# Patient Record
Sex: MALE | Race: BLACK OR AFRICAN AMERICAN | NOT HISPANIC OR LATINO | Employment: FULL TIME | URBAN - METROPOLITAN AREA
[De-identification: names, ages, dates, MRNs, and addresses within clinical notes are randomized per-mention and may not be internally consistent; named-entity substitution may affect disease eponyms.]

---

## 2020-12-09 ENCOUNTER — HOSPITAL ENCOUNTER (OUTPATIENT)
Dept: MEDSURG UNIT | Facility: HOSPITAL | Age: 30
End: 2020-12-11
Attending: INTERNAL MEDICINE | Admitting: INTERNAL MEDICINE

## 2020-12-09 LAB
ABS NEUT (OLG): 12.24 X10(3)/MCL (ref 2.1–9.2)
ALBUMIN SERPL-MCNC: 2.7 GM/DL (ref 3.5–5)
ALBUMIN/GLOB SERPL: 0.6 RATIO (ref 1.1–2)
ALP SERPL-CCNC: 144 UNIT/L (ref 40–150)
ALT SERPL-CCNC: 53 UNIT/L (ref 0–55)
AMYLASE SERPL-CCNC: 116 UNIT/L (ref 25–125)
APPEARANCE, UA: CLEAR
AST SERPL-CCNC: 51 UNIT/L (ref 5–34)
BACTERIA #/AREA URNS AUTO: ABNORMAL /HPF
BASOPHILS # BLD AUTO: 0 X10(3)/MCL (ref 0–0.2)
BASOPHILS NFR BLD AUTO: 0 %
BILIRUB SERPL-MCNC: 0.7 MG/DL
BILIRUB UR QL STRIP: NEGATIVE
BILIRUBIN DIRECT+TOT PNL SERPL-MCNC: 0.3 MG/DL (ref 0–0.8)
BILIRUBIN DIRECT+TOT PNL SERPL-MCNC: 0.4 MG/DL (ref 0–0.5)
BUN SERPL-MCNC: 5 MG/DL (ref 8.9–20.6)
C DIFF INTERP: NORMAL
CALCIUM SERPL-MCNC: 8.9 MG/DL (ref 8.4–10.2)
CHLORIDE SERPL-SCNC: 104 MMOL/L (ref 98–107)
CHOLEST SERPL-MCNC: 75 MG/DL
CHOLEST/HDLC SERPL: 5 {RATIO} (ref 0–5)
CO2 SERPL-SCNC: 25 MMOL/L (ref 22–29)
COLOR STL: NORMAL
COLOR UR: ABNORMAL
CONSISTENCY STL: NORMAL
CREAT SERPL-MCNC: 0.66 MG/DL (ref 0.73–1.18)
CRP SERPL-MCNC: 15.11 MG/DL
DEPRECATED CALCIDIOL+CALCIFEROL SERPL-MC: 16.9 NG/ML (ref 30–80)
EOSINOPHIL # BLD AUTO: 0.3 X10(3)/MCL (ref 0–0.9)
EOSINOPHIL NFR BLD AUTO: 2 %
ERYTHROCYTE [DISTWIDTH] IN BLOOD BY AUTOMATED COUNT: 15.2 % (ref 11.5–14.5)
ERYTHROCYTE [SEDIMENTATION RATE] IN BLOOD: 39 MM/HR (ref 0–15)
FERRITIN SERPL-MCNC: 759.73 NG/ML (ref 21.81–274.66)
FOLATE SERPL-MCNC: 4.2 NG/ML (ref 7–31.4)
GLOBULIN SER-MCNC: 4.2 GM/DL (ref 2.4–3.5)
GLUCOSE (UA): NEGATIVE
GLUCOSE SERPL-MCNC: 113 MG/DL (ref 74–100)
HCT VFR BLD AUTO: 35.4 % (ref 40–51)
HDLC SERPL-MCNC: 16 MG/DL (ref 35–60)
HEMOCCULT SP1 STL QL: NEGATIVE
HGB BLD-MCNC: 11.7 GM/DL (ref 13.5–17.5)
HGB UR QL STRIP: NEGATIVE
HIV 1+2 AB+HIV1 P24 AG SERPL QL IA: NONREACTIVE
HYALINE CASTS #/AREA URNS LPF: ABNORMAL /LPF
IMM GRANULOCYTES # BLD AUTO: 0.39 10*3/UL
IMM GRANULOCYTES NFR BLD AUTO: 2 %
KETONES UR QL STRIP: NEGATIVE
LACTATE SERPL-SCNC: 1.1 MMOL/L (ref 0.5–2.2)
LDLC SERPL CALC-MCNC: 44 MG/DL (ref 50–140)
LEUKOCYTE ESTERASE UR QL STRIP: NEGATIVE
LIPASE SERPL-CCNC: 350 U/L
LYMPHOCYTES # BLD AUTO: 1.9 X10(3)/MCL (ref 0.6–4.6)
LYMPHOCYTES NFR BLD AUTO: 12 %
MAGNESIUM SERPL-MCNC: 1.75 MG/DL (ref 1.6–2.6)
MCH RBC QN AUTO: 31.4 PG (ref 26–34)
MCHC RBC AUTO-ENTMCNC: 33.1 GM/DL (ref 31–37)
MCV RBC AUTO: 94.9 FL (ref 80–100)
MONOCYTES # BLD AUTO: 0.9 X10(3)/MCL (ref 0.1–1.3)
MONOCYTES NFR BLD AUTO: 6 %
NEUTROPHILS # BLD AUTO: 12.24 X10(3)/MCL (ref 2.1–9.2)
NEUTROPHILS NFR BLD AUTO: 78 %
NITRITE UR QL STRIP: NEGATIVE
PH UR STRIP: 6 [PH] (ref 4.5–8)
PHOSPHATE SERPL-MCNC: 3.7 MG/DL (ref 2.3–4.7)
PLATELET # BLD AUTO: 319 X10(3)/MCL (ref 130–400)
PMV BLD AUTO: 10.1 FL (ref 7.4–10.4)
POTASSIUM SERPL-SCNC: 3.9 MMOL/L (ref 3.5–5.1)
PROT SERPL-MCNC: 6.9 GM/DL (ref 6.4–8.3)
PROT UR QL STRIP: NEGATIVE
RBC # BLD AUTO: 3.73 X10(6)/MCL (ref 4.5–5.9)
RBC #/AREA URNS AUTO: ABNORMAL /HPF
SARS-COV-2 AG RESP QL IA.RAPID: NEGATIVE
SODIUM SERPL-SCNC: 138 MMOL/L (ref 136–145)
SP GR UR STRIP: 1 (ref 1–1.03)
SQUAMOUS #/AREA URNS LPF: ABNORMAL /LPF
TRIGL SERPL-MCNC: 76 MG/DL (ref 34–140)
TSH SERPL-ACNC: 0.79 UIU/ML (ref 0.35–4.94)
UROBILINOGEN UR STRIP-ACNC: NORMAL
VALPROATE SERPL-MCNC: 39.4 UG/ML (ref 50–100)
VIT B12 SERPL-MCNC: 1185 PG/ML (ref 213–816)
VLDLC SERPL CALC-MCNC: 15 MG/DL
WBC # SPEC AUTO: 15.8 X10(3)/MCL (ref 4.5–11)
WBC #/AREA URNS AUTO: ABNORMAL /HPF

## 2020-12-10 LAB
ABS NEUT (OLG): 10.23 X10(3)/MCL (ref 2.1–9.2)
ALBUMIN SERPL-MCNC: 2.3 GM/DL (ref 3.5–5)
ALBUMIN/GLOB SERPL: 0.6 RATIO (ref 1.1–2)
ALP SERPL-CCNC: 117 UNIT/L (ref 40–150)
ALT SERPL-CCNC: 37 UNIT/L (ref 0–55)
AMPHET UR QL SCN: NEGATIVE
AST SERPL-CCNC: 36 UNIT/L (ref 5–34)
BARBITURATE SCN PRESENT UR: NEGATIVE
BASOPHILS # BLD AUTO: 0 X10(3)/MCL (ref 0–0.2)
BASOPHILS NFR BLD AUTO: 0 %
BENZODIAZ UR QL SCN: NEGATIVE
BILIRUB SERPL-MCNC: 0.8 MG/DL
BILIRUBIN DIRECT+TOT PNL SERPL-MCNC: 0.4 MG/DL (ref 0–0.5)
BILIRUBIN DIRECT+TOT PNL SERPL-MCNC: 0.4 MG/DL (ref 0–0.8)
BUN SERPL-MCNC: 5 MG/DL (ref 8.9–20.6)
CALCIUM SERPL-MCNC: 8.7 MG/DL (ref 8.4–10.2)
CANNABINOIDS UR QL SCN: NEGATIVE
CHLORIDE SERPL-SCNC: 103 MMOL/L (ref 98–107)
CO2 SERPL-SCNC: 27 MMOL/L (ref 22–29)
COCAINE UR QL SCN: NEGATIVE
CREAT SERPL-MCNC: 0.68 MG/DL (ref 0.73–1.18)
EOSINOPHIL # BLD AUTO: 0.4 X10(3)/MCL (ref 0–0.9)
EOSINOPHIL NFR BLD AUTO: 3 %
ERYTHROCYTE [DISTWIDTH] IN BLOOD BY AUTOMATED COUNT: 15.3 % (ref 11.5–14.5)
GLOBULIN SER-MCNC: 3.7 GM/DL (ref 2.4–3.5)
GLUCOSE SERPL-MCNC: 73 MG/DL (ref 74–100)
HCT VFR BLD AUTO: 30.7 % (ref 40–51)
HGB BLD-MCNC: 10 GM/DL (ref 13.5–17.5)
IMM GRANULOCYTES # BLD AUTO: 0.21 10*3/UL
IMM GRANULOCYTES NFR BLD AUTO: 2 %
IRON SATN MFR SERPL: 9 % (ref 20–50)
IRON SERPL-MCNC: 15 UG/DL (ref 65–175)
LYMPHOCYTES # BLD AUTO: 1.8 X10(3)/MCL (ref 0.6–4.6)
LYMPHOCYTES NFR BLD AUTO: 14 %
MAGNESIUM SERPL-MCNC: 1.79 MG/DL (ref 1.6–2.6)
MCH RBC QN AUTO: 31.5 PG (ref 26–34)
MCHC RBC AUTO-ENTMCNC: 32.6 GM/DL (ref 31–37)
MCV RBC AUTO: 96.8 FL (ref 80–100)
MONOCYTES # BLD AUTO: 1 X10(3)/MCL (ref 0.1–1.3)
MONOCYTES NFR BLD AUTO: 7 %
NEUTROPHILS # BLD AUTO: 10.23 X10(3)/MCL (ref 2.1–9.2)
NEUTROPHILS NFR BLD AUTO: 75 %
OPIATES UR QL SCN: POSITIVE
PCP UR QL: NEGATIVE
PH UR STRIP.AUTO: 6.5 [PH] (ref 3–11)
PHOSPHATE SERPL-MCNC: 4.1 MG/DL (ref 2.3–4.7)
PLATELET # BLD AUTO: 247 X10(3)/MCL (ref 130–400)
PMV BLD AUTO: 10.3 FL (ref 7.4–10.4)
POTASSIUM SERPL-SCNC: 4.1 MMOL/L (ref 3.5–5.1)
PROT SERPL-MCNC: 6 GM/DL (ref 6.4–8.3)
RBC # BLD AUTO: 3.17 X10(6)/MCL (ref 4.5–5.9)
SODIUM SERPL-SCNC: 135 MMOL/L (ref 136–145)
TIBC SERPL-MCNC: 155 UG/DL (ref 69–240)
TIBC SERPL-MCNC: 170 UG/DL (ref 250–450)
TRANSFERRIN SERPL-MCNC: 152 MG/DL (ref 174–364)
WBC # SPEC AUTO: 13.7 X10(3)/MCL (ref 4.5–11)

## 2020-12-11 LAB
ABS NEUT (OLG): 6.11 X10(3)/MCL (ref 2.1–9.2)
BASOPHILS # BLD AUTO: 0 X10(3)/MCL (ref 0–0.2)
BASOPHILS NFR BLD AUTO: 0 %
COLOR STL: NORMAL
CONSISTENCY STL: NORMAL
EOSINOPHIL # BLD AUTO: 0.6 X10(3)/MCL (ref 0–0.9)
EOSINOPHIL NFR BLD AUTO: 6 %
ERYTHROCYTE [DISTWIDTH] IN BLOOD BY AUTOMATED COUNT: 14.8 % (ref 11.5–14.5)
HCT VFR BLD AUTO: 29.7 % (ref 40–51)
HEMOCCULT SP1 STL QL: NEGATIVE
HGB BLD-MCNC: 9.7 GM/DL (ref 13.5–17.5)
IMM GRANULOCYTES # BLD AUTO: 0.16 10*3/UL
IMM GRANULOCYTES NFR BLD AUTO: 2 %
LIPASE SERPL-CCNC: 215 U/L
LYMPHOCYTES # BLD AUTO: 1.4 X10(3)/MCL (ref 0.6–4.6)
LYMPHOCYTES NFR BLD AUTO: 16 %
MAGNESIUM SERPL-MCNC: 1.74 MG/DL (ref 1.6–2.6)
MCH RBC QN AUTO: 31.9 PG (ref 26–34)
MCHC RBC AUTO-ENTMCNC: 32.7 GM/DL (ref 31–37)
MCV RBC AUTO: 97.7 FL (ref 80–100)
MONOCYTES # BLD AUTO: 0.7 X10(3)/MCL (ref 0.1–1.3)
MONOCYTES NFR BLD AUTO: 8 %
NEUTROPHILS # BLD AUTO: 6.11 X10(3)/MCL (ref 2.1–9.2)
NEUTROPHILS NFR BLD AUTO: 68 %
PHOSPHATE SERPL-MCNC: 3.8 MG/DL (ref 2.3–4.7)
PLATELET # BLD AUTO: 239 X10(3)/MCL (ref 130–400)
PMV BLD AUTO: 10.7 FL (ref 7.4–10.4)
RBC # BLD AUTO: 3.04 X10(6)/MCL (ref 4.5–5.9)
WBC # SPEC AUTO: 8.9 X10(3)/MCL (ref 4.5–11)

## 2020-12-14 LAB
FINAL CULTURE: NORMAL
FINAL CULTURE: NORMAL

## 2021-03-09 ENCOUNTER — HISTORICAL (OUTPATIENT)
Dept: ADMINISTRATIVE | Facility: HOSPITAL | Age: 31
End: 2021-03-09

## 2021-03-09 LAB
ABS NEUT (OLG): 2.02 X10(3)/MCL (ref 2.1–9.2)
ALBUMIN SERPL-MCNC: 4 GM/DL (ref 3.5–5)
ALBUMIN/GLOB SERPL: 1.1 RATIO (ref 1.1–2)
ALP SERPL-CCNC: 84 UNIT/L (ref 40–150)
ALT SERPL-CCNC: 23 UNIT/L (ref 0–55)
AST SERPL-CCNC: 24 UNIT/L (ref 5–34)
BASOPHILS # BLD AUTO: 0 X10(3)/MCL (ref 0–0.2)
BASOPHILS NFR BLD AUTO: 0 %
BILIRUB SERPL-MCNC: 0.7 MG/DL
BILIRUBIN DIRECT+TOT PNL SERPL-MCNC: 0.3 MG/DL (ref 0–0.5)
BILIRUBIN DIRECT+TOT PNL SERPL-MCNC: 0.4 MG/DL (ref 0–0.8)
BUN SERPL-MCNC: 6.8 MG/DL (ref 8.9–20.6)
CALCIUM SERPL-MCNC: 9.3 MG/DL (ref 8.4–10.2)
CHLORIDE SERPL-SCNC: 105 MMOL/L (ref 98–107)
CO2 SERPL-SCNC: 29 MMOL/L (ref 22–29)
CREAT SERPL-MCNC: 0.79 MG/DL (ref 0.73–1.18)
EOSINOPHIL # BLD AUTO: 0.1 X10(3)/MCL (ref 0–0.9)
EOSINOPHIL NFR BLD AUTO: 2 %
ERYTHROCYTE [DISTWIDTH] IN BLOOD BY AUTOMATED COUNT: 13.7 % (ref 11.5–14.5)
GLOBULIN SER-MCNC: 3.6 GM/DL (ref 2.4–3.5)
GLUCOSE SERPL-MCNC: 80 MG/DL (ref 74–100)
HCT VFR BLD AUTO: 41 % (ref 40–51)
HGB BLD-MCNC: 13.3 GM/DL (ref 13.5–17.5)
IMM GRANULOCYTES # BLD AUTO: 0.01 10*3/UL
IMM GRANULOCYTES NFR BLD AUTO: 0 %
LYMPHOCYTES # BLD AUTO: 2.1 X10(3)/MCL (ref 0.6–4.6)
LYMPHOCYTES NFR BLD AUTO: 46 %
MCH RBC QN AUTO: 29.6 PG (ref 26–34)
MCHC RBC AUTO-ENTMCNC: 32.4 GM/DL (ref 31–37)
MCV RBC AUTO: 91.1 FL (ref 80–100)
MONOCYTES # BLD AUTO: 0.3 X10(3)/MCL (ref 0.1–1.3)
MONOCYTES NFR BLD AUTO: 6 %
NEUTROPHILS # BLD AUTO: 2.02 X10(3)/MCL (ref 2.1–9.2)
NEUTROPHILS NFR BLD AUTO: 45 %
PLATELET # BLD AUTO: 211 X10(3)/MCL (ref 130–400)
PMV BLD AUTO: 11.2 FL (ref 7.4–10.4)
POTASSIUM SERPL-SCNC: 4 MMOL/L (ref 3.5–5.1)
PROT SERPL-MCNC: 7.6 GM/DL (ref 6.4–8.3)
RBC # BLD AUTO: 4.5 X10(6)/MCL (ref 4.5–5.9)
SODIUM SERPL-SCNC: 140 MMOL/L (ref 136–145)
TRIGL SERPL-MCNC: 115 MG/DL (ref 34–140)
WBC # SPEC AUTO: 4.5 X10(3)/MCL (ref 4.5–11)

## 2021-08-07 ENCOUNTER — HOSPITAL ENCOUNTER (EMERGENCY)
Facility: HOSPITAL | Age: 31
Discharge: HOME | End: 2021-08-07
Attending: EMERGENCY MEDICINE
Payer: MEDICARE

## 2021-08-07 VITALS
TEMPERATURE: 98 F | BODY MASS INDEX: 29.03 KG/M2 | RESPIRATION RATE: 18 BRPM | SYSTOLIC BLOOD PRESSURE: 120 MMHG | OXYGEN SATURATION: 97 % | DIASTOLIC BLOOD PRESSURE: 80 MMHG | HEIGHT: 67 IN | WEIGHT: 185 LBS | HEART RATE: 105 BPM

## 2021-08-07 DIAGNOSIS — S39.012A BACK STRAIN, INITIAL ENCOUNTER: Primary | ICD-10-CM

## 2021-08-07 PROCEDURE — 25000003 PHARM REV CODE 250: Performed by: PHYSICIAN ASSISTANT

## 2021-08-07 PROCEDURE — 99283 EMERGENCY DEPT VISIT LOW MDM: CPT

## 2021-08-07 RX ORDER — METHOCARBAMOL 500 MG/1
500 TABLET, FILM COATED ORAL
Status: COMPLETED | OUTPATIENT
Start: 2021-08-07 | End: 2021-08-07

## 2021-08-07 RX ORDER — ACETAMINOPHEN 325 MG/1
650 TABLET ORAL
Status: COMPLETED | OUTPATIENT
Start: 2021-08-07 | End: 2021-08-07

## 2021-08-07 RX ADMIN — ACETAMINOPHEN 650 MG: 325 TABLET ORAL at 03:08

## 2021-08-07 RX ADMIN — METHOCARBAMOL 500 MG: 500 TABLET ORAL at 03:08

## 2021-08-12 LAB
ABS NEUT (OLG): 2.35 X10(3)/MCL (ref 2.1–9.2)
ALBUMIN SERPL-MCNC: 4.2 GM/DL (ref 3.5–5)
ALBUMIN/GLOB SERPL: 1.4 RATIO (ref 1.1–2)
ALP SERPL-CCNC: 88 UNIT/L (ref 40–150)
ALT SERPL-CCNC: 41 UNIT/L (ref 0–55)
AST SERPL-CCNC: 36 UNIT/L (ref 5–34)
BASOPHILS # BLD AUTO: 0 X10(3)/MCL (ref 0–0.2)
BASOPHILS NFR BLD AUTO: 0 %
BILIRUB SERPL-MCNC: 0.5 MG/DL
BILIRUBIN DIRECT+TOT PNL SERPL-MCNC: 0.2 MG/DL (ref 0–0.5)
BILIRUBIN DIRECT+TOT PNL SERPL-MCNC: 0.3 MG/DL (ref 0–0.8)
BUN SERPL-MCNC: 5.1 MG/DL (ref 8.9–20.6)
CALCIUM SERPL-MCNC: 9.7 MG/DL (ref 8.4–10.2)
CHLORIDE SERPL-SCNC: 103 MMOL/L (ref 98–107)
CO2 SERPL-SCNC: 27 MMOL/L (ref 22–29)
CREAT SERPL-MCNC: 0.78 MG/DL (ref 0.73–1.18)
EOSINOPHIL # BLD AUTO: 0.1 X10(3)/MCL (ref 0–0.9)
EOSINOPHIL NFR BLD AUTO: 3 %
ERYTHROCYTE [DISTWIDTH] IN BLOOD BY AUTOMATED COUNT: 18 % (ref 11.5–17)
GLOBULIN SER-MCNC: 3.1 GM/DL (ref 2.4–3.5)
GLUCOSE SERPL-MCNC: 88 MG/DL (ref 74–100)
HCT VFR BLD AUTO: 36.2 % (ref 42–52)
HGB BLD-MCNC: 12.5 GM/DL (ref 14–18)
INR PPP: 1 (ref 0–1.3)
LYMPHOCYTES # BLD AUTO: 1.6 X10(3)/MCL (ref 0.6–4.6)
LYMPHOCYTES NFR BLD AUTO: 38 %
MCH RBC QN AUTO: 34.5 PG (ref 27–31)
MCHC RBC AUTO-ENTMCNC: 34.5 GM/DL (ref 33–36)
MCV RBC AUTO: 100 FL (ref 80–94)
MONOCYTES # BLD AUTO: 0.2 X10(3)/MCL (ref 0.1–1.3)
MONOCYTES NFR BLD AUTO: 6 %
NEUTROPHILS # BLD AUTO: 2.35 X10(3)/MCL (ref 2.1–9.2)
NEUTROPHILS NFR BLD AUTO: 54 %
PLATELET # BLD AUTO: 253 X10(3)/MCL (ref 130–400)
PMV BLD AUTO: 10.8 FL (ref 9.4–12.4)
POTASSIUM SERPL-SCNC: 3.7 MMOL/L (ref 3.5–5.1)
PROT SERPL-MCNC: 7.3 GM/DL (ref 6.4–8.3)
PROTHROMBIN TIME: 12.8 SECOND(S) (ref 12.5–14.5)
RBC # BLD AUTO: 3.62 X10(6)/MCL (ref 4.7–6.1)
SARS-COV-2 AG RESP QL IA.RAPID: NEGATIVE
SODIUM SERPL-SCNC: 140 MMOL/L (ref 136–145)
WBC # SPEC AUTO: 4.4 X10(3)/MCL (ref 4.5–11.5)

## 2021-08-13 ENCOUNTER — HISTORICAL (OUTPATIENT)
Dept: ADMINISTRATIVE | Facility: HOSPITAL | Age: 31
End: 2021-08-13

## 2021-08-13 LAB — EST CREAT CLEARANCE SER (OHS): 135.51 ML/MIN

## 2022-02-01 ENCOUNTER — HISTORICAL (OUTPATIENT)
Dept: ADMINISTRATIVE | Facility: HOSPITAL | Age: 32
End: 2022-02-01

## 2022-02-08 ENCOUNTER — HISTORICAL (OUTPATIENT)
Dept: ADMINISTRATIVE | Facility: HOSPITAL | Age: 32
End: 2022-02-08

## 2022-04-10 ENCOUNTER — HISTORICAL (OUTPATIENT)
Dept: ADMINISTRATIVE | Facility: HOSPITAL | Age: 32
End: 2022-04-10
Payer: COMMERCIAL

## 2022-04-26 VITALS
BODY MASS INDEX: 29.46 KG/M2 | HEIGHT: 69 IN | DIASTOLIC BLOOD PRESSURE: 84 MMHG | WEIGHT: 198.88 LBS | SYSTOLIC BLOOD PRESSURE: 119 MMHG
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
## 2022-05-02 NOTE — HISTORICAL [facility] CERNER
This is a historical note converted from Angela. Formatting and pictures may have been removed.  Please reference Angela for original formatting and attached multimedia. Chief Complaint  Pt to ed c/o generalized abdominal pain x 10 days. Also reports diarrhea.  History of Present Illness  Patient is hard to hear?so used??for history taking. ?A 30-year-old?-American?male?who presented to ED with chief complaint of?worsening crampy 10 on 10?episodic?abdominal pain?6-7 times per day?for past 10 days?denies any radiation of pain?but does endorse that it improves with?resting?and worse with movement.? Patient reports having?watery dark stools?6-7 episodes per day.? He also having?dry cough,?denies any?fevers,?aches, night sweats, exposure to sick contacts. ?Denies any chest pain, S OB,?vomiting,?dysuria.? Denies any changes in?weight and appetite.? Patient reports having good appetite but however?unable to eat much?due to his abdominal pain.? Patient also reports having?blood streaks in the stool?but denies any melena, massive blood?in stool,?denies any hemoptysis, hematemesis.  Of note?patient was?recently admitted?at [facility]?for?similar complaints,?which was presumed to be pancreatitis secondary to valproic acid.GI followed him throughout the stay?and?was?worked up?for belly pain with further??EGD?with?biopsy?remarkable for? acute duodenitis,?upper GI x-ray?series?within normal limits,?abdominal ultrasound?was within normal limits with no gallstones?or CBD dilatation, however CT abdomen?with?minimal?fluid surrounding?pancreas.??On discharge from the hospital?patient was?discontinued from valproic acid?and started on?Keppra 500?mg twice daily.? But however?patient is not sure?what seizure medications he is on at home.  Past medical history:?Hearing difficulty?since childhood, has cochlear implant,?depression, SI affective disorder,?epilepsy.  Past surgical history:?Possible  appendicectomy?per grandfather.  Social history:?Denies any active smoking, alcohol abuse,?illicit drug usage  Review of Systems  Constitutional:?no fever, fatigue, weakness  Respiratory:?no cough, no wheezing, no shortness of breath  Cardiovascular:?no chest pain, no palpitations, no edema  Gastrointestinal:?no nausea, vomiting, + diarrhea.?+ abdominal pain  Genitourinary:?no dysuria, no urinary frequency or urgency, no hematuria  Hema/Lymph:?no abnormal bruising or bleeding  Endocrine:?no heat or cold intolerance, no excessive thirst or excessive urination  Musculoskeletal:?no muscle or joint pain, no joint swelling  Integumentary:?no skin rash or abnormal lesion  Neurologic: no headache, no dizziness, no weakness or numbness  ?  Physical Exam  Vitals & Measurements  T:?36.9? ?C (Temporal Artery)? HR:?93(Peripheral)? RR:?16? BP:?116/70? SpO2:?98%? SpO2:?98%? WT:?87.35?kg?  Gen: well-nourished, well-developed? male in no acute distress  HEENT: Normocephalic, atraumatic.  Neck: No JVD or carotid bruits. No thyromegaly. No lymphadenopathy.  Heart: RRR, no murmurs, gallops, clicks or rubs.  Lungs: CTAB without rales, wheezes or rhonchi. Normal work of breathing. Chest rise symmetrical on inspiration.  Abd: Soft, diffuse abdominal tenderness noted, mildly distended and without guarding?and rigidity. No organomegaly. No obvious masses. Bowel sounds present.  Extremities: Radial and pedal pulses 2+ bilaterally, no LE edema.  MSK: No obvious deformities. Moves all extremities purposefully.  Neuro: Responds well to commands.  Skin: Warm, dry and without rashes.  ?  Assessment/Plan  Acute??pancreatitis with peripancreatic fluid collection.  -Lipase on arrival is?350,?CT abdomen concerning for?pancreatitis with pancreatic inflammation,?5 cm?confluent area of fluid?along greatest curvature of stomach?suggestive of developing?peripancreatic fluid collection.  -Pancreatitis?likely secondary to?valproic  acid.  -To obtain valproic acid levels.  -On IV hydration?at 250 cc/h, to advance diet?as tolerated.  -On morphine 4 mg?every 4?as needed for?abdominal pain,?Zofran 8 mg?every 4 as needed for nausea.  ?  Chronic abdominal pain associated with diarrhea.  -Patient reports?having?crampy abdominal pain with watery diarrhea,?to get?fecal lactoferrin, calprotectin?to rule out IBD, also to get?C. difficile?stool assay,?HIV .  ?  Possible atypical pneumonia?versus aspiration pneumonia.  -During the previous admission?3 to 4 days?pror?patient was in respiratory distress and there was a concern for?aspiration pneumonia?improved with?antibiotics.  -?Patient continues to report having cough?however nonproductive,?has white count of?15.8?and was?tachycardic?on current admission.  -Chest x-ray today concerning for?bilateral?basilar interstitial opacities.  -To continue Zosyn empirically for now.  -Blood cultures were ordered in ED, to follow-up.  ?  History of epilepsy.  -Patient reports that his last seizure was 4 days ago.  -Plan to get valproic acid levels.??  -To continue Keppra 500 mg twice daily?for seizure prophylaxis.  ?  Normocytic normochromic anemia.  -H&H?is 11.7/35.4, no active signs of bleeding.  -To get?ferritin,?B12 levels,?folate levels,?FOBT?to follow-up.  DVT PPx:?Lovenox 40 daily  GI PPx:?Protonix 40 daily  Antibiotics:?Zosyn day 1  IVF:?NS at 200 cc/h  O2:?RA  Code status:?Full code  PCP:???  Dispo:?Patient admitted for acute pancreatitis?with?peripancreatic fluid collection, n.p.o. for now?advance diet as tolerated?patient continues to worsen?consider repeating?CT abdomen?as needed.  ?  ?  ?  ?  ?   Lab Results  Labs Last 24 Hours?  ?Chemistry? Hematology/Coagulation?   Sodium Lvl: 138 mmol/L (12/09/20 10:51:00) WBC:?15.8 x10(3)/mcL?High (12/09/20 10:51:00)   Potassium Lvl: 3.9 mmol/L (12/09/20 10:51:00) RBC:?3.73 x10(6)/mcL?Low (12/09/20 10:51:00)   Chloride: 104 mmol/L (12/09/20 10:51:00) Hgb:?11.7  gm/dL?Low (12/09/20 10:51:00)   CO2: 25 mmol/L (12/09/20 10:51:00) Hct:?35.4 %?Low (12/09/20 10:51:00)   Calcium Lvl: 8.9 mg/dL (12/09/20 10:51:00) Platelet: 319 x10(3)/mcL (12/09/20 10:51:00)   Magnesium Lvl: 1.75 mg/dL (12/09/20 10:51:00) MCV: 94.9 fL (12/09/20 10:51:00)   Glucose Lvl:?113 mg/dL?High (12/09/20 10:51:00) MCH: 31.4 pg (12/09/20 10:51:00)   BUN:?5 mg/dL?Low (12/09/20 10:51:00) MCHC: 33.1 gm/dL (12/09/20 10:51:00)   Creatinine:?0.66 mg/dL?Low (12/09/20 10:51:00) RDW:?15.2 %?High (12/09/20 10:51:00)   Est Creat Clearance Ser: 161.63 mL/min (12/09/20 12:10:00) MPV: 10.1 fL (12/09/20 10:51:00)   eGFR-AA: >105 (12/09/20 10:51:00) Abs Neut:?12.24 x10(3)/mcL?High (12/09/20 10:51:00)   eGFR-LEANDRA: >105 (12/09/20 10:51:00) Neutro Auto: 78 % (12/09/20 10:51:00)   Amylase Lvl: 116 unit/L (12/09/20 10:51:00) Lymph Auto: 12 % (12/09/20 10:51:00)   Bili Total: 0.7 mg/dL (12/09/20 10:51:00) Mono Auto: 6 % (12/09/20 10:51:00)   Bili Direct: 0.4 mg/dL (12/09/20 10:51:00) Eos Auto: 2 % (12/09/20 10:51:00)   Bili Indirect: 0.3 mg/dL (12/09/20 10:51:00) Abs Eos: 0.3 x10(3)/mcL (12/09/20 10:51:00)   AST:?51 unit/L?High (12/09/20 10:51:00) Basophil Auto: 0 % (12/09/20 10:51:00)   ALT: 53 unit/L (12/09/20 10:51:00) Abs Neutro:?12.24 x10(3)/mcL?High (12/09/20 10:51:00)   Alk Phos: 144 unit/L (12/09/20 10:51:00) Abs Lymph: 1.9 x10(3)/mcL (12/09/20 10:51:00)   Total Protein: 6.9 gm/dL (12/09/20 10:51:00) Abs Mono: 0.9 x10(3)/mcL (12/09/20 10:51:00)   Albumin Lvl:?2.7 gm/dL?Low (12/09/20 10:51:00) Abs Baso: 0 x10(3)/mcL (12/09/20 10:51:00)   Globulin:?4.2 gm/dL?High (12/09/20 10:51:00) IG%: 2 % (12/09/20 10:51:00)   A/G Ratio:?0.6 ratio?Low (12/09/20 10:51:00) IG#: 0.39 (12/09/20 10:51:00)   Phosphorus: 3.7 mg/dL (12/09/20 10:51:00)    Ferritin Lvl:?759.73 ng/mL?High (12/09/20 10:51:00)    Lactic Acid Lvl: 1.1 mmol/L (12/09/20 14:09:00)    Lipase Lvl:?350 U/L?High (12/09/20 10:51:00)    Chol: 75 mg/dL (12/09/20 10:51:00)     HDL:?16 mg/dL?Low (20 10:51:00)    Tri mg/dL (20 10:51:00)    LDL:?44 mg/dL?Low (20 10:51:00)    Chol/HDL: 5 (20 10:51:00)    VLDL: 15 (20 10:51:00)    TSH: 0.787 uIU/mL (20 10:51:00)    Diagnostic Results  Accession:?[id number]  Order:?XR Chest 2 Views  Report Dt/Tm:?2020 16:08  Report:?  XR Chest 2 Views  ?  INDICATION: Cough  ?  COMPARISON: 2020  ?  FINDINGS: As below.  ?  IMPRESSION:  ?  LINES/TUBES: None.  ?  Bibasilar airspace opacities persist, likely infectious/inflammatory,  not significantly changed.  ?  Small bilateral pleural effusions are also grossly unchanged.  ?  Heart size stable.  No pneumothoraces.  ?  Accession:?[id number]  Order:?CT Abdomen and Pelvis W Contrast  Report Dt/Tm:?2020 13:39  Report:?  CT Abdomen and Pelvis W Contrast  ?  REASON FOR STUDY: Abdominal Pain?  ?  TECHNIQUE: CT imaging was performed of the abdomen and pelvis after  the administration of intravenous contrast. Dose length product is  1052 mGycm. Automatic exposure control, adjustment of mA/kV or  iterative reconstruction technique was used to limit radiation dose.  ?  COMPARISON: CT 2020?  ?  FINDINGS:?  ?  Liver/biliary: Normal liver. ?No calcified gallstones. No intra or  extrahepatic biliary ductal dilation. ?  ?  Pancreas: Increased peripancreatic stranding since prior CT with small  volume adjacent fluid. No defined areas of pancreatic necrosis.  ?  Spleen: At upper limits of normal in size.  ?  Adrenals: Normal.  ?  Genitourinary: Symmetric renal enhancement. No hydronephrosis. The  bladder is within normal limits. Normal prostate size.  ?  Stomach/bowel: No evidence of bowel obstruction. Appendix not seen  with suspected prior appendectomy.  ?  Lymph nodes: No pathologically enlarged lymph node identified.  ?  Peritoneum: Confluent area of fluid abutting the greater curvature of  the stomach measuring up to 50 mm with some thin areas of  peripheral  enhancement.  ?  Vasculature: Normal abdominal aortic caliber. The SMV, splenic vein  and main portal vein are patent.?  ?  Abdominal wall: Tiny fat-containing umbilical hernia. Similar nodular  areas of subcutaneous soft tissue over the right buttock, possibly  injection sites.  ?  Lung bases: Small bilateral pleural effusions with areas of mild  adjacent atelectasis.  ?  Musculoskeletal: No acute osseous findings.  ?  IMPRESSION: Pancreatitis with increased pancreatic inflammation since  11/29/2020. 50 mm confluent area of fluid along the greater curvature  of the stomach is suggestive of developing acute peripancreatic fluid  collection.  ?      Pt seen and Examined with HO1,  Agree with A/P  ?  30-year-old hard of hearing male recently discharged from [facility] for acute pancreatitis attributed to Depakote returns after 3 days with continuing ongoing complaints of abdominal pain nausea cannot tolerate by mouth as well as diarrhea.  Vital signs fairly benign tachycardia resolving after IV fluids given?in the ER  Physical exam showing diffuse tenderness with presence of bowel sounds no organomegaly, guarding  Lab abnormalities with slight hypokalemia hypomagnesemia hypoalbuminemia, elevated ferritin, low folate, low vitamin D, elevated white count of 15.8 and a low valproic acid level, negative COVID  CT scan done in the ER shows pancreatitis with increased inflammation compared to the previous CT scan done at [facility] ER admission with 5 cm of fluid along the greater curvature stomach suggestive of developing acute peripancreatic fluid collection  ?  Admitting patient for acute pancreatitis, considering CT findings as well as elevated lipase the cause of which remains unclear considering Depakote levels are low/normal, after speaking with the patient as well as grandfather unclear if they were taken to Depakote upon discharge, looks like discharge summary indicates that they were instructed to?stop taking  Depakote was instructed to the [facility] for seizures remaining of [place] unimpressive for etiology of drug-induced pancreatitis. considering ongoing symptoms of diarrhea also ordered C. diff fecal lactoferrin,?calprotectin, ESR, CRP, UDS, IGg4  Repeat chest x-ray still shows the bilateral small pulmonary effusions but no obvious consolidation  Continue Zoysn. BC pending, Stool Ova and parasite pending  ?   I was present with the resident during the history and physical examination.  ???  [x ] I discussed the case with the resident and agree with the findings and plan as documented in the residents note.  [ ] I discussed the case with the resident and agree with the findings and plan as documented in the residents note except:  History as above  Abd- soft non tender  Will advance diet as tolerated

## 2022-05-02 NOTE — HISTORICAL [facility]ER
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  The gallbladder and bile ducts, and upper abdominal solid organs, and  urologic tracts are without evidence of acute or new focal  abnormality. The  The urinary bladder and prostate are unremarkable for CT appearance.  ?  There are no findings to suggest high-grade gastric outlet or small  bowel obstruction. Interval development of mild disorganized fluid and  fat stranding is appreciated adjacent to the duodenum. There is no  associated focal mural irregularity, convincing intraluminal  abnormality, or regional organized collection.  The appendix is not clearly identified, there are no secondary  findings to suggest acute appendicitis. Moderate volume residual fecal  material is present through the colon and may reflect element of  constipation. Otherwise, the large bowel is unremarkable.  No free intra-abdominal air is identified.  ?  There is no pathologic lymph node enlargement or development of  necrotic adenopathy.  ?  The body wall subcutaneous tissues, regional musculature, and  visualized osseous structures demonstrate no acute or new focal  abnormality. Small uncomplicated fat-containing  umbilical hernia is  similar in the interval.  ?  IMPRESSION  1. ?Interval development of disorganized fluid adjacent to the  proximal duodenum is nonspecific, may reflect element of duodenitis,  early pancreatitis, or possible sequela of duodenal ulcer.  2. ?Otherwise, no findings to suggest acute abdominopelvic process or  other significant interval change. [2] (12/09/2020 13:37 CST CT Abdomen and Pelvis W Contrast)  ?  Reason For Exam  diffuse abd pain;Abdominal Pain  ?  Radiology Report  CT Abdomen and Pelvis W Contrast  ?  REASON FOR STUDY: Abdominal Pain?  ?  TECHNIQUE: CT imaging was performed of the abdomen and pelvis after  the administration of intravenous contrast. Dose length product is  1052 mGycm. Automatic exposure control, adjustment of mA/kV or  iterative reconstruction technique was used to limit radiation dose.  ?  COMPARISON: CT 11/29/2020?  ?  FINDINGS:?  ?  Liver/biliary: Normal liver. ?No calcified gallstones. No intra or  extrahepatic biliary ductal dilation. ?  ?  Pancreas: Increased peripancreatic stranding since prior CT with small  volume adjacent fluid. No defined areas of pancreatic necrosis.  ?  Spleen: At upper limits of normal in size.  ?  Adrenals: Normal.  ?  Genitourinary: Symmetric renal enhancement. No hydronephrosis. The  bladder is within normal limits. Normal prostate size.  ?  Stomach/bowel: No evidence of bowel obstruction. Appendix not seen  with suspected prior appendectomy.  ?  Lymph nodes: No pathologically enlarged lymph node identified.  ?  Peritoneum: Confluent area of fluid abutting the greater curvature of  the stomach measuring up to 50 mm with some thin areas of peripheral  enhancement.  ?  Vasculature: Normal abdominal aortic caliber. The SMV, splenic vein  and main portal vein are patent.?  ?  Abdominal wall: Tiny fat-containing umbilical hernia. Similar nodular  areas of subcutaneous soft tissue over the right buttock, possibly  injection sites.  ?  Lung  bases: Small bilateral pleural effusions with areas of mild  adjacent atelectasis.  ?  Musculoskeletal: No acute osseous findings.  ?  IMPRESSION: Pancreatitis with increased pancreatic inflammation since  11/29/2020. 50 mm confluent area of fluid along the greater curvature  of the stomach is suggestive of developing acute peripancreatic fluid  collection. [3] (12/12/2020 14:09 CST CT Abdomen and Pelvis W Contrast)  Reason For Exam  Bowel Obstruction  ?  Radiology Report  INDICATION: Bowel Obstruction  COMPARISON:  Abdominopelvic CT 12/9/2020  ?  TECHNIQUE:?  CT of the abdomen and pelvis WITH intravenous contrast. The abdomen  and pelvis were scanned utilizing a multidetector helical scanner from  the diaphragm to the lesser trochanter after the administration of  intravenous contrast. Coronal and sagittal reformations were obtained.  Automatic exposure control was utilized to limit radiation dose.  ?  Radiation Dose:  Total DLP: 994 mGy*cm  ?  DISCUSSION:  LOWER THORAX: Bibasilar atelectasis/scarring. Trace right and small  left pleural effusions, grossly unchanged. No pericardial effusion.  ?  HEPATOBILIARY: Liver size within normal limits. No focal hepatic  lesions. No biliary ductal dilatation. Gallbladder within normal  limits. No radiodense gallstones.  SPLEEN: Mildly enlarged, measures 13 cm in maximal oblique axial  dimension. No focal lesions.  ?  PANCREAS: No focal masses or ductal dilatation. Redemonstration of  peripancreatic inflammatory changes at the distal body and tail. No  hypoenhancement to suggest necrosis. Few peripancreatic fluid  collections include a 4.5 x 3.5 cm collection which abuts the greater  curvature of the stomach (series 2 image 21), gastrohepatic 4.1 x 2.6  cm (image 24) and a developing 4.2 x 2.9 cm collection which abuts the  pancreatic tail (image 24).  ?  ADRENALS: No adrenal nodules.  KIDNEYS/URETERS: No hydronephrosis, stones, or solid mass lesions.  ?  PELVIC  ORGANS/BLADDER: Unremarkable.  PERITONEUM / RETROPERITONEUM: No free intraperitoneal air.  Peripancreatic fluid and fluid collections as above with fluid that  tracks down the left paracolic gutter.  ?  LYMPH NODES: No lymphadenopathy.  VESSELS: Unremarkable.  ?  GI TRACT: Wall prominence at the splenic flexure of colon is presumed  reactive. Otherwise no distention or wall thickening. Appendix not  visualized. No pericecal inflammatory changes.  ?  BONES AND SOFT TISSUES: Small fat-containing femoral hernia. Right mid  abdominal subcutaneous emphysema is presumed iatrogenic. Gluteal  injection granulomata. Otherwise soft tissues within normal limits. No  bony destructive lesions. No acute bony pathology.  ?  IMPRESSION:?  ?  Pancreatitis with a few pseudocysts. No necrosis. [4] (12/25/2020 16:41 CST CT Abdomen and Pelvis W Contrast)  Reason For Exam  Abdominal Pain  ?  Radiology Report  CT Abdomen and Pelvis W Contrast  ?  REASON FOR STUDY: Abdominal Pain?  ?  TECHNIQUE: CT imaging was performed of the abdomen and pelvis after  the administration of intravenous contrast. Dose length product is 559  mGycm. Automatic exposure control, adjustment of mA/kV or iterative  reconstruction technique was used to limit radiation dose.  ?  COMPARISON: CT abdomen pelvis dated 12/12/2020?  ?  FINDINGS:  ?  Evaluation is limited by motion artifact.  ?  Liver: Normal.?  ?  Gallbladder and biliary tree: No calcified gallstones. No intra or  extrahepatic biliary ductal dilation.?  ?  Pancreas: There is residual fat stranding and edema around the  pancreatic body and tail, overall decreased from the prior exam.  ?  Spleen: Normal.  ?  Adrenals: Normal.  ?  Kidneys and ureters: Normal.  ?  Bladder: Normal.  ?  Reproductive organs: No pelvic masses.  ?  Stomach/bowel: There are also inflammatory changes surrounding the  splenic flexure and proximal descending colon as well as the stomach.  There is no bowel obstruction.  ?  Lymph  nodes: No pathologically enlarged lymph node identified.  ?  Peritoneum: Previously described pancreatic pseudocysts have decreased  in size. For example a previously described pseudocyst superior to the  pancreas which measured 2.3 x 3.7 cm is now more ill-defined and  measures 2.2 x 2.4 cm (series 2, image 19). Fluid tracking along the  greater curvature of the stomach has also decreased in size and is now  less evident.  ?  Vessels: No abdominal aortic aneurysm.?  ?  Abdominal wall: Normal.  ?  Lung bases: No consolidation or pleural effusion.  ?  Bones: No acute osseous findings.?  ?  IMPRESSION:?  Residual inflammatory changes and edema centered around the pancreatic  body and tail, overall decreased from the prior exam, with decreased  size of previously described pseudocysts.?  ? [5]     [1]?XR UGI Series; Juanjose May MD 11/29/2020 16:06 CST  [2]?CT Abdomen and Pelvis W Contrast; Juanjose May MD 11/29/2020 11:47 CST  [3]?CT Abdomen and Pelvis W Contrast; Anshu Helms MD 12/09/2020 13:37 CST  [4]?CT Abdomen and Pelvis W Contrast; Lin Aj MD 12/12/2020 14:09 CST  [5]?CT Abdomen and Pelvis W Contrast; Mita Alvarado MD 12/25/2020 16:41 CST
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
## 2022-05-02 NOTE — HISTORICAL [facility]
This is a historical note converted from [employer]. Formatting and pictures may have been removed.  Please reference Angela for original formatting and attached multimedia. Admit and Discharge Dates  Admit Date: 12/09/2020  Discharge Date: 12/11/2020  Physicians  Attending Physician - James RAMON, Rebecca WARD  Admitting Physician - James RAMON, Rebecca WARD  Primary Care Physician - No PCP, No  Discharge Diagnosis  1. ?Acute pancreatitis.  2.? Chronic diarrhea associated with abdominal pain.  3.? Epilepsy.  4.? Hearing loss since birth?with cochlear implant.  5.? Schizoeffective disorder.  6. ?Tobacco abuse.  Surgical Procedures  No procedures recorded for this visit.  Hospital Course  Patient is a 30-year-old male?with significant?past medical history of?hearing loss with cochlear implant,?epilepsy?presented to ED on?12/9/2020?with chief complaint of?worsening abdominal pain. On his current presentation to ED patient had? elevated lipase, CT abdomen concerning for?acute pancreatitis with peripancreatic fluid collection.? He was further admitted to IM team?for acute pancreatitis.? There was a concern?that patient might be using valproic acid for his seizures,?recheck valproic acid levels?which was?low. ?Patient is a? tobacco user, at this point to attribute pancreatitis?likely secondary to tobacco abuse?patient received?aggressive IV fluids,?symptomatic treatment?with improvement in his symptoms.? Advanced diet as tolerated.? Patient was?able to tolerate regular diet with?no?symptoms?and was ready to?go home.  Of note?patient has been having?frequent visits?to ED?about 8 past?1 month, during his previous admission at?[facility]?patient was noted to have pancreatitis?likely due to?his valproic acid usage?and also got? thorough work-up?for his abdominal pain?including?ultrasound abdomen?without any?liver or?gallbladder pathology,?upper GI series within normal limits,?EGD?notable for?mild acute duodenitis?otherwise no ulcers?or  bleeding.  Given his chronic diarrhea,?started on work-up?for IBD?including lactoferrin and calprotectin,?results pending at the time of discharge.? So?patient was referred to [facility] IM clinic to establish?with PCP?and further follow-up.? If patient continues to have symptoms?or IBD work-up comes back positive?to consider?GI referral outpatient?and possible colonoscopy.  ?  Time Spent on discharge  More than 35 minutes spent on discharge.  Objective  Vitals & Measurements  T:?36.4? ?C (Oral)? TMIN:?36.4? ?C (Oral)? TMAX:?36.7? ?C (Oral)? HR:?81(Peripheral)? HR:?76(Monitored)? RR:?18? BP:?124/81? SpO2:?97%?  Physical Exam  Gen: well-nourished, well-developed? in no acute distress  HEENT: Normocephalic, atraumatic.  Neck: No JVD or carotid bruits. No thyromegaly. No lymphadenopathy.  Heart: RRR, no murmurs, gallops, clicks or rubs.  Lungs: CTAB without rales, wheezes or rhonchi. Normal work of breathing. Chest rise symmetrical on inspiration.  Abd: Soft, non-tender, non-distended and without guarding. No organomegaly. No obvious masses. Bowel sounds present.  Extremities: Radial and pedal pulses 2+ bilaterally, no LE edema.  MSK: No obvious deformities. Moves all extremities purposefully.  Neuro: Responds well to commands.  Skin: Warm, dry and without rashes.  ?  Patient Discharge Condition  Patient is clinically and hemodynamically stable on discharge  Discharge Disposition  Diet:?Regular diet  Activity:?Regular activity  Disposition:?To home.  Referral sent to [facility] IM clinic to establish PCP care.  ?   Discharge Medication Reconciliation  Prescribed  sucralfate (sucralfate 1 g/10 mL oral suspension)?1 gm, Oral, QID  traMADol (traMADol 50 mg oral tablet)?50 mg, Oral, q12hr, PRN for pain  Continue  busPIRone (busPIRone 10 mg oral tablet)?10 mg, Oral, BID  carvedilol (Coreg 3.125 mg oral tablet)?6.25 mg, Oral, BID  docusate (Colace 100 mg oral capsule)?100 mg, Oral, BID  levETIRAcetam (Keppra 500 mg oral tablet)?500 mg,  Oral, BID  ondansetron (Zofran 4 mg oral tablet)?4 mg, Oral, q8hr, PRN as needed for nausea/vomiting  paliperidone (Invega Sustenna 156 mg/mL intramuscular suspension, extended release)?156 mg, IM, qMonth  pantoprazole (Protonix 40 mg ORAL enteric coated tablet)?40 mg, Oral, Daily  potassium chloride (K-Dur 20 oral tablet, extended release)?20 mEq, Oral, Daily  risperiDONE (risperidone 2 mg oral tablet)?2 mg, Oral, BID  sertraline (sertraline 100 mg oral tablet)?100 mg, Oral, Daily  Discontinue  acetaminophen (acetaminophen 325 mg oral tablet)?650 mg, Oral, q6hr, PRN headache  divalproex sodium (divalproex sodium 500 mg oral delayed release(EC) tablet)?500 mg, Oral, BID  Education and Orders Provided  Acute Pancreatitis, Easy-to-Read  Smoking Cessation, Tips for Success  Discharge - 12/11/20 11:58:00 CST, Home?  Follow up  [facility], on 12/28/2020  ????Office will call w/follow up appointment      I was present with the Resident during discharge day management.  ???  [x ] I discussed the case with the Resident and agree with the discharge plan as above.  [ ] I discussed the case with the Resident and agree with the discharge plan as above except:  ???  Time spent on discharge [ 35] minutes

## 2022-05-04 ENCOUNTER — HOSPITAL ENCOUNTER (EMERGENCY)
Facility: HOSPITAL | Age: 32
Discharge: HOME | End: 2022-05-04
Attending: FAMILY MEDICINE
Payer: COMMERCIAL

## 2022-05-04 VITALS
HEIGHT: 70 IN | WEIGHT: 189.63 LBS | SYSTOLIC BLOOD PRESSURE: 118 MMHG | HEART RATE: 86 BPM | BODY MASS INDEX: 27.15 KG/M2 | TEMPERATURE: 98 F | DIASTOLIC BLOOD PRESSURE: 85 MMHG | RESPIRATION RATE: 18 BRPM | OXYGEN SATURATION: 100 %

## 2022-05-04 DIAGNOSIS — R51.9 ACUTE NONINTRACTABLE HEADACHE, UNSPECIFIED HEADACHE TYPE: Primary | ICD-10-CM

## 2022-05-04 PROCEDURE — 96372 THER/PROPH/DIAG INJ SC/IM: CPT | Performed by: PHYSICIAN ASSISTANT

## 2022-05-04 PROCEDURE — 99284 EMERGENCY DEPT VISIT MOD MDM: CPT

## 2022-05-04 PROCEDURE — 63600175 PHARM REV CODE 636 W HCPCS: Performed by: PHYSICIAN ASSISTANT

## 2022-05-04 RX ORDER — KETOROLAC TROMETHAMINE 30 MG/ML
60 INJECTION, SOLUTION INTRAMUSCULAR; INTRAVENOUS
Status: COMPLETED | OUTPATIENT
Start: 2022-05-04 | End: 2022-05-04

## 2022-05-04 RX ORDER — BUTALBITAL, ACETAMINOPHEN AND CAFFEINE 50; 325; 40 MG/1; MG/1; MG/1
1 TABLET ORAL EVERY 6 HOURS PRN
Qty: 20 TABLET | Refills: 0 | OUTPATIENT
Start: 2022-05-04 | End: 2022-05-26

## 2022-05-04 RX ORDER — METOCLOPRAMIDE HYDROCHLORIDE 5 MG/ML
10 INJECTION INTRAMUSCULAR; INTRAVENOUS
Status: COMPLETED | OUTPATIENT
Start: 2022-05-04 | End: 2022-05-04

## 2022-05-04 RX ADMIN — METOCLOPRAMIDE 10 MG: 5 INJECTION, SOLUTION INTRAMUSCULAR; INTRAVENOUS at 08:05

## 2022-05-04 RX ADMIN — KETOROLAC TROMETHAMINE 60 MG: 30 INJECTION, SOLUTION INTRAMUSCULAR at 08:05

## 2022-05-04 NOTE — Clinical Note
"Chun Patel" Amado was seen and treated in our emergency department on 5/4/2022.  He may return to work on 05/06/2022.       If you have any questions or concerns, please don't hesitate to call.      CHANDU Tyson"

## 2022-05-05 NOTE — ED PROVIDER NOTES
Encounter Date: 5/4/2022       History     Chief Complaint   Patient presents with    Headache     PT W CO HA SINCE LAST PM.  NO NV, CO BLURRED VISION W WATERY EYES.       32 YO AAM in ER with complaints of HA with nausea and photophobia since earlier today. Hx of similar headaches in past. Did not take any meds for it today. Rates pain as 10/10. Had 1 episode of non bloody vomiting today.Denies fever, chills, chest pain, SOB, abdominal pain, diarrhea or dizziness. No other complaints.        Review of patient's allergies indicates:  No Known Allergies  No past medical history on file.  Past Surgical History:   Procedure Laterality Date    INNER EAR SURGERY Right      No family history on file.  Social History     Tobacco Use    Smoking status: Heavy Tobacco Smoker     Packs/day: 1.00    Smokeless tobacco: Never Used     Review of Systems   Constitutional: Negative for chills and fever.   HENT: Negative for ear pain and sore throat.    Respiratory: Negative for shortness of breath.    Cardiovascular: Negative for chest pain.   Gastrointestinal: Positive for nausea and vomiting. Negative for abdominal pain and diarrhea.   Skin: Negative for rash.   Neurological: Positive for headaches. Negative for dizziness and weakness.       Physical Exam     Initial Vitals [05/04/22 1716]   BP Pulse Resp Temp SpO2   124/85 79 16 97.9 °F (36.6 °C) 100 %      MAP       --         Physical Exam    Nursing note and vitals reviewed.  Constitutional: He appears well-developed and well-nourished. No distress.   HENT:   Head: Normocephalic and atraumatic.   Eyes: Conjunctivae are normal.   Cardiovascular: Normal rate and regular rhythm.   Pulmonary/Chest: Breath sounds normal.     Neurological: He is alert and oriented to person, place, and time. No cranial nerve deficit. GCS score is 15. GCS eye subscore is 4. GCS verbal subscore is 5. GCS motor subscore is 6.   Skin: Skin is warm and dry.   Psychiatric: He has a normal mood and  affect.         ED Course   Procedures  Labs Reviewed - No data to display       Imaging Results    None          Medications   metoclopramide HCl injection 10 mg (10 mg Intramuscular Given 5/4/22 2031)   ketorolac injection 60 mg (60 mg Intramuscular Given 5/4/22 2031)                        9:13 PM  VSS, NAD, pt is non-toxic or ill appearing, HA has resolved after treatment in ER and he is ready to go home,  treatment plan and discharge instructions including follow up discussed, pt verbalized understanding, all questions answered, pt is stable and ready for discharge  Clinical Impression:   Final diagnoses:  [R51.9] Acute nonintractable headache, unspecified headache type (Primary)          ED Disposition Condition    Discharge Stable        ED Prescriptions     Medication Sig Dispense Start Date End Date Auth. Provider    butalbital-acetaminophen-caffeine -40 mg (FIORICET, ESGIC) -40 mg per tablet Take 1 tablet by mouth every 6 (six) hours as needed for Pain. 20 tablet 5/4/2022  CHANDU Tyson        Follow-up Information     Follow up With Specialties Details Why Contact Info    [facility] - Emergency Dept Emergency Medicine In 3 days As needed, If symptoms worsen [address] [facility] [address] [phone]           CHANDU Tyson  05/04/22 9008

## 2022-05-26 ENCOUNTER — HOSPITAL ENCOUNTER (EMERGENCY)
Facility: HOSPITAL | Age: 32
Discharge: HOME | End: 2022-05-26
Attending: EMERGENCY MEDICINE
Payer: COMMERCIAL

## 2022-05-26 VITALS
TEMPERATURE: 98 F | HEIGHT: 73 IN | HEART RATE: 81 BPM | OXYGEN SATURATION: 100 % | SYSTOLIC BLOOD PRESSURE: 130 MMHG | RESPIRATION RATE: 16 BRPM | BODY MASS INDEX: 23.96 KG/M2 | DIASTOLIC BLOOD PRESSURE: 86 MMHG | WEIGHT: 180.75 LBS

## 2022-05-26 DIAGNOSIS — G43.009 MIGRAINE WITHOUT AURA AND WITHOUT STATUS MIGRAINOSUS, NOT INTRACTABLE: Primary | ICD-10-CM

## 2022-05-26 LAB
ALBUMIN SERPL-MCNC: 3.8 GM/DL (ref 3.5–5)
ALBUMIN/GLOB SERPL: 1.1 RATIO (ref 1.1–2)
ALP SERPL-CCNC: 80 UNIT/L (ref 40–150)
ALT SERPL-CCNC: 27 UNIT/L (ref 0–55)
AST SERPL-CCNC: 31 UNIT/L (ref 5–34)
BASOPHILS # BLD AUTO: 0.01 X10(3)/MCL (ref 0–0.2)
BASOPHILS NFR BLD AUTO: 0.2 %
BILIRUBIN DIRECT+TOT PNL SERPL-MCNC: 0.5 MG/DL
BUN SERPL-MCNC: 4.3 MG/DL (ref 8.9–20.6)
CALCIUM SERPL-MCNC: 9.2 MG/DL (ref 8.4–10.2)
CHLORIDE SERPL-SCNC: 104 MMOL/L (ref 98–107)
CO2 SERPL-SCNC: 26 MMOL/L (ref 22–29)
CREAT SERPL-MCNC: 0.83 MG/DL (ref 0.73–1.18)
EOSINOPHIL # BLD AUTO: 0.11 X10(3)/MCL (ref 0–0.9)
EOSINOPHIL NFR BLD AUTO: 2.2 %
ERYTHROCYTE [DISTWIDTH] IN BLOOD BY AUTOMATED COUNT: 13.2 % (ref 11.5–17)
GLOBULIN SER-MCNC: 3.4 GM/DL (ref 2.4–3.5)
GLUCOSE SERPL-MCNC: 105 MG/DL (ref 74–100)
HCT VFR BLD AUTO: 37.9 % (ref 42–52)
HGB BLD-MCNC: 13.1 GM/DL (ref 14–18)
IMM GRANULOCYTES # BLD AUTO: 0.01 X10(3)/MCL (ref 0–0.02)
IMM GRANULOCYTES NFR BLD AUTO: 0.2 % (ref 0–0.43)
LYMPHOCYTES # BLD AUTO: 1.85 X10(3)/MCL (ref 0.6–4.6)
LYMPHOCYTES NFR BLD AUTO: 36.6 %
MCH RBC QN AUTO: 32.8 PG (ref 27–31)
MCHC RBC AUTO-ENTMCNC: 34.6 MG/DL (ref 33–36)
MCV RBC AUTO: 95 FL (ref 80–94)
MONOCYTES # BLD AUTO: 0.33 X10(3)/MCL (ref 0.1–1.3)
MONOCYTES NFR BLD AUTO: 6.5 %
NEUTROPHILS # BLD AUTO: 2.8 X10(3)/MCL (ref 2.1–9.2)
NEUTROPHILS NFR BLD AUTO: 54.3 %
NRBC BLD AUTO-RTO: 0 %
PLATELET # BLD AUTO: 224 X10(3)/MCL (ref 130–400)
PMV BLD AUTO: 10.4 FL (ref 9.4–12.4)
POTASSIUM SERPL-SCNC: 3.5 MMOL/L (ref 3.5–5.1)
PROT SERPL-MCNC: 7.2 GM/DL (ref 6.4–8.3)
RBC # BLD AUTO: 3.99 X10(6)/MCL (ref 4.7–6.1)
SODIUM SERPL-SCNC: 140 MMOL/L (ref 136–145)
WBC # SPEC AUTO: 5.1 X10(3)/MCL (ref 4.5–11.5)

## 2022-05-26 PROCEDURE — 99285 EMERGENCY DEPT VISIT HI MDM: CPT | Mod: 25

## 2022-05-26 PROCEDURE — 80053 COMPREHEN METABOLIC PANEL: CPT | Performed by: PHYSICIAN ASSISTANT

## 2022-05-26 PROCEDURE — 36415 COLL VENOUS BLD VENIPUNCTURE: CPT | Performed by: PHYSICIAN ASSISTANT

## 2022-05-26 PROCEDURE — 96372 THER/PROPH/DIAG INJ SC/IM: CPT | Performed by: PHYSICIAN ASSISTANT

## 2022-05-26 PROCEDURE — 85025 COMPLETE CBC W/AUTO DIFF WBC: CPT | Performed by: PHYSICIAN ASSISTANT

## 2022-05-26 PROCEDURE — 63600175 PHARM REV CODE 636 W HCPCS: Performed by: PHYSICIAN ASSISTANT

## 2022-05-26 RX ORDER — DIPHENHYDRAMINE HYDROCHLORIDE 50 MG/ML
25 INJECTION INTRAMUSCULAR; INTRAVENOUS
Status: COMPLETED | OUTPATIENT
Start: 2022-05-26 | End: 2022-05-26

## 2022-05-26 RX ORDER — BUTALBITAL, ACETAMINOPHEN AND CAFFEINE 50; 325; 40 MG/1; MG/1; MG/1
1 TABLET ORAL EVERY 4 HOURS PRN
Qty: 30 TABLET | Refills: 0 | Status: SHIPPED | OUTPATIENT
Start: 2022-05-26 | End: 2022-07-05

## 2022-05-26 RX ORDER — METOCLOPRAMIDE HYDROCHLORIDE 5 MG/ML
10 INJECTION INTRAMUSCULAR; INTRAVENOUS
Status: COMPLETED | OUTPATIENT
Start: 2022-05-26 | End: 2022-05-26

## 2022-05-26 RX ORDER — KETOROLAC TROMETHAMINE 30 MG/ML
60 INJECTION, SOLUTION INTRAMUSCULAR; INTRAVENOUS
Status: COMPLETED | OUTPATIENT
Start: 2022-05-26 | End: 2022-05-26

## 2022-05-26 RX ADMIN — METOCLOPRAMIDE 10 MG: 5 INJECTION, SOLUTION INTRAMUSCULAR; INTRAVENOUS at 05:05

## 2022-05-26 RX ADMIN — KETOROLAC TROMETHAMINE 60 MG: 30 INJECTION, SOLUTION INTRAMUSCULAR at 05:05

## 2022-05-26 RX ADMIN — DIPHENHYDRAMINE HYDROCHLORIDE 25 MG: 50 INJECTION INTRAMUSCULAR; INTRAVENOUS at 05:05

## 2022-05-26 NOTE — ED PROVIDER NOTES
Name: Chun Fischer   Age: 32 y.o.  Sex: male    Chief complaint:   Chief Complaint   Patient presents with    Headache     PT W CO HA X 2 DAYS.  OTC MED NOT HELPING. CO LIGHT SEN. AND INCREASE IN PAIN W MOVEMENT. PT CRYING DUE TO PAIN.       Patient arrived with: Private  History obtained from: Patient via sign language services    Subjective:   Patient who is deaf with hx of chronic/frequent headaches presents today with his grandfather c/o generalized throbbing headache for 2 days. Patient says he has been at work all day and people were yelling loudly nonstop which made his headache even worse. He also endorses photophobia and increased pain with movement. He has tried taking advil at home with no improvement. He was recently seen for similar headache and prescribed fiorcet, but did not ever try taking it. He said he was not aware he was prescribed any medication. Denies head injury/trauma, nausea/vomiting, dizziness, vision changes, anam pain, syncope, focal weakness, uri symptoms, sore throat.     History reviewed. No pertinent past medical history.  Past Surgical History:   Procedure Laterality Date    INNER EAR SURGERY Right      Social History     Socioeconomic History    Marital status: Single   Tobacco Use    Smoking status: Heavy Tobacco Smoker     Packs/day: 1.00    Smokeless tobacco: Never Used     Review of patient's allergies indicates:  No Known Allergies     Review of Systems   Constitutional: Negative for chills and fever.   HENT: Positive for hearing loss. Negative for congestion, sore throat and tinnitus.    Eyes: Positive for photophobia. Negative for blurred vision, double vision and pain.   Respiratory: Negative for shortness of breath.    Cardiovascular: Negative for chest pain.   Gastrointestinal: Negative for abdominal pain, diarrhea, nausea and vomiting.   Genitourinary: Negative for flank pain.   Musculoskeletal: Negative for myalgias and neck pain.   Skin: Negative for itching  and rash.   Neurological: Positive for headaches. Negative for dizziness, tingling, tremors, sensory change, speech change, focal weakness, seizures, loss of consciousness and weakness.          Objective:     Initial Vitals [05/26/22 1558]   BP Pulse Resp Temp SpO2   (!) 127/90 105 18 97.9 °F (36.6 °C) 98 %      MAP       --            Physical Exam  Vitals reviewed.   Constitutional:       General: He is not in acute distress.     Appearance: Normal appearance. He is not ill-appearing, toxic-appearing or diaphoretic.   HENT:      Head: Normocephalic and atraumatic.      Mouth/Throat:      Mouth: Mucous membranes are moist.   Eyes:      Extraocular Movements: Extraocular movements intact.      Conjunctiva/sclera: Conjunctivae normal.   Neck:      Comments: No meningeal signs  Cardiovascular:      Rate and Rhythm: Normal rate and regular rhythm.      Pulses: Normal pulses.      Heart sounds: Normal heart sounds.   Pulmonary:      Effort: Pulmonary effort is normal.      Breath sounds: Normal breath sounds.   Abdominal:      General: Abdomen is flat. Bowel sounds are normal.      Palpations: Abdomen is soft.   Musculoskeletal:         General: No deformity.      Cervical back: Normal range of motion and neck supple. No rigidity.   Skin:     General: Skin is warm and dry.      Capillary Refill: Capillary refill takes less than 2 seconds.   Neurological:      General: No focal deficit present.      Mental Status: He is alert and oriented to person, place, and time. Mental status is at baseline.      Cranial Nerves: No cranial nerve deficit.      Sensory: No sensory deficit.      Motor: No weakness.      Coordination: Coordination normal.      Gait: Gait normal.   Psychiatric:         Mood and Affect: Mood normal.          Records:  Nursing records and triage records reviewed  Prior records reviewed    Labs:  Recent Results (from the past 24 hour(s))   Comprehensive Metabolic Panel    Collection Time: 05/26/22  5:22 PM    Result Value Ref Range    Sodium Level 140 136 - 145 mmol/L    Potassium Level 3.5 3.5 - 5.1 mmol/L    Chloride 104 98 - 107 mmol/L    Carbon Dioxide 26 22 - 29 mmol/L    Glucose Level 105 (H) 74 - 100 mg/dL    Blood Urea Nitrogen 4.3 (L) 8.9 - 20.6 mg/dL    Creatinine 0.83 0.73 - 1.18 mg/dL    Calcium Level Total 9.2 8.4 - 10.2 mg/dL    Protein Total 7.2 6.4 - 8.3 gm/dL    Albumin Level 3.8 3.5 - 5.0 gm/dL    Globulin 3.4 2.4 - 3.5 gm/dL    Albumin/Globulin Ratio 1.1 1.1 - 2.0 ratio    Bilirubin Total 0.5 <=1.5 mg/dL    Alkaline Phosphatase 80 40 - 150 unit/L    Alanine Aminotransferase 27 0 - 55 unit/L    Aspartate Aminotransferase 31 5 - 34 unit/L    Estimated GFR- >60 mls/min/1.73/m2   CBC with Differential    Collection Time: 05/26/22  5:22 PM   Result Value Ref Range    WBC 5.1 4.5 - 11.5 x10(3)/mcL    RBC 3.99 (L) 4.70 - 6.10 x10(6)/mcL    Hgb 13.1 (L) 14.0 - 18.0 gm/dL    Hct 37.9 (L) 42.0 - 52.0 %    MCV 95.0 (H) 80.0 - 94.0 fL    MCH 32.8 (H) 27.0 - 31.0 pg    MCHC 34.6 33.0 - 36.0 mg/dL    RDW 13.2 11.5 - 17.0 %    Platelet 224 130 - 400 x10(3)/mcL    MPV 10.4 9.4 - 12.4 fL    Neut % 54.3 %    Lymph % 36.6 %    Mono % 6.5 %    Eos % 2.2 %    Basophil % 0.2 %    Lymph # 1.85 0.6 - 4.6 x10(3)/mcL    Neut # 2.8 2.1 - 9.2 x10(3)/mcL    Mono # 0.33 0.1 - 1.3 x10(3)/mcL    Eos # 0.11 0 - 0.9 x10(3)/mcL    Baso # 0.01 0 - 0.2 x10(3)/mcL    IG# 0.01 0 - 0.0155 x10(3)/mcL    IG% 0.2 0 - 0.43 %    NRBC% 0.0 %        Images:  No results found.   Imaging Results          CT Head Without Contrast (Final result)  Result time 05/26/22 18:19:23    Final result by Levi Tariq MD (05/26/22 18:19:23)                 Impression:      Limited study.  No acute intracranial findings identified.      Electronically signed by: Levi Tariq  Date:    05/26/2022  Time:    18:19             Narrative:    EXAMINATION:  CT HEAD WITHOUT CONTRAST    CLINICAL HISTORY:  Headache, sudden,  severe;    TECHNIQUE:  Sequential axial images were performed of the brain without contrast.    Dose product length of 969 mGycm. Automated exposure control was utilized to minimize radiation dose.    COMPARISON:  None available.    FINDINGS:  Cochlear implant over the right scalp cause streak artifacts degrading several of the images.  Right mastoid operative changes.  As visualized, there is no intracranial mass effect, midline shift, hydrocephalus or hemorrhage. There is no sulcal effacement or low attenuation changes to suggest recent large vessel territory infarction. There is no definite acute extra axial fluid collection. Visualized paranasal sinuses are clear without mucosal thickening, polypoidal abnormality or air-fluid levels. Mastoid air cells aeration is optimal.                                   Medications:  Medications   ketorolac injection 60 mg (60 mg Intramuscular Given 5/26/22 1725)   diphenhydrAMINE injection 25 mg (25 mg Intramuscular Given 5/26/22 1725)   metoclopramide HCl injection 10 mg (10 mg Intramuscular Given 5/26/22 1725)        Medical decision making:          Procedures     Diagnosis:  Final diagnoses:  [G43.009] Migraine without aura and without status migrainosus, not intractable (Primary)     Chun Fischer discharge to home/self care.    - Condition at discharge: Stable  - Mode of Discharge: by walking out   - The discharge instructions were discussed with the patient/parent.  - They state an understanding of the discharge instructions. Patient aware he was sent rx to pharmacy.   - Advised to f/u with pcp within 1-2 days. ED precautions given.     ED Prescriptions     Medication Sig Dispense Start Date End Date Auth. Provider    butalbital-acetaminophen-caffeine -40 mg (FIORICET, ESGIC) -40 mg per tablet Take 1 tablet by mouth every 4 (four) hours as needed for Headaches. 30 tablet 5/26/2022  CHANDU Pickering          Follow-up Information     Follow up  With Specialties Details Why Contact Info    [facility] - Emergency Dept Emergency Medicine  If symptoms worsen return to ED immediately [address] [facility] [address] [phone]    Primary Care Provider within 1-2 days  Go in 1 day              (Please note that this chart was completed via voice to text dictation. There may be typographical errors or substitutions that are unintentional, or uncorrected. Every attempt was made to proofread the chart prior to completion. If there are any questions, please contact the provider for final clarification).       CHANDU Pickering  05/26/22 7381

## 2022-06-01 ENCOUNTER — HOSPITAL ENCOUNTER (EMERGENCY)
Facility: HOSPITAL | Age: 32
Discharge: HOME | End: 2022-06-01
Attending: STUDENT IN AN ORGANIZED HEALTH CARE EDUCATION/TRAINING PROGRAM
Payer: COMMERCIAL

## 2022-06-01 VITALS
RESPIRATION RATE: 16 BRPM | DIASTOLIC BLOOD PRESSURE: 93 MMHG | SYSTOLIC BLOOD PRESSURE: 143 MMHG | TEMPERATURE: 98 F | HEART RATE: 97 BPM | OXYGEN SATURATION: 99 %

## 2022-06-01 DIAGNOSIS — G44.209 TENSION HEADACHE: Primary | ICD-10-CM

## 2022-06-01 PROCEDURE — 96361 HYDRATE IV INFUSION ADD-ON: CPT

## 2022-06-01 PROCEDURE — 99284 EMERGENCY DEPT VISIT MOD MDM: CPT | Mod: 25

## 2022-06-01 PROCEDURE — 63600175 PHARM REV CODE 636 W HCPCS: Performed by: PHYSICIAN ASSISTANT

## 2022-06-01 PROCEDURE — 25000003 PHARM REV CODE 250: Performed by: PHYSICIAN ASSISTANT

## 2022-06-01 PROCEDURE — 96375 TX/PRO/DX INJ NEW DRUG ADDON: CPT

## 2022-06-01 PROCEDURE — 96374 THER/PROPH/DIAG INJ IV PUSH: CPT

## 2022-06-01 RX ORDER — METOCLOPRAMIDE HYDROCHLORIDE 5 MG/ML
10 INJECTION INTRAMUSCULAR; INTRAVENOUS
Status: COMPLETED | OUTPATIENT
Start: 2022-06-01 | End: 2022-06-01

## 2022-06-01 RX ORDER — KETOROLAC TROMETHAMINE 30 MG/ML
30 INJECTION, SOLUTION INTRAMUSCULAR; INTRAVENOUS
Status: COMPLETED | OUTPATIENT
Start: 2022-06-01 | End: 2022-06-01

## 2022-06-01 RX ORDER — DIPHENHYDRAMINE HYDROCHLORIDE 50 MG/ML
12.5 INJECTION INTRAMUSCULAR; INTRAVENOUS
Status: COMPLETED | OUTPATIENT
Start: 2022-06-01 | End: 2022-06-01

## 2022-06-01 RX ADMIN — METOCLOPRAMIDE 10 MG: 5 INJECTION, SOLUTION INTRAMUSCULAR; INTRAVENOUS at 02:06

## 2022-06-01 RX ADMIN — KETOROLAC TROMETHAMINE 30 MG: 30 INJECTION, SOLUTION INTRAMUSCULAR at 02:06

## 2022-06-01 RX ADMIN — DIPHENHYDRAMINE HYDROCHLORIDE 12.5 MG: 50 INJECTION, SOLUTION INTRAMUSCULAR; INTRAVENOUS at 02:06

## 2022-06-01 RX ADMIN — SODIUM CHLORIDE 1000 ML: 9 INJECTION, SOLUTION INTRAVENOUS at 02:06

## 2022-06-01 NOTE — FIRST PROVIDER EVALUATION
Medical screening exam completed.  I have conducted a focused provider triage encounter, findings are as follows:    Brief history of present illness:  32 y.o. male presents to the ED with intractable headache for the last week. States he was seen at [facility] and WA'ed home with Fioricet which he says are not helping. Denies n/v, blurred vision. Had head CT a week ago as well. Hx of chronic headaches.     There were no vitals filed for this visit.    Pertinent physical exam:  Awake, alert, ambulatory, non-labored respirations    Brief workup plan:  ivf  meds    Preliminary workup initiated; this workup will be continued and followed by the physician or advanced practice provider that is assigned to the patient when roomed.

## 2022-06-01 NOTE — ED PROVIDER NOTES
Encounter Date: 6/1/2022       History     Chief Complaint   Patient presents with    Headache     Pt to ER via POV for HA.  Started 3 days ago.  States has been seen, but meds not helping.  Was seen at [facility] last week for same.  Hx of VARNER     32 y.o. male presents to the ED with intractable headache for the last week. States he was seen at [facility] and NM'ed home with Fioricet which he says are not helping. Denies n/v, blurred vision. Had head CT a week ago as well. Hx of chronic headaches.    The history is provided by the patient. No  was used.   Headache   This is a chronic problem. The problem occurs constantly. The problem has been unchanged. The pain is located in the bilateral region. The pain does not radiate. The pain quality is similar to prior headaches. The quality of the pain is described as throbbing. Pertinent negatives include no back pain, fever, nausea, sore throat or weakness. Nothing aggravates the symptoms. Treatments tried: fioricet. The treatment provided no relief. His past medical history is significant for migraine headaches.     Review of patient's allergies indicates:  No Known Allergies  No past medical history on file.  Past Surgical History:   Procedure Laterality Date    INNER EAR SURGERY Right      No family history on file.  Social History     Tobacco Use    Smoking status: Heavy Tobacco Smoker     Packs/day: 1.00    Smokeless tobacco: Never Used     Review of Systems   Constitutional: Negative for fever.   HENT: Negative for sore throat.    Respiratory: Negative for shortness of breath.    Cardiovascular: Negative for chest pain.   Gastrointestinal: Negative for nausea.   Genitourinary: Negative for dysuria.   Musculoskeletal: Negative for back pain.   Skin: Negative for rash.   Neurological: Positive for headaches. Negative for weakness.   Hematological: Does not bruise/bleed easily.   All other systems reviewed and are negative.      Physical Exam     Initial  Vitals [06/01/22 1311]   BP Pulse Resp Temp SpO2   129/88 97 18 97.9 °F (36.6 °C) (!) 94 %      MAP       --         Physical Exam    Nursing note and vitals reviewed.  Constitutional: He appears well-developed and well-nourished.   HENT:   Head: Normocephalic and atraumatic.   Eyes: Conjunctivae and EOM are normal. Pupils are equal, round, and reactive to light.   Neck: Neck supple.   Normal range of motion.   Full passive range of motion without pain.     Cardiovascular: Normal rate, regular rhythm and normal heart sounds.   Pulmonary/Chest: Breath sounds normal.   Abdominal: Abdomen is soft.   Musculoskeletal:         General: Normal range of motion.      Cervical back: Full passive range of motion without pain, normal range of motion and neck supple.     Neurological: He is alert and oriented to person, place, and time. He has normal strength. GCS score is 15. GCS eye subscore is 4. GCS verbal subscore is 5. GCS motor subscore is 6.   No meningeal signs noted   Skin: Skin is warm and dry.   Psychiatric: He has a normal mood and affect.         ED Course   Procedures  Labs Reviewed - No data to display       Imaging Results    None          Medications   sodium chloride 0.9% bolus 1,000 mL (0 mLs Intravenous Stopped 6/1/22 1537)   ketorolac injection 30 mg (30 mg Intravenous Given 6/1/22 1437)   metoclopramide HCl injection 10 mg (10 mg Intravenous Given 6/1/22 1431)   diphenhydrAMINE injection 12.5 mg (12.5 mg Intravenous Given 6/1/22 1433)     Medical Decision Making:   Differential Diagnosis:   Chronic headaches, migraine, tension headache             ED Course as of 06/01/22 1825 Wed Jun 01, 2022   1613 4:13 PM I reassessed the pt.  The pt is resting comfortably and is NAD. Notes feeling much better. Discussed test results, shared treatment plan, specific conditions for return, and the need for f/u.  Answered their questions at this time.  Pt understands and agrees to the plan.  The pt has remained  hemodynamically stable through ED course and is stable for discharge.    [MA]      ED Course User Index  [MA] Blair Gonsalez PA-C             Clinical Impression:   Final diagnoses:  [G44.209] Tension headache (Primary)          ED Disposition Condition    Discharge Stable        ED Prescriptions     None        Follow-up Information     Follow up With Specialties Details Why Contact Info    Primary care provider  In 2 days As needed     [facility] - Emergency Dept Emergency Medicine In 1 week If symptoms worsen [address] [facility] [address]  [phone]           Blair Gonsalez PA-C  06/01/22 1821

## 2022-06-01 NOTE — Clinical Note
"Chun Patel" Amado was seen and treated in our emergency department on 6/1/2022.  He may return to work on 06/02/2022.       If you have any questions or concerns, please don't hesitate to call.      FREDDY Helms RN    "

## 2022-07-05 ENCOUNTER — OFFICE VISIT (OUTPATIENT)
Dept: GASTROENTEROLOGY | Facility: CLINIC | Age: 32
End: 2022-07-05
Payer: COMMERCIAL

## 2022-07-05 VITALS
WEIGHT: 198.81 LBS | SYSTOLIC BLOOD PRESSURE: 128 MMHG | BODY MASS INDEX: 29.45 KG/M2 | DIASTOLIC BLOOD PRESSURE: 90 MMHG | OXYGEN SATURATION: 96 % | HEART RATE: 103 BPM | RESPIRATION RATE: 16 BRPM | HEIGHT: 69 IN | TEMPERATURE: 98 F

## 2022-07-05 DIAGNOSIS — K86.1 CHRONIC PANCREATITIS, UNSPECIFIED PANCREATITIS TYPE: Primary | ICD-10-CM

## 2022-07-05 DIAGNOSIS — Z72.0 TOBACCO USER: ICD-10-CM

## 2022-07-05 PROCEDURE — 99215 OFFICE O/P EST HI 40 MIN: CPT | Mod: PBBFAC | Performed by: NURSE PRACTITIONER

## 2022-07-05 PROCEDURE — 3008F PR BODY MASS INDEX (BMI) DOCUMENTED: ICD-10-PCS | Mod: CPTII,,, | Performed by: NURSE PRACTITIONER

## 2022-07-05 PROCEDURE — 1160F RVW MEDS BY RX/DR IN RCRD: CPT | Mod: CPTII,,, | Performed by: NURSE PRACTITIONER

## 2022-07-05 PROCEDURE — 3080F PR MOST RECENT DIASTOLIC BLOOD PRESSURE >= 90 MM HG: ICD-10-PCS | Mod: CPTII,,, | Performed by: NURSE PRACTITIONER

## 2022-07-05 PROCEDURE — 3080F DIAST BP >= 90 MM HG: CPT | Mod: CPTII,,, | Performed by: NURSE PRACTITIONER

## 2022-07-05 PROCEDURE — 1159F PR MEDICATION LIST DOCUMENTED IN MEDICAL RECORD: ICD-10-PCS | Mod: CPTII,,, | Performed by: NURSE PRACTITIONER

## 2022-07-05 PROCEDURE — 99214 PR OFFICE/OUTPT VISIT, EST, LEVL IV, 30-39 MIN: ICD-10-PCS | Mod: S$PBB,,, | Performed by: NURSE PRACTITIONER

## 2022-07-05 PROCEDURE — 99214 OFFICE O/P EST MOD 30 MIN: CPT | Mod: S$PBB,,, | Performed by: NURSE PRACTITIONER

## 2022-07-05 PROCEDURE — 3008F BODY MASS INDEX DOCD: CPT | Mod: CPTII,,, | Performed by: NURSE PRACTITIONER

## 2022-07-05 PROCEDURE — 1159F MED LIST DOCD IN RCRD: CPT | Mod: CPTII,,, | Performed by: NURSE PRACTITIONER

## 2022-07-05 PROCEDURE — 3074F SYST BP LT 130 MM HG: CPT | Mod: CPTII,,, | Performed by: NURSE PRACTITIONER

## 2022-07-05 PROCEDURE — 1160F PR REVIEW ALL MEDS BY PRESCRIBER/CLIN PHARMACIST DOCUMENTED: ICD-10-PCS | Mod: CPTII,,, | Performed by: NURSE PRACTITIONER

## 2022-07-05 PROCEDURE — 3074F PR MOST RECENT SYSTOLIC BLOOD PRESSURE < 130 MM HG: ICD-10-PCS | Mod: CPTII,,, | Performed by: NURSE PRACTITIONER

## 2022-07-05 RX ORDER — SERTRALINE HYDROCHLORIDE 100 MG/1
100 TABLET, FILM COATED ORAL 2 TIMES DAILY
COMMUNITY
Start: 2022-06-17 | End: 2024-03-11

## 2022-07-05 RX ORDER — PALIPERIDONE PALMITATE 156 MG/ML
156 INJECTION INTRAMUSCULAR
COMMUNITY
Start: 2022-06-17 | End: 2024-03-11

## 2022-07-05 RX ORDER — DIVALPROEX SODIUM 500 MG/1
1000 TABLET, FILM COATED, EXTENDED RELEASE ORAL NIGHTLY
COMMUNITY
Start: 2022-06-17 | End: 2023-06-21 | Stop reason: SDUPTHER

## 2022-07-05 RX ORDER — BUSPIRONE HYDROCHLORIDE 10 MG/1
10 TABLET ORAL 2 TIMES DAILY
COMMUNITY
Start: 2022-06-17 | End: 2024-03-11

## 2022-07-05 RX ORDER — RISPERIDONE 1 MG/1
1 TABLET ORAL 2 TIMES DAILY
COMMUNITY
Start: 2022-06-17 | End: 2024-03-11

## 2022-07-05 NOTE — PROGRESS NOTES
Subjective:       Patient ID: Chun Fischer is a 32 y.o. male.    Chief Complaint: Pancreatitis (Follow up)    This 32-year-old -American male with a history of schizoaffective disorder, epilepsy, depression, and hearing impairment/cochlear implant presents accompanied by his grandfather for a follow-up visit.  He was initially seen March 9, 2021, referred for hospital follow-up at that time.  He was hospitalized at [facility] November 29, 2020 to December 7, 2020 for acute pancreatitis and abdominal pain.  He presented to the ED with reports of intermittent generalized abdominal pain with associated nausea and vomiting for the past 2 months that had worsened over time.  Majority of the patient's history was obtained from his grandfather as he was a poor historian.  His nausea and vomiting occurred after eating and was not associated with abdominal pain.  Upon arrival to ED, he was afebrile and hemodynamically stable.  Abdominal ultrasound was unremarkable and CBD measured 3 to 4 mm.  CT scan abdomen and pelvis with contrast identified an interval development of disorganized fluid adjacent to the proximal duodenum; nonspecific but may be suggestive of duodenitis, pancreatitis, sequela of duodenal ulcer with an element of constipation.  Initial laboratory results were unrevealing with exception of mildly elevated lipase (78).  He was evaluated by surgery with no surgical intervention recommended.  Upper GI series was unremarkable.  He was started on Protonix 40 mg IV twice daily.  He reported taking Tylenol at night periodically to help him sleep but denied any significant NSAID use.  He underwent EGD with Dr. CINTHYA Schultz November 30, 2020 with findings of normal esophagus, normal stomach, and duodenal deformity that was biopsied.  Pathology revealed focal minimal acute duodenitis with no evidence of dysplasia or malignancy.  Follow-up lipase was elevated and he was treated for acute pancreatitis with IV  hydration, bowel rest and pain control.  Abdominal pain slowly improved and his pancreatitis was attributed to Depakote use and the medication was discontinued.  Hospital course was complicated with acute shortness of breath suspicious for aspiration.  SLP evaluated the patient recommended smaller bolus and soft diet.  His respiratory status improved and he was eventually transitioned to room air.  Cultures were negative.  Abdominal symptoms improved and antibiotics were discontinued.  He was discharged home on Keppra 5 mg twice daily.    He was hospitalized at [facility] December 9, 2020 to December 11, 2020 for acute pancreatitis and chronic diarrhea.  CT scan abdomen and pelvis with contrast revealed pancreatitis with increased pancreatic inflammation since November 29, 2020, 50 mm confluent area of fluid along the greater curvature of the stomach suggestive of developing acute peripancreatic fluid collection.  He was admitted to medicine service for acute pancreatitis.  There was concern he was using valproic acid for his seizures and recheck valproic acid levels were low.  He continued to smoke cigarettes and pancreatitis was attributed likely to tobacco use.  He received aggressive IV fluids and was treated symptomatically for his symptoms.  His diet was advanced and he was eventually able to tolerate a regular diet.  Stool studies were negative, aside from fecal leukocytes which were positive.  He was ultimately discharged home December 11, 2020.    He was hospitalized at [facility] December 13, 2020 to December 15, 2020 for acute pancreatitis with reports of abdominal pain and diarrhea.  He reported being compliant with Keppra and off of valproic acid as previously directed.  He had eaten a large meal consisting of mashed potatoes and gravy, as well as hotdogs and began with symptoms shortly after.  He was treated with IV fluids and diet was advanced.  Symptoms eventually improved.  He was counseled again on smoking  cessation and discharged home December 15, 2020.    Abdominal ultrasound December 22, 2020 was unremarkable.  CT scan abdomen and pelvis with contrast December 25, 2020 revealed residual inflammatory changes and edema centered around the pancreatic body and tail, overall decreased from the prior exam, with decreased size of previously described pseudocyst.    When seen in March 2021, he reported feeling well.  He continued to take pantoprazole 40 mils daily and remained off of valproic acid and on Keppra as directed.  His appetite was good and his weight was stable.  He was eating 2 meals per day.  He denied nocturnal symptoms, fever, chills, odynophagia, dysphagia, nausea, vomiting, hematemesis, acid reflux, heartburn, early satiety, or abdominal pain.  His diarrhea had resolved and he reported having 1-2 soft to formed stools daily without melena, hematochezia, fecal urgency, fecal incontinence, or pain with defecation.    Laboratory results March 19, 2021 revealed unremarkable CMP; triglycerides 115; hemoglobin 13.3, MPV 11.2, absolute neutrophils 2.02, and otherwise unremarkable CBC; IgG4 63.  EUS was ordered but we were unable to reach the patient for scheduling.    He was seen for a follow-up visit June 29, 2021.  His grandfather reported that he had been doing well and denied appetite changes, unintentional weight loss, fever, chills, nausea, vomiting, odynophagia, dysphagia, acid reflux, heartburn, early satiety, or abdominal pain.  Bowel habits were described as formed stools once daily without melena, hematochezia, fecal urgency, fecal incontinence, or pain with defecation.    He underwent EUS with Dr. Aquino August 13, 2021 with findings suggestive of mild to moderate chronic pancreatitis and 1 lymph node versus resolved fluid collection visualized and measured in the cira hepatis region, otherwise unremarkable.    When last seen January 4, 2022, he reported doing well without any change in symptoms  from previous office visit.  He had no further bouts of pancreatitis last office visit in June 2021.    Today, he presents for a follow-up visit. He reports continuing to do well without any change in symptoms from previous office visit.    He denies ever having a colonoscopy done.  He denies regular NSAID use or use of blood thinners.  He smokes 20-30 cigarettes daily and denies alcohol use.  He denies illicit drug use.  He denies a family history of IBD, colon polyps, or colon cancer.      Review of patient's allergies indicates:  No Known Allergies    Past Medical History:   Diagnosis Date    Migraines     Seizures      Past Surgical History:   Procedure Laterality Date    INNER EAR SURGERY Right      Family History:   family history includes Hypertension in his mother.    Social History:    reports that he has been smoking. He has been smoking about 1.00 pack per day. He has never used smokeless tobacco. He reports previous alcohol use. He reports that he does not use drugs.    Review of Systems   All other systems reviewed and are negative.        Objective:      Physical Exam  Constitutional:       Appearance: Normal appearance.   HENT:      Head: Normocephalic.      Mouth/Throat:      Mouth: Mucous membranes are moist.   Eyes:      Extraocular Movements: Extraocular movements intact.      Conjunctiva/sclera: Conjunctivae normal.      Pupils: Pupils are equal, round, and reactive to light.   Cardiovascular:      Rate and Rhythm: Normal rate and regular rhythm.      Pulses: Normal pulses.      Heart sounds: Normal heart sounds.   Pulmonary:      Effort: Pulmonary effort is normal.      Breath sounds: Normal breath sounds.   Abdominal:      General: Bowel sounds are normal.      Palpations: Abdomen is soft.   Musculoskeletal:         General: Normal range of motion.      Cervical back: Normal range of motion and neck supple.   Skin:     General: Skin is warm and dry.   Neurological:      General: No focal  deficit present.      Mental Status: He is alert and oriented to person, place, and time.   Psychiatric:         Mood and Affect: Mood normal.         Behavior: Behavior normal.         Thought Content: Thought content normal.         Judgment: Judgment normal.         Home Medications:     Current Outpatient Medications   Medication Sig    INVEGA SUSTENNA 156 mg/mL Syrg injection Inject 156 mg into the muscle every 30 days.    busPIRone (BUSPAR) 10 MG tablet Take 10 mg by mouth 2 (two) times daily.    butalbital-acetaminophen-caffeine -40 mg (FIORICET, ESGIC) -40 mg per tablet Take 1 tablet by mouth every 4 (four) hours as needed for Headaches.    divalproex ER (DEPAKOTE) 500 MG Tb24 Take 1,000 mg by mouth nightly.    risperiDONE (RISPERDAL) 1 MG tablet Take 1 mg by mouth 2 (two) times daily.    sertraline (ZOLOFT) 100 MG tablet Take 100 mg by mouth 2 (two) times daily.     No current facility-administered medications for this visit.       Laboratory Results:     Recent Results (from the past 1344 hour(s))   Comprehensive Metabolic Panel    Collection Time: 05/26/22  5:22 PM   Result Value Ref Range    Sodium Level 140 136 - 145 mmol/L    Potassium Level 3.5 3.5 - 5.1 mmol/L    Chloride 104 98 - 107 mmol/L    Carbon Dioxide 26 22 - 29 mmol/L    Glucose Level 105 (H) 74 - 100 mg/dL    Blood Urea Nitrogen 4.3 (L) 8.9 - 20.6 mg/dL    Creatinine 0.83 0.73 - 1.18 mg/dL    Calcium Level Total 9.2 8.4 - 10.2 mg/dL    Protein Total 7.2 6.4 - 8.3 gm/dL    Albumin Level 3.8 3.5 - 5.0 gm/dL    Globulin 3.4 2.4 - 3.5 gm/dL    Albumin/Globulin Ratio 1.1 1.1 - 2.0 ratio    Bilirubin Total 0.5 <=1.5 mg/dL    Alkaline Phosphatase 80 40 - 150 unit/L    Alanine Aminotransferase 27 0 - 55 unit/L    Aspartate Aminotransferase 31 5 - 34 unit/L    Estimated GFR- >60 mls/min/1.73/m2   CBC with Differential    Collection Time: 05/26/22  5:22 PM   Result Value Ref Range    WBC 5.1 4.5 - 11.5 x10(3)/mcL     RBC 3.99 (L) 4.70 - 6.10 x10(6)/mcL    Hgb 13.1 (L) 14.0 - 18.0 gm/dL    Hct 37.9 (L) 42.0 - 52.0 %    MCV 95.0 (H) 80.0 - 94.0 fL    MCH 32.8 (H) 27.0 - 31.0 pg    MCHC 34.6 33.0 - 36.0 mg/dL    RDW 13.2 11.5 - 17.0 %    Platelet 224 130 - 400 x10(3)/mcL    MPV 10.4 9.4 - 12.4 fL    Neut % 54.3 %    Lymph % 36.6 %    Mono % 6.5 %    Eos % 2.2 %    Basophil % 0.2 %    Lymph # 1.85 0.6 - 4.6 x10(3)/mcL    Neut # 2.8 2.1 - 9.2 x10(3)/mcL    Mono # 0.33 0.1 - 1.3 x10(3)/mcL    Eos # 0.11 0 - 0.9 x10(3)/mcL    Baso # 0.01 0 - 0.2 x10(3)/mcL    IG# 0.01 0 - 0.0155 x10(3)/mcL    IG% 0.2 0 - 0.43 %    NRBC% 0.0 %       Assessment/Plan:     Problem List Items Addressed This Visit        GI    Chronic pancreatitis - Primary     Symptoms improved  Hospitalized at [facility] November 29, 2020 to December 7, 2020 for acute pancreatitis and abdominal pain.   Abdominal ultrasound was unremarkable and CBD measured 3 to 4 mm.   CT scan abdomen and pelvis with contrast identified an interval development of disorganized fluid adjacent to the proximal duodenum; nonspecific but may be suggestive of duodenitis, pancreatitis, sequela of duodenal ulcer with an element of constipation.   Initial laboratory results were unrevealing with exception of mildly elevated lipase (78).   Evaluated by surgery with no surgical intervention recommended.   Upper GI series was unremarkable.   Follow-up lipase was elevated and was treated with acute pancreatitis with IV hydration, bowel rest and pain control.   Pancreatitis was attributed to Depakote use and the medication was discontinued.   He was discharged home on Keppra 5 mg twice daily.  Hospitalized at [facility] December 9, 2020 to December 11, 2020 for acute pancreatitis and chronic diarrhea.   CT scan abdomen and pelvis with contrast revealed pancreatitis with increased pancreatic inflammation since November 29, 2020, 50 mm confluent area of fluid along the greater curvature of the stomach suggestive of  developing acute peripancreatic fluid collection.   Stool studies were negative, aside from fecal leukocytes which were positive.   Hospitalized at [facility] December 13, 2020 to December 15, 2020 for acute pancreatitis with reports of abdominal pain and diarrhea.   Reported being compliant with Keppra and off of valproic acid as previously directed.   Pancreatitis was attributed to continued tobacco use.  Abdominal ultrasound December 22, 2020 was unremarkable.   CT scan abdomen and pelvis with contrast December 25, 2020 revealed residual inflammatory changes and edema centered around the pancreatic body and tail, overall decreased from the prior exam, with decreased size of previously described pseudocyst.  Laboratory results March 19, 2021 revealed unremarkable CMP; triglycerides 115; hemoglobin 13.3, MPV 11.2, absolute neutrophils 2.02, and otherwise unremarkable CBC; IgG4 63.   He underwent EUS with Dr. Aquino August 13, 2021 with findings suggestive of mild to moderate chronic pancreatitis and 1 lymph node versus resolved fluid collection visualized and measured in the cira hepatis region, otherwise unremarkable.  Strongly advise tobacco cessation  Low fat diet, small and frequent meals  Call with updates  ER precautions provided  F/u clinic visit with NP in 1 year              Other    Tobacco user     Recommend tobacco cessation           Relevant Orders    Ambulatory referral/consult to Smoking Cessation Program

## 2022-07-05 NOTE — ASSESSMENT & PLAN NOTE
Symptoms improved  Hospitalized at [facility] November 29, 2020 to December 7, 2020 for acute pancreatitis and abdominal pain.   Abdominal ultrasound was unremarkable and CBD measured 3 to 4 mm.   CT scan abdomen and pelvis with contrast identified an interval development of disorganized fluid adjacent to the proximal duodenum; nonspecific but may be suggestive of duodenitis, pancreatitis, sequela of duodenal ulcer with an element of constipation.   Initial laboratory results were unrevealing with exception of mildly elevated lipase (78).   Evaluated by surgery with no surgical intervention recommended.   Upper GI series was unremarkable.   Follow-up lipase was elevated and was treated with acute pancreatitis with IV hydration, bowel rest and pain control.   Pancreatitis was attributed to Depakote use and the medication was discontinued.   He was discharged home on Keppra 5 mg twice daily.  Hospitalized at [facility] December 9, 2020 to December 11, 2020 for acute pancreatitis and chronic diarrhea.   CT scan abdomen and pelvis with contrast revealed pancreatitis with increased pancreatic inflammation since November 29, 2020, 50 mm confluent area of fluid along the greater curvature of the stomach suggestive of developing acute peripancreatic fluid collection.   Stool studies were negative, aside from fecal leukocytes which were positive.   Hospitalized at [facility] December 13, 2020 to December 15, 2020 for acute pancreatitis with reports of abdominal pain and diarrhea.   Reported being compliant with Keppra and off of valproic acid as previously directed.   Pancreatitis was attributed to continued tobacco use.  Abdominal ultrasound December 22, 2020 was unremarkable.   CT scan abdomen and pelvis with contrast December 25, 2020 revealed residual inflammatory changes and edema centered around the pancreatic body and tail, overall decreased from the prior exam, with decreased size of previously described pseudocyst.  Laboratory results  March 19, 2021 revealed unremarkable CMP; triglycerides 115; hemoglobin 13.3, MPV 11.2, absolute neutrophils 2.02, and otherwise unremarkable CBC; IgG4 63.   He underwent EUS with Dr. Aquino August 13, 2021 with findings suggestive of mild to moderate chronic pancreatitis and 1 lymph node versus resolved fluid collection visualized and measured in the cira hepatis region, otherwise unremarkable.  Strongly advise tobacco cessation  Low fat diet, small and frequent meals  Call with updates  ER precautions provided  F/u clinic visit with NP in 1 year

## 2022-07-30 ENCOUNTER — HOSPITAL ENCOUNTER (EMERGENCY)
Facility: HOSPITAL | Age: 32
Discharge: HOME | End: 2022-07-30
Attending: STUDENT IN AN ORGANIZED HEALTH CARE EDUCATION/TRAINING PROGRAM
Payer: COMMERCIAL

## 2022-07-30 VITALS
HEIGHT: 72 IN | TEMPERATURE: 98 F | RESPIRATION RATE: 18 BRPM | SYSTOLIC BLOOD PRESSURE: 134 MMHG | HEART RATE: 89 BPM | WEIGHT: 201.06 LBS | BODY MASS INDEX: 27.23 KG/M2 | OXYGEN SATURATION: 99 % | DIASTOLIC BLOOD PRESSURE: 90 MMHG

## 2022-07-30 DIAGNOSIS — T63.481A INSECT STINGS, ACCIDENTAL OR UNINTENTIONAL, INITIAL ENCOUNTER: Primary | ICD-10-CM

## 2022-07-30 PROCEDURE — 63600175 PHARM REV CODE 636 W HCPCS: Performed by: STUDENT IN AN ORGANIZED HEALTH CARE EDUCATION/TRAINING PROGRAM

## 2022-07-30 PROCEDURE — 96372 THER/PROPH/DIAG INJ SC/IM: CPT | Performed by: STUDENT IN AN ORGANIZED HEALTH CARE EDUCATION/TRAINING PROGRAM

## 2022-07-30 PROCEDURE — 99284 EMERGENCY DEPT VISIT MOD MDM: CPT | Mod: 25

## 2022-07-30 RX ORDER — DICLOFENAC SODIUM 75 MG/1
75 TABLET, DELAYED RELEASE ORAL 2 TIMES DAILY
Qty: 30 TABLET | Refills: 0 | Status: SHIPPED | OUTPATIENT
Start: 2022-07-30 | End: 2023-01-11 | Stop reason: ALTCHOICE

## 2022-07-30 RX ORDER — KETOROLAC TROMETHAMINE 30 MG/ML
15 INJECTION, SOLUTION INTRAMUSCULAR; INTRAVENOUS
Status: COMPLETED | OUTPATIENT
Start: 2022-07-30 | End: 2022-07-30

## 2022-07-30 RX ADMIN — KETOROLAC TROMETHAMINE 15 MG: 30 INJECTION, SOLUTION INTRAMUSCULAR; INTRAVENOUS at 04:07

## 2022-07-30 NOTE — ED PROVIDER NOTES
Encounter Date: 7/30/2022       History     Chief Complaint   Patient presents with    Insect Bite     Pt reports he was stung by a wasp on the 29th behind his right ear and now he has a headache and pain 8/10 with increased itching of the area. Pt is deaf and requires sign language interpretor.      32-year-old male presenting today with pain in his right neck after being stung by a bee.  He was stung by a bee yesterday and since then has had pain in his neck.  He is not taking anything for the pain at home.  He is resting comfortably in was sleeping when I walked in the room.  He denies any other symptoms at this time.    The history is provided by the patient. The history is limited by a language barrier. A  was used.   General Illness   The current episode started yesterday. The problem occurs continuously. The problem has been unchanged. Nothing relieves the symptoms. Nothing aggravates the symptoms. Pertinent negatives include no fever, no abdominal pain, no nausea, no vomiting, no congestion, no headaches, no cough, no shortness of breath, no discharge and no eye redness.     Review of patient's allergies indicates:  No Known Allergies  Past Medical History:   Diagnosis Date    Migraines     Seizures      Past Surgical History:   Procedure Laterality Date    INNER EAR SURGERY Right      Family History   Problem Relation Age of Onset    Hypertension Mother      Social History     Tobacco Use    Smoking status: Heavy Tobacco Smoker     Packs/day: 1.00    Smokeless tobacco: Never Used   Substance Use Topics    Alcohol use: Not Currently    Drug use: Never     Review of Systems   Constitutional: Negative for activity change, chills and fever.   HENT: Negative for congestion and facial swelling.    Eyes: Negative for discharge and redness.   Respiratory: Negative for cough and shortness of breath.    Cardiovascular: Negative for chest pain and leg swelling.   Gastrointestinal: Negative  for abdominal distention, abdominal pain, nausea and vomiting.   Genitourinary: Negative for dysuria and hematuria.   Musculoskeletal: Negative for gait problem and neck stiffness.   Skin: Negative for color change and pallor.   Neurological: Negative for dizziness and headaches.       Physical Exam     Initial Vitals [07/30/22 0351]   BP Pulse Resp Temp SpO2   (!) 134/90 89 18 98.4 °F (36.9 °C) 99 %      MAP       --         Physical Exam    Nursing note and vitals reviewed.  Constitutional: He appears well-developed. He is not diaphoretic. No distress.   HENT:   Head: Normocephalic.   Eyes: Right eye exhibits no discharge. Left eye exhibits no discharge.   Neck: Neck supple.   Normal range of motion.  Cardiovascular: Normal rate and regular rhythm.   Pulmonary/Chest: No respiratory distress. He has no wheezes.   Abdominal: Abdomen is soft. Bowel sounds are normal. He exhibits no distension. There is no abdominal tenderness. There is no rebound.   Musculoskeletal:         General: No tenderness. Normal range of motion.      Cervical back: Normal range of motion and neck supple.     Neurological: He is alert and oriented to person, place, and time. He has normal strength.   No focal deficits   Skin: Skin is warm and dry.         ED Course   Procedures  Labs Reviewed - No data to display       Imaging Results    None          Medications   ketorolac injection 15 mg (15 mg Intramuscular Given 7/30/22 0432)     Medical Decision Making:   ED Management:  Pt presents to the ED with complaints of pain after a bee sting    Evaluated patient in the emergency department.  He was stable well appearing.  He had no swelling or airway concerns at this time.  He was sleeping comfortably in the room.  He was given a shot of Toradol in the emergency department.  He had no deficits he improved after the Toradol.  He is being discharged home on Voltaren.  He is being discharged home in stable condition at this time.                         Clinical Impression:   Final diagnoses:  [T63.481A] Insect stings, accidental or unintentional, initial encounter (Primary)          ED Disposition Condition    Discharge Stable        ED Prescriptions     Medication Sig Dispense Start Date End Date Auth. Provider    diclofenac (VOLTAREN) 75 MG EC tablet Take 1 tablet (75 mg total) by mouth 2 (two) times daily. 30 tablet 7/30/2022  Rowdy Gross MD        Follow-up Information     Follow up With Specialties Details Why Contact Info    [facility] Internal Medicine Call in 1 day  [address] [facility] [id number] [phone]           Rowdy Gross MD  07/30/22 2906

## 2022-07-31 ENCOUNTER — HOSPITAL ENCOUNTER (EMERGENCY)
Facility: HOSPITAL | Age: 32
Discharge: HOME | End: 2022-07-31
Attending: INTERNAL MEDICINE
Payer: COMMERCIAL

## 2022-07-31 ENCOUNTER — HOSPITAL ENCOUNTER (EMERGENCY)
Facility: HOSPITAL | Age: 32
Discharge: HOME | End: 2022-07-31
Attending: EMERGENCY MEDICINE
Payer: COMMERCIAL

## 2022-07-31 VITALS
RESPIRATION RATE: 20 BRPM | HEART RATE: 88 BPM | OXYGEN SATURATION: 100 % | RESPIRATION RATE: 16 BRPM | DIASTOLIC BLOOD PRESSURE: 87 MMHG | SYSTOLIC BLOOD PRESSURE: 139 MMHG | TEMPERATURE: 98 F | HEIGHT: 67 IN | TEMPERATURE: 98 F | BODY MASS INDEX: 26.82 KG/M2 | OXYGEN SATURATION: 99 % | HEIGHT: 72 IN | WEIGHT: 198 LBS | WEIGHT: 198.44 LBS | DIASTOLIC BLOOD PRESSURE: 93 MMHG | SYSTOLIC BLOOD PRESSURE: 134 MMHG | HEART RATE: 95 BPM | BODY MASS INDEX: 31.15 KG/M2

## 2022-07-31 DIAGNOSIS — T63.461D WASP STING, ACCIDENTAL OR UNINTENTIONAL, SUBSEQUENT ENCOUNTER: Primary | ICD-10-CM

## 2022-07-31 DIAGNOSIS — R51.9 NONINTRACTABLE HEADACHE, UNSPECIFIED CHRONICITY PATTERN, UNSPECIFIED HEADACHE TYPE: Primary | ICD-10-CM

## 2022-07-31 LAB
ALBUMIN SERPL-MCNC: 3.7 GM/DL (ref 3.5–5)
ALBUMIN/GLOB SERPL: 1.3 RATIO (ref 1.1–2)
ALP SERPL-CCNC: 89 UNIT/L (ref 40–150)
ALT SERPL-CCNC: 26 UNIT/L (ref 0–55)
AST SERPL-CCNC: 26 UNIT/L (ref 5–34)
BASOPHILS # BLD AUTO: 0.01 X10(3)/MCL (ref 0–0.2)
BASOPHILS NFR BLD AUTO: 0.2 %
BILIRUBIN DIRECT+TOT PNL SERPL-MCNC: 0.6 MG/DL
BUN SERPL-MCNC: 4.4 MG/DL (ref 8.9–20.6)
CALCIUM SERPL-MCNC: 8.7 MG/DL (ref 8.4–10.2)
CHLORIDE SERPL-SCNC: 104 MMOL/L (ref 98–107)
CO2 SERPL-SCNC: 25 MMOL/L (ref 22–29)
CREAT SERPL-MCNC: 0.77 MG/DL (ref 0.73–1.18)
EOSINOPHIL # BLD AUTO: 0.18 X10(3)/MCL (ref 0–0.9)
EOSINOPHIL NFR BLD AUTO: 3.3 %
ERYTHROCYTE [DISTWIDTH] IN BLOOD BY AUTOMATED COUNT: 14.6 % (ref 11.5–17)
GLOBULIN SER-MCNC: 2.8 GM/DL (ref 2.4–3.5)
GLUCOSE SERPL-MCNC: 87 MG/DL (ref 74–100)
HCT VFR BLD AUTO: 35.5 % (ref 42–52)
HGB BLD-MCNC: 12.4 GM/DL (ref 14–18)
IMM GRANULOCYTES # BLD AUTO: 0.01 X10(3)/MCL (ref 0–0.04)
IMM GRANULOCYTES NFR BLD AUTO: 0.2 %
LYMPHOCYTES # BLD AUTO: 2.38 X10(3)/MCL (ref 0.6–4.6)
LYMPHOCYTES NFR BLD AUTO: 43.6 %
MCH RBC QN AUTO: 34.1 PG (ref 27–31)
MCHC RBC AUTO-ENTMCNC: 34.9 MG/DL (ref 33–36)
MCV RBC AUTO: 97.5 FL (ref 80–94)
MONOCYTES # BLD AUTO: 0.37 X10(3)/MCL (ref 0.1–1.3)
MONOCYTES NFR BLD AUTO: 6.8 %
NEUTROPHILS # BLD AUTO: 2.5 X10(3)/MCL (ref 2.1–9.2)
NEUTROPHILS NFR BLD AUTO: 45.9 %
NRBC BLD AUTO-RTO: 0 %
PLATELET # BLD AUTO: 208 X10(3)/MCL (ref 130–400)
PMV BLD AUTO: 10.4 FL (ref 7.4–10.4)
POTASSIUM SERPL-SCNC: 3.5 MMOL/L (ref 3.5–5.1)
PROT SERPL-MCNC: 6.5 GM/DL (ref 6.4–8.3)
RBC # BLD AUTO: 3.64 X10(6)/MCL (ref 4.7–6.1)
SODIUM SERPL-SCNC: 137 MMOL/L (ref 136–145)
WBC # SPEC AUTO: 5.5 X10(3)/MCL (ref 4.5–11.5)

## 2022-07-31 PROCEDURE — 85025 COMPLETE CBC W/AUTO DIFF WBC: CPT | Performed by: EMERGENCY MEDICINE

## 2022-07-31 PROCEDURE — 99285 EMERGENCY DEPT VISIT HI MDM: CPT | Mod: 25,27

## 2022-07-31 PROCEDURE — 99284 EMERGENCY DEPT VISIT MOD MDM: CPT | Mod: 25

## 2022-07-31 PROCEDURE — 25000003 PHARM REV CODE 250

## 2022-07-31 PROCEDURE — 63600175 PHARM REV CODE 636 W HCPCS: Performed by: EMERGENCY MEDICINE

## 2022-07-31 PROCEDURE — 63600175 PHARM REV CODE 636 W HCPCS

## 2022-07-31 PROCEDURE — 96372 THER/PROPH/DIAG INJ SC/IM: CPT

## 2022-07-31 PROCEDURE — 96374 THER/PROPH/DIAG INJ IV PUSH: CPT

## 2022-07-31 PROCEDURE — 36415 COLL VENOUS BLD VENIPUNCTURE: CPT | Performed by: EMERGENCY MEDICINE

## 2022-07-31 PROCEDURE — 80053 COMPREHEN METABOLIC PANEL: CPT | Performed by: EMERGENCY MEDICINE

## 2022-07-31 RX ORDER — BUTALBITAL, ACETAMINOPHEN AND CAFFEINE 50; 325; 40 MG/1; MG/1; MG/1
1 TABLET ORAL EVERY 6 HOURS PRN
Qty: 20 TABLET | Refills: 0 | Status: SHIPPED | OUTPATIENT
Start: 2022-07-31 | End: 2022-08-30

## 2022-07-31 RX ORDER — DROPERIDOL 2.5 MG/ML
1.25 INJECTION, SOLUTION INTRAMUSCULAR; INTRAVENOUS ONCE
Status: COMPLETED | OUTPATIENT
Start: 2022-07-31 | End: 2022-07-31

## 2022-07-31 RX ORDER — KETOROLAC TROMETHAMINE 30 MG/ML
30 INJECTION, SOLUTION INTRAMUSCULAR; INTRAVENOUS
Status: COMPLETED | OUTPATIENT
Start: 2022-07-31 | End: 2022-07-31

## 2022-07-31 RX ORDER — BUTALBITAL, ACETAMINOPHEN AND CAFFEINE 50; 325; 40 MG/1; MG/1; MG/1
2 TABLET ORAL
Status: COMPLETED | OUTPATIENT
Start: 2022-07-31 | End: 2022-07-31

## 2022-07-31 RX ORDER — BUTALBITAL, ACETAMINOPHEN AND CAFFEINE 50; 325; 40 MG/1; MG/1; MG/1
1 TABLET ORAL EVERY 4 HOURS PRN
Qty: 20 TABLET | Refills: 0 | Status: SHIPPED | OUTPATIENT
Start: 2022-07-31 | End: 2022-07-31 | Stop reason: SDUPTHER

## 2022-07-31 RX ADMIN — KETOROLAC TROMETHAMINE 30 MG: 30 INJECTION, SOLUTION INTRAMUSCULAR; INTRAVENOUS at 04:07

## 2022-07-31 RX ADMIN — DROPERIDOL 1.25 MG: 2.5 INJECTION, SOLUTION INTRAMUSCULAR; INTRAVENOUS at 05:07

## 2022-07-31 RX ADMIN — BUTALBITAL, ACETAMINOPHEN AND CAFFEINE 2 TABLET: 50; 325; 40 TABLET ORAL at 04:07

## 2022-07-31 NOTE — ED PROVIDER NOTES
Encounter Date: 7/31/2022       History     Chief Complaint   Patient presents with    Headache     PT DEAF, IN /[facility],  3RD ER VISIT W SAME CO RT SIDE PAIN NEAR COCHLEAR IMPLANT AFTER BEING STUNG BY WASP 2 DAYS AGO.  STATES IT HURTS AND MAKES HIM TIRED.  NO REDNESS NOTED TO AREA.   NO FEVER.  STATES NOT GIVEN ANYTHING FOR PAIN.      Pt is a 32 y.o male with a hx of migraine headaches, seizure presents today with headache after being stung on the scalp behind the right ear by a wasp earlier today. Pt has had a cochlear ear implant placed 16 years ago and is doing well with it. Pt denies chest pain, SOB, fever, chills, nausea and vomiting.    The history is provided by the patient and a parent. No  was used.     Review of patient's allergies indicates:  No Known Allergies  Past Medical History:   Diagnosis Date    Migraines     Seizures      Past Surgical History:   Procedure Laterality Date    INNER EAR SURGERY Right      Family History   Problem Relation Age of Onset    Hypertension Mother      Social History     Tobacco Use    Smoking status: Heavy Tobacco Smoker     Packs/day: 1.00    Smokeless tobacco: Never Used   Substance Use Topics    Alcohol use: Not Currently    Drug use: Never     Review of Systems   Constitutional: Positive for fatigue. Negative for activity change, chills, diaphoresis and fever.   HENT: Positive for ear pain. Negative for congestion, ear discharge, facial swelling, hearing loss, rhinorrhea, sinus pressure, sinus pain and trouble swallowing.    Eyes: Negative for photophobia, pain, discharge, redness, itching and visual disturbance.   Respiratory: Negative for cough, choking, chest tightness, shortness of breath and wheezing.    Cardiovascular: Negative for chest pain, palpitations and leg swelling.   Gastrointestinal: Negative for abdominal distention, abdominal pain, diarrhea, nausea and vomiting.   Musculoskeletal: Negative for arthralgias, back pain, joint  swelling, myalgias, neck pain and neck stiffness.   Neurological: Positive for headaches. Negative for tremors, syncope, facial asymmetry, weakness, light-headedness and numbness.   Psychiatric/Behavioral: Negative for confusion.   All other systems reviewed and are negative.      Physical Exam     Initial Vitals [07/31/22 1517]   BP Pulse Resp Temp SpO2   (!) 139/93 88 16 97.9 °F (36.6 °C) 100 %      MAP       --         Physical Exam    Nursing note and vitals reviewed.  Constitutional: He appears well-developed and well-nourished. He is not diaphoretic. No distress.   HENT:   Head: Normocephalic.   Right Ear: External ear normal.   Left Ear: External ear normal.   Approximately 3x4cm area of swollenness noted over the parietal region of the head above the right ear   Eyes: Pupils are equal, round, and reactive to light.   Neck: Neck supple. No thyromegaly present.   Cardiovascular: Normal rate, regular rhythm, normal heart sounds and intact distal pulses. Exam reveals no gallop and no friction rub.    Pulmonary/Chest: Breath sounds normal. No stridor. No respiratory distress. He has no wheezes. He has no rhonchi. He has no rales. He exhibits no tenderness.   Abdominal: Abdomen is soft. Bowel sounds are normal. He exhibits no distension. There is no abdominal tenderness. There is no rebound and no guarding.   Musculoskeletal:         General: No tenderness or edema. Normal range of motion.      Cervical back: Neck supple.     Lymphadenopathy:     He has no cervical adenopathy.   Neurological: He is alert and oriented to person, place, and time. No cranial nerve deficit or sensory deficit.   Skin: Skin is warm. Capillary refill takes less than 2 seconds. No rash noted.         ED Course   Procedures  Labs Reviewed - No data to display       Imaging Results    None          Medications   butalbital-acetaminophen-caffeine -40 mg per tablet 2 tablet (has no administration in time range)   ketorolac injection 30  mg (30 mg Intramuscular Given 7/31/22 7658)                Attending Attestation:   Physician Attestation Statement for Resident:  As the supervising MD   Physician Attestation Statement: I have personally seen and examined this patient.   I agree with the above history. -:   As the supervising MD I agree with the above PE.    As the supervising MD I agree with the above treatment, course, plan, and disposition.  I have reviewed the following: old records at this facility.            Attending ED Notes:   I performed a face to face evaluation of the patient Chun Fischer and my history reveal right temporal area wasp stung, presents with pain, Hx of deafness, stung was near the cochlear implant area the physical exam was remarkable for mild swelling with tenderness among insect stung area, no erythema.  I reviewed the history, physical exam and diagnostic studies performed by the resident Dr. Gold Webb and I concur with the diagnosis and assessment. This case was initially evaluated by Dr. Gold Webb  under my supervision and I agree with the documentation and plan.    Total teaching time: 12 min                 Clinical Impression:   Final diagnoses:  [T63.461D] Wasp sting, accidental or unintentional, subsequent encounter (Primary)                 Gonzalo Fernández MD  07/31/22 8717

## 2022-07-31 NOTE — ED PROVIDER NOTES
Encounter Date: 7/31/2022       History     Chief Complaint   Patient presents with    Headache     Patient complaint of painfull swelling to left back of head, with headache.  Symptoms x 2 days     Is a 32-year-old gentleman who is deaf he has a cochlear implant he says the foot in 16 or 17 years ago he has really had not had any problems with that he says that he has been having some pain over where his cochlear implant is over his right scalp he has not had any fever.  He has had some mild photophobia.  He also mentions that he has been tired at work and that work is getting harder and he feels sad because he has a hard time hearing people and does not like wearing his external device for his cochlear implant.  He has no nuchal rigidity or fever.  He says that he is somewhat sensitive to light and he does have a history of migraines and says that he thinks that this could be a migraine.  Patient has specialists in [place] to follow up with regarding his cochlear implant.  Previous note mentions having been stung by wasp on his scalp near the cochlear implant that he never mentioned this to me.  Patient says that he has a history of depression but is not suicidal.    History was obtained through video         Review of patient's allergies indicates:  No Known Allergies  Past Medical History:   Diagnosis Date    Migraines     Seizures      Past Surgical History:   Procedure Laterality Date    INNER EAR SURGERY Right      Family History   Problem Relation Age of Onset    Hypertension Mother      Social History     Tobacco Use    Smoking status: Heavy Tobacco Smoker     Packs/day: 1.00    Smokeless tobacco: Never Used   Substance Use Topics    Alcohol use: Not Currently    Drug use: Never     Review of Systems   Constitutional: Negative for chills and fever.   HENT: Negative for congestion and ear pain.         Patient describes pain and swelling over where his cochlearin his right scalp    Eyes: Negative for discharge.   Respiratory: Negative for cough, shortness of breath and wheezing.    Cardiovascular: Negative for chest pain and leg swelling.   Gastrointestinal: Negative for abdominal pain, constipation, diarrhea, nausea and vomiting.   Genitourinary: Negative for dysuria, flank pain and frequency.   Musculoskeletal: Negative for back pain and joint swelling.   Skin: Negative for rash.   Neurological: Negative for dizziness, weakness and headaches.   Psychiatric/Behavioral: Negative for agitation, confusion and hallucinations.       Physical Exam     Initial Vitals [07/31/22 0337]   BP Pulse Resp Temp SpO2   134/87 95 20 97.9 °F (36.6 °C) 99 %      MAP       --         Physical Exam    Constitutional: He appears well-developed and well-nourished. No distress.   HENT:   Head: Normocephalic and atraumatic.   Cochlear implant right scalp no obvious induration swelling or redness.  No increased warmth.   Eyes: Conjunctivae and EOM are normal. Pupils are equal, round, and reactive to light. Right eye exhibits no discharge. Left eye exhibits no discharge. No scleral icterus.   Neck: No tracheal deviation present.   Cardiovascular: Normal rate, regular rhythm, normal heart sounds and intact distal pulses.   No murmur heard.  Pulmonary/Chest: Breath sounds normal. No stridor. No respiratory distress. He has no wheezes. He has no rales.   Abdominal: Abdomen is soft. He exhibits no distension. There is no abdominal tenderness. There is no rebound and no guarding.   Musculoskeletal:         General: No tenderness or edema. Normal range of motion.     Neurological: He is alert and oriented to person, place, and time. He has normal strength and normal reflexes. No cranial nerve deficit.   Skin: Skin is warm and dry. No rash noted. No erythema. No pallor.   Psychiatric: He has a normal mood and affect. His behavior is normal. Judgment and thought content normal.         ED Course   Procedures  Labs Reviewed    COMPREHENSIVE METABOLIC PANEL - Abnormal; Notable for the following components:       Result Value    Blood Urea Nitrogen 4.4 (*)     All other components within normal limits   CBC WITH DIFFERENTIAL - Abnormal; Notable for the following components:    RBC 3.64 (*)     Hgb 12.4 (*)     Hct 35.5 (*)     MCV 97.5 (*)     MCH 34.1 (*)     All other components within normal limits   CBC W/ AUTO DIFFERENTIAL    Narrative:     The following orders were created for panel order CBC auto differential.  Procedure                               Abnormality         Status                     ---------                               -----------         ------                     CBC with Differential[[id number]]        Abnormal            Final result                 Please view results for these tests on the individual orders.          Imaging Results          CT Head Without Contrast (Preliminary result)  Result time 07/31/22 05:25:33    Preliminary result by Interface, Rad Results In (07/31/22 05:25:33)                 Narrative:    START OF REPORT:  Technique: CT of the head was performed without intravenous contrast with axial as well as coronal and sagittal images.    Comparison: Comparison is with study dated â2022-05-26 18:02:45â.    Dosage Information: Automated exposure control was utilized.    Clinical history: Stung by a wasp on the 29th behind his right ear and now he has a headache and pain 8/10 with increased itching of the area, hx of seizures and migraines, best images due to cochlear implant.    Findings:  Artifact: There is moderate artifact some of the images due to a right cochlear implant which decreases sensitivity and specificity of the study intracranial hemorrhage and subtle nondisplaced fractures. The streak artifacts also obscure the soft tissues of the right temporal area.  Hemorrhage: No acute intracranial hemorrhage is seen.  CSF spaces: The ventricles sulci and basal cisterns are within normal  limits.  Brain parenchyma: There is preservation of the grey white junction throughout.  Cerebellum: Unremarkable.  Sella and skull base: The sella appears to be within normal limits for age.  Intracranial calcifications: Incidental note is made of some pineal region calcification.  Calvarium: No acute linear or depressed skull fracture is seen.    Maxillofacial Structures:  Paranasal sinuses: The visualized paranasal sinuses appear clear with no mucoperiosteal thickening or air fluid levels identified.  Orbits: The orbits appear unremarkable.  Zygomatic arches: The zygomatic arches are intact and unremarkable.  Temporal bones and mastoids: The temporal bones and mastoids appear unremarkable.  TMJ: The mandibular condyles appear normally placed with respect to the mandibular fossa.  Nasal Bones: The nasal septum is midline. No displaced nasal bone fracture is seen.    Visualized upper cervical spine: The visualized cervical spine appears unremarkable.      Impression:  1. No acute intracranial process identified on this somewhat limited examination.                      Preliminary result by Gerard Henderson MD (07/31/22 05:25:33)                 Narrative:    START OF REPORT:  Technique: CT of the head was performed without intravenous contrast with axial as well as coronal and sagittal images.    Comparison: Comparison is with study dated â2022-05-26 18:02:45â.    Dosage Information: Automated exposure control was utilized.    Clinical history: Stung by a wasp on the 29th behind his right ear and now he has a headache and pain 8/10 with increased itching of the area, hx of seizures and migraines, best images due to cochlear implant.    Findings:  Artifact: There is moderate artifact some of the images due to a right cochlear implant which decreases sensitivity and specificity of the study intracranial hemorrhage and subtle nondisplaced fractures. The streak artifacts also obscure the soft tissues of the right  temporal area.  Hemorrhage: No acute intracranial hemorrhage is seen.  CSF spaces: The ventricles sulci and basal cisterns are within normal limits.  Brain parenchyma: There is preservation of the grey white junction throughout.  Cerebellum: Unremarkable.  Sella and skull base: The sella appears to be within normal limits for age.  Intracranial calcifications: Incidental note is made of some pineal region calcification.  Calvarium: No acute linear or depressed skull fracture is seen.    Maxillofacial Structures:  Paranasal sinuses: The visualized paranasal sinuses appear clear with no mucoperiosteal thickening or air fluid levels identified.  Orbits: The orbits appear unremarkable.  Zygomatic arches: The zygomatic arches are intact and unremarkable.  Temporal bones and mastoids: The temporal bones and mastoids appear unremarkable.  TMJ: The mandibular condyles appear normally placed with respect to the mandibular fossa.  Nasal Bones: The nasal septum is midline. No displaced nasal bone fracture is seen.    Visualized upper cervical spine: The visualized cervical spine appears unremarkable.      Impression:  1. No acute intracranial process identified on this somewhat limited examination.                                X-Rays:   Independently Interpreted Readings:   Other Readings:  CT negative    Medications   droperidoL injection 1.25 mg (1.25 mg Intravenous Given 7/31/22 0504)                          Clinical Impression:   Final diagnoses:  [R51.9] Nonintractable headache, unspecified chronicity pattern, unspecified headache type (Primary)          ED Disposition Condition    Discharge Stable        ED Prescriptions     Medication Sig Dispense Start Date End Date Auth. Provider    butalbital-acetaminophen-caffeine -40 mg (FIORICET, ESGIC) -40 mg per tablet Take 1 tablet by mouth every 4 (four) hours as needed for Pain. 20 tablet 7/31/2022 8/30/2022 Dillan Cm MD        Follow-up Information      Follow up With Specialties Details Why Contact Info    PCP  Call in 1 day  follow up with PCP ni 1-2 days           Dillan Cm MD  07/31/22 0600

## 2022-08-16 ENCOUNTER — HOSPITAL ENCOUNTER (EMERGENCY)
Facility: HOSPITAL | Age: 32
Discharge: HOME | End: 2022-08-16
Attending: FAMILY MEDICINE
Payer: COMMERCIAL

## 2022-08-16 VITALS
SYSTOLIC BLOOD PRESSURE: 146 MMHG | RESPIRATION RATE: 17 BRPM | HEART RATE: 68 BPM | WEIGHT: 206.13 LBS | HEIGHT: 67 IN | TEMPERATURE: 98 F | BODY MASS INDEX: 32.35 KG/M2 | DIASTOLIC BLOOD PRESSURE: 84 MMHG | OXYGEN SATURATION: 99 %

## 2022-08-16 DIAGNOSIS — R51.9 NONINTRACTABLE HEADACHE, UNSPECIFIED CHRONICITY PATTERN, UNSPECIFIED HEADACHE TYPE: Primary | ICD-10-CM

## 2022-08-16 DIAGNOSIS — R68.84 JAW PAIN: ICD-10-CM
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
## 2022-08-16 PROCEDURE — 96372 THER/PROPH/DIAG INJ SC/IM: CPT | Performed by: FAMILY MEDICINE

## 2022-08-16 PROCEDURE — 99285 EMERGENCY DEPT VISIT HI MDM: CPT | Mod: 25

## 2022-08-16 PROCEDURE — 63600175 PHARM REV CODE 636 W HCPCS: Performed by: FAMILY MEDICINE

## 2022-08-16 RX ORDER — MORPHINE SULFATE 2 MG/ML
4 INJECTION, SOLUTION INTRAMUSCULAR; INTRAVENOUS
Status: COMPLETED | OUTPATIENT
Start: 2022-08-16 | End: 2022-08-16

## 2022-08-16 RX ADMIN — MORPHINE SULFATE 4 MG: 2 INJECTION, SOLUTION INTRAMUSCULAR; INTRAVENOUS at 06:08
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
## 2022-08-16 NOTE — ED NOTES
Pt states ride waiting on him, that he would like to leave. Informed pt MD is reviewing CT results and will come discharge him. Pt verbalized understanding. No acute distress noted, pt ambulating with steady gait. All vital signs stable.   Dr. Sinha notified pt is awaiting discharge.
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
## 2022-08-20 ENCOUNTER — HOSPITAL ENCOUNTER (EMERGENCY)
Facility: HOSPITAL | Age: 32
Discharge: HOME | End: 2022-08-20
Attending: INTERNAL MEDICINE
Payer: COMMERCIAL

## 2022-08-20 VITALS
TEMPERATURE: 98 F | BODY MASS INDEX: 27.41 KG/M2 | SYSTOLIC BLOOD PRESSURE: 136 MMHG | HEIGHT: 72 IN | OXYGEN SATURATION: 99 % | HEART RATE: 94 BPM | DIASTOLIC BLOOD PRESSURE: 89 MMHG | RESPIRATION RATE: 20 BRPM | WEIGHT: 202.38 LBS

## 2022-08-20 DIAGNOSIS — R53.1 WEAKNESS: Primary | ICD-10-CM

## 2022-08-20 LAB
ALBUMIN SERPL-MCNC: 3.8 GM/DL (ref 3.5–5)
ALBUMIN/GLOB SERPL: 1.2 RATIO (ref 1.1–2)
ALP SERPL-CCNC: 99 UNIT/L (ref 40–150)
ALT SERPL-CCNC: 24 UNIT/L (ref 0–55)
AST SERPL-CCNC: 25 UNIT/L (ref 5–34)
BASOPHILS # BLD AUTO: 0.01 X10(3)/MCL (ref 0–0.2)
BASOPHILS NFR BLD AUTO: 0.2 %
BILIRUBIN DIRECT+TOT PNL SERPL-MCNC: 0.4 MG/DL
BUN SERPL-MCNC: <3 MG/DL (ref 8.9–20.6)
CALCIUM SERPL-MCNC: 9.4 MG/DL (ref 8.4–10.2)
CHLORIDE SERPL-SCNC: 103 MMOL/L (ref 98–107)
CO2 SERPL-SCNC: 25 MMOL/L (ref 22–29)
CREAT SERPL-MCNC: 0.84 MG/DL (ref 0.73–1.18)
EOSINOPHIL # BLD AUTO: 0.1 X10(3)/MCL (ref 0–0.9)
EOSINOPHIL NFR BLD AUTO: 2.1 %
ERYTHROCYTE [DISTWIDTH] IN BLOOD BY AUTOMATED COUNT: 13.9 % (ref 11.5–17)
GFR SERPLBLD CREATININE-BSD FMLA CKD-EPI: >60 MLS/MIN/1.73/M2
GLOBULIN SER-MCNC: 3.2 GM/DL (ref 2.4–3.5)
GLUCOSE SERPL-MCNC: 102 MG/DL (ref 74–100)
HCT VFR BLD AUTO: 37.3 % (ref 42–52)
HGB BLD-MCNC: 12.9 GM/DL (ref 14–18)
IMM GRANULOCYTES # BLD AUTO: 0.01 X10(3)/MCL (ref 0–0.04)
IMM GRANULOCYTES NFR BLD AUTO: 0.2 %
LYMPHOCYTES # BLD AUTO: 2.11 X10(3)/MCL (ref 0.6–4.6)
LYMPHOCYTES NFR BLD AUTO: 43.9 %
MCH RBC QN AUTO: 33.8 PG (ref 27–31)
MCHC RBC AUTO-ENTMCNC: 34.6 MG/DL (ref 33–36)
MCV RBC AUTO: 97.6 FL (ref 80–94)
MONOCYTES # BLD AUTO: 0.28 X10(3)/MCL (ref 0.1–1.3)
MONOCYTES NFR BLD AUTO: 5.8 %
NEUTROPHILS # BLD AUTO: 2.3 X10(3)/MCL (ref 2.1–9.2)
NEUTROPHILS NFR BLD AUTO: 47.8 %
NRBC BLD AUTO-RTO: 0 %
PLATELET # BLD AUTO: 251 X10(3)/MCL (ref 130–400)
PMV BLD AUTO: 10.3 FL (ref 7.4–10.4)
POCT GLUCOSE: 102 MG/DL (ref 70–110)
POTASSIUM SERPL-SCNC: 3.6 MMOL/L (ref 3.5–5.1)
PROT SERPL-MCNC: 7 GM/DL (ref 6.4–8.3)
RBC # BLD AUTO: 3.82 X10(6)/MCL (ref 4.7–6.1)
SARS-COV-2 RDRP RESP QL NAA+PROBE: NEGATIVE
SODIUM SERPL-SCNC: 138 MMOL/L (ref 136–145)
TSH SERPL-ACNC: 0.93 UIU/ML (ref 0.35–4.94)
WBC # SPEC AUTO: 4.8 X10(3)/MCL (ref 4.5–11.5)

## 2022-08-20 PROCEDURE — 82962 GLUCOSE BLOOD TEST: CPT

## 2022-08-20 PROCEDURE — 80053 COMPREHEN METABOLIC PANEL: CPT | Performed by: INTERNAL MEDICINE

## 2022-08-20 PROCEDURE — 99283 EMERGENCY DEPT VISIT LOW MDM: CPT | Mod: 25

## 2022-08-20 PROCEDURE — 84443 ASSAY THYROID STIM HORMONE: CPT | Performed by: INTERNAL MEDICINE

## 2022-08-20 PROCEDURE — 36415 COLL VENOUS BLD VENIPUNCTURE: CPT | Performed by: INTERNAL MEDICINE

## 2022-08-20 PROCEDURE — 85025 COMPLETE CBC W/AUTO DIFF WBC: CPT | Performed by: INTERNAL MEDICINE

## 2022-08-20 PROCEDURE — 87635 SARS-COV-2 COVID-19 AMP PRB: CPT | Performed by: INTERNAL MEDICINE

## 2022-08-20 NOTE — ED PROVIDER NOTES
Encounter Date: 8/20/2022       History     Chief Complaint   Patient presents with    Dizziness    Weakness     C/o dizziness, fatigue x 2 days..    Generalized Body Aches     Presents with weakness, states woke up today feeling weak.    The history is provided by the patient. The history is limited by a language barrier. A  was used.     Review of patient's allergies indicates:  No Known Allergies  Past Medical History:   Diagnosis Date    Migraines     Seizures      Past Surgical History:   Procedure Laterality Date    INNER EAR SURGERY Right      Family History   Problem Relation Age of Onset    Hypertension Mother      Social History     Tobacco Use    Smoking status: Heavy Tobacco Smoker     Packs/day: 1.00    Smokeless tobacco: Never Used   Substance Use Topics    Alcohol use: Not Currently    Drug use: Never     Review of Systems   Constitutional: Positive for fatigue. Negative for chills, fever and unexpected weight change.   Respiratory: Negative for cough and shortness of breath.    Cardiovascular: Negative for chest pain.   Gastrointestinal: Negative for diarrhea, nausea and vomiting.   Genitourinary: Negative for difficulty urinating.   Musculoskeletal: Negative for gait problem and myalgias.   Skin: Negative for rash.   Neurological: Positive for weakness. Negative for dizziness and syncope.   Psychiatric/Behavioral: Negative for confusion.   All other systems reviewed and are negative.      Physical Exam     Initial Vitals [08/20/22 1705]   BP Pulse Resp Temp SpO2   136/89 94 20 97.5 °F (36.4 °C) 99 %      MAP       --         Physical Exam    Nursing note and vitals reviewed.  Constitutional: He appears well-developed and well-nourished. No distress.   HENT:   Head: Normocephalic and atraumatic.   Eyes: Conjunctivae and EOM are normal. Pupils are equal, round, and reactive to light.   Neck: Neck supple.   Normal range of motion.  Cardiovascular: Normal rate, regular  rhythm, normal heart sounds and intact distal pulses.   Pulmonary/Chest: Breath sounds normal. No respiratory distress.   Abdominal: Abdomen is soft. Bowel sounds are normal. He exhibits no distension. There is no abdominal tenderness. There is no rebound and no guarding.   Musculoskeletal:         General: No edema. Normal range of motion.      Cervical back: Normal range of motion and neck supple.     Neurological: He is alert and oriented to person, place, and time. He has normal strength. GCS score is 15. GCS eye subscore is 4. GCS verbal subscore is 5. GCS motor subscore is 6.   Skin: Skin is warm and dry. No rash noted.   Psychiatric: His behavior is normal.         ED Course   Procedures  Labs Reviewed   COMPREHENSIVE METABOLIC PANEL - Abnormal; Notable for the following components:       Result Value    Glucose Level 102 (*)     Blood Urea Nitrogen <3.0 (*)     All other components within normal limits   CBC WITH DIFFERENTIAL - Abnormal; Notable for the following components:    RBC 3.82 (*)     Hgb 12.9 (*)     Hct 37.3 (*)     MCV 97.6 (*)     MCH 33.8 (*)     All other components within normal limits   TSH - Normal   SARS-COV-2 RNA AMPLIFICATION, QUAL - Normal   CBC W/ AUTO DIFFERENTIAL    Narrative:     The following orders were created for panel order CBC auto differential.  Procedure                               Abnormality         Status                     ---------                               -----------         ------                     CBC with Differential[[id number]]        Abnormal            Final result                 Please view results for these tests on the individual orders.   EXTRA TUBES    Narrative:     The following orders were created for panel order EXTRA TUBES.  Procedure                               Abnormality         Status                     ---------                               -----------         ------                     Light Blue Top Hold[[id number]]                                                            Please view results for these tests on the individual orders.   LIGHT BLUE TOP HOLD   POCT GLUCOSE   POCT GLUCOSE MONITORING CONTINUOUS          Imaging Results    None          Medications - No data to display                       Clinical Impression:   Final diagnoses:  [R53.1] Weakness (Primary)          ED Disposition Condition    Discharge Stable        ED Prescriptions     None        Follow-up Information    None          Gonzalo Fernández MD  08/20/22 2019

## 2022-10-21 ENCOUNTER — HOSPITAL ENCOUNTER (EMERGENCY)
Facility: HOSPITAL | Age: 32
Discharge: HOME | End: 2022-10-22
Attending: FAMILY MEDICINE
Payer: COMMERCIAL

## 2022-10-21 VITALS
HEIGHT: 69 IN | WEIGHT: 191.81 LBS | SYSTOLIC BLOOD PRESSURE: 130 MMHG | TEMPERATURE: 97 F | BODY MASS INDEX: 28.41 KG/M2 | OXYGEN SATURATION: 100 % | RESPIRATION RATE: 16 BRPM | HEART RATE: 79 BPM | DIASTOLIC BLOOD PRESSURE: 90 MMHG

## 2022-10-21 DIAGNOSIS — H57.13 EYE PAIN, BILATERAL: ICD-10-CM

## 2022-10-21 DIAGNOSIS — H10.33 ACUTE CONJUNCTIVITIS OF BOTH EYES, UNSPECIFIED ACUTE CONJUNCTIVITIS TYPE: Primary | ICD-10-CM

## 2022-10-21 DIAGNOSIS — R51.9 NONINTRACTABLE HEADACHE, UNSPECIFIED CHRONICITY PATTERN, UNSPECIFIED HEADACHE TYPE: ICD-10-CM

## 2022-10-21 LAB
FLUAV AG UPPER RESP QL IA.RAPID: NOT DETECTED
FLUBV AG UPPER RESP QL IA.RAPID: NOT DETECTED
SARS-COV-2 RNA RESP QL NAA+PROBE: NOT DETECTED

## 2022-10-21 PROCEDURE — 0241U COVID/FLU A&B PCR: CPT | Performed by: PHYSICIAN ASSISTANT

## 2022-10-21 PROCEDURE — 99284 EMERGENCY DEPT VISIT MOD MDM: CPT | Mod: 25

## 2022-10-21 RX ORDER — KETOROLAC TROMETHAMINE 30 MG/ML
30 INJECTION, SOLUTION INTRAMUSCULAR; INTRAVENOUS
Status: COMPLETED | OUTPATIENT
Start: 2022-10-21 | End: 2022-10-22

## 2022-10-21 NOTE — Clinical Note
"Chun Patel" Amado was seen and treated in our emergency department on 10/21/2022.  He may return to work on 10/23/2022.       If you have any questions or concerns, please don't hesitate to call.      Antoine Wiggins RN    "

## 2022-10-22 LAB — STREP A PCR (OHS): NOT DETECTED

## 2022-10-22 PROCEDURE — 63600175 PHARM REV CODE 636 W HCPCS: Performed by: PHYSICIAN ASSISTANT

## 2022-10-22 PROCEDURE — 96372 THER/PROPH/DIAG INJ SC/IM: CPT | Performed by: PHYSICIAN ASSISTANT

## 2022-10-22 PROCEDURE — 87651 STREP A DNA AMP PROBE: CPT | Performed by: PHYSICIAN ASSISTANT

## 2022-10-22 RX ORDER — ERYTHROMYCIN 5 MG/G
OINTMENT OPHTHALMIC EVERY 4 HOURS
Qty: 3.5 G | Refills: 0 | Status: SHIPPED | OUTPATIENT
Start: 2022-10-22 | End: 2022-11-01

## 2022-10-22 RX ORDER — DICLOFENAC SODIUM 75 MG/1
75 TABLET, DELAYED RELEASE ORAL 2 TIMES DAILY PRN
Qty: 15 TABLET | Refills: 0 | Status: SHIPPED | OUTPATIENT
Start: 2022-10-22 | End: 2022-12-15 | Stop reason: ALTCHOICE

## 2022-10-22 RX ADMIN — KETOROLAC TROMETHAMINE 30 MG: 30 INJECTION, SOLUTION INTRAMUSCULAR at 12:10

## 2022-10-22 NOTE — DISCHARGE INSTRUCTIONS
Report to Emergency Department if symptoms return or worsen; [facility] Within 1 to 2 days, It is important that you follow up with your primary care provider or specialist if indicated for further evaluation, workup, and treatment as necessary. The exam and treatment you received in Emergency Department was for an urgent problem and NOT INTENDED AS COMPLETE CARE. It is important that you FOLLOW UP with a doctor for ongoing care. If your symptoms become WORSE or you DO NOT IMPROVE and you are unable to reach your health care provider, you should RETURN to the Emergency Department. The Emergency Department provider has provided a PRELIMINARY INTERPRETATION of all your studies. A final interpretation may be done after you are discharged. If a change in your diagnosis or treatment is needed WE WILL CONTACT YOU. It is critical that we have a CURRENT PHONE NUMBER FOR YOU.

## 2022-10-22 NOTE — ED PROVIDER NOTES
Encounter Date: 10/21/2022       History     Chief Complaint   Patient presents with    Cough    Eye Problem     Cough and BEATRIZ eye redness/irritation x 1 day     31 yo M w/ PMHx significant for migraines, smoking & deafness presents to ED c/o 1 day hx of cough, sore throat HA & fatigue. Patient also reports upon waking up tonight approx 1 hour PTA he was experiencing pain & redness in b/l eyes w/ associated blurred vision & photophobia. Patient describes pain as throbbing in nature. Denies chemical exposure or getting anything in eyes. Denies eye discharge, diplopia or blindness. Denies hemoptysis, wheezing, SOB, congestion, rhinorrhea, dysphagia, stridor, drooling, trismus, neck stiffness, AMS, confusion, vertigo, facial drooping, slurred speech, focal weakness, paralysis, paresthesia, numbness, ataxia, abnormal balance, F/C. VSS on arrival w/ NAD.    Review of patient's allergies indicates:  No Known Allergies  Past Medical History:   Diagnosis Date    Migraines     Seizures      Past Surgical History:   Procedure Laterality Date    INNER EAR SURGERY Right      Family History   Problem Relation Age of Onset    Hypertension Mother      Social History     Tobacco Use    Smoking status: Heavy Smoker     Packs/day: 1.00     Types: Cigarettes    Smokeless tobacco: Never   Substance Use Topics    Alcohol use: Not Currently    Drug use: Never     Review of Systems   Constitutional:  Positive for fatigue. Negative for chills and fever.   HENT:  Positive for sore throat. Negative for congestion, ear discharge, ear pain, rhinorrhea, tinnitus and trouble swallowing.    Eyes:  Positive for photophobia, pain, redness, itching and visual disturbance. Negative for discharge.   Respiratory:  Positive for cough. Negative for shortness of breath.    Cardiovascular:  Negative for chest pain and palpitations.   Gastrointestinal:  Negative for abdominal pain, diarrhea, nausea and vomiting.   Genitourinary:  Negative for dysuria,  genital sores, hematuria, penile discharge, scrotal swelling and testicular pain.   Musculoskeletal:  Negative for arthralgias, back pain, joint swelling, myalgias, neck pain and neck stiffness.   Skin:  Negative for color change, pallor and rash.   Allergic/Immunologic: Negative for immunocompromised state.   Neurological:  Positive for headaches. Negative for dizziness, seizures, syncope, facial asymmetry, speech difficulty, weakness, light-headedness and numbness.   Hematological:  Negative for adenopathy.   Psychiatric/Behavioral:  Negative for confusion.    All other systems reviewed and are negative.    Physical Exam     Initial Vitals [10/21/22 2213]   BP Pulse Resp Temp SpO2   (!) 130/90 79 16 96.8 °F (36 °C) 100 %      MAP       --         Physical Exam    Nursing note and vitals reviewed.  Constitutional: He appears well-developed and well-nourished. He is not diaphoretic. No distress.   HENT:   Head: Normocephalic and atraumatic.   Nose: Nose normal. No mucosal edema or rhinorrhea. Right sinus exhibits no maxillary sinus tenderness and no frontal sinus tenderness. Left sinus exhibits no maxillary sinus tenderness and no frontal sinus tenderness.   Mouth/Throat: Uvula is midline and mucous membranes are normal. No trismus in the jaw. No dental abscesses or uvula swelling. Posterior oropharyngeal edema and posterior oropharyngeal erythema present. No oropharyngeal exudate or tonsillar abscesses.   Eyes: EOM and lids are normal. Pupils are equal, round, and reactive to light. Right eye exhibits no discharge. Left eye exhibits no discharge. Right conjunctiva is injected. Right conjunctiva has no hemorrhage. Left conjunctiva is injected. Left conjunctiva has no hemorrhage.   IOP: L - 12 mmHg, R - 14 mmHg    Visual acuity: 20/40 bilaterally   Neck: Neck supple. No Brudzinski's sign and no Kernig's sign noted.   Normal range of motion.   Full passive range of motion without pain.     Cardiovascular:  Normal  rate, regular rhythm, normal heart sounds and intact distal pulses.     Exam reveals no gallop and no friction rub.       No murmur heard.  Pulmonary/Chest: Breath sounds normal. No stridor. No respiratory distress. He has no wheezes. He has no rhonchi. He has no rales.   Abdominal: Abdomen is soft. Bowel sounds are normal. He exhibits no distension. There is no abdominal tenderness. There is no rebound and no guarding.   Musculoskeletal:         General: No tenderness or edema. Normal range of motion.      Cervical back: Full passive range of motion without pain, normal range of motion and neck supple. No rigidity. No spinous process tenderness or muscular tenderness. Normal range of motion.     Lymphadenopathy:     He has no cervical adenopathy.   Neurological: He is alert and oriented to person, place, and time. He has normal strength. He is not disoriented. He displays no tremor. No cranial nerve deficit or sensory deficit. He exhibits normal muscle tone. He displays a negative Romberg sign. He displays no seizure activity. Coordination and gait normal.   Skin: Skin is warm and dry. Capillary refill takes less than 2 seconds. No erythema. No pallor.   Psychiatric: He has a normal mood and affect.       ED Course   Procedures  Labs Reviewed   COVID/FLU A&B PCR - Normal    Narrative:     The Xpert Xpress SARS-CoV-2/FLU/RSV plus is a rapid, multiplexed real-time PCR test intended for the simultaneous qualitative detection and differentiation of SARS-CoV-2, Influenza A, Influenza B, and respiratory syncytial virus (RSV) viral RNA in either nasopharyngeal swab or nasal swab specimens.         STREP GROUP A BY PCR - Normal    Narrative:     The Xpert Xpress Strep A test is a rapid, qualitative in vitro diagnostic test for the detection of Streptococcus pyogenes (Group A ß-hemolytic Streptococcus, Strep A) in throat swab specimens from patients with signs and symptoms of pharyngitis.            Imaging Results     None          Medications   ketorolac injection 30 mg (30 mg Intramuscular Given 10/22/22 0004)     Medical Decision Making:   Clinical Tests:   Lab Tests: Ordered and Reviewed  IOP wnl b/l & visual acuity intact. Will discharge w/ erythromycin ophthalmic ointment to provide comfort & cover any possible bacterial infection. All swabs negative. Patient has benign abdominal exam w/o neuro deficits. Will discharge w/ NSAID for HA. Will refer to IM clinic to establish PCP. ED precautions given.                        Clinical Impression:   Final diagnoses:  [H57.13] Eye pain, bilateral  [H10.33] Acute conjunctivitis of both eyes, unspecified acute conjunctivitis type (Primary)  [R51.9] Nonintractable headache, unspecified chronicity pattern, unspecified headache type      ED Disposition Condition    Discharge Stable          ED Prescriptions       Medication Sig Dispense Start Date End Date Auth. Provider    diclofenac (VOLTAREN) 75 MG EC tablet Take 1 tablet (75 mg total) by mouth 2 (two) times daily as needed (pain). 15 tablet 10/22/2022 -- CHANDU Ferrer    erythromycin (ROMYCIN) ophthalmic ointment Place into both eyes every 4 (four) hours. Place a 1/2 inch ribbon of ointment into the lower eyelid. for 10 days 3.5 g 10/22/2022 11/1/2022 CHANDU Ferrer          Follow-up Information       Follow up With Specialties Details Why Contact Info Additional Information    [facility] Internal Medicine In 2 weeks  [address] [facility] [address] [phone] Internal Medicine Clinic Entrance #1    [facility] Emergency [facility] Emergency Medicine  If symptoms worsen [address] [facility] [address] [phone]              CHANDU Ferrer  10/22/22 0051

## 2022-11-04 ENCOUNTER — HOSPITAL ENCOUNTER (EMERGENCY)
Facility: HOSPITAL | Age: 32
Discharge: HOME | End: 2022-11-04
Attending: INTERNAL MEDICINE
Payer: COMMERCIAL

## 2022-11-04 VITALS
BODY MASS INDEX: 28.43 KG/M2 | HEIGHT: 69 IN | WEIGHT: 191.94 LBS | TEMPERATURE: 98 F | OXYGEN SATURATION: 98 % | DIASTOLIC BLOOD PRESSURE: 75 MMHG | RESPIRATION RATE: 18 BRPM | HEART RATE: 68 BPM | SYSTOLIC BLOOD PRESSURE: 116 MMHG

## 2022-11-04 DIAGNOSIS — J06.9 UPPER RESPIRATORY TRACT INFECTION, UNSPECIFIED TYPE: Primary | ICD-10-CM

## 2022-11-04 DIAGNOSIS — R05.9 COUGH: ICD-10-CM

## 2022-11-04 DIAGNOSIS — R07.9 CHEST PAIN: ICD-10-CM

## 2022-11-04 PROCEDURE — 99285 EMERGENCY DEPT VISIT HI MDM: CPT | Mod: 25

## 2022-11-04 PROCEDURE — 93005 ELECTROCARDIOGRAM TRACING: CPT

## 2022-11-04 PROCEDURE — 0241U COVID/FLU A&B PCR: CPT | Performed by: INTERNAL MEDICINE

## 2022-11-04 PROCEDURE — 25000003 PHARM REV CODE 250: Performed by: INTERNAL MEDICINE

## 2022-11-04 RX ORDER — GUAIFENESIN 100 MG/5ML
400 SOLUTION ORAL ONCE
Status: COMPLETED | OUTPATIENT
Start: 2022-11-04 | End: 2022-11-04

## 2022-11-04 RX ORDER — BENZONATATE 200 MG/1
200 CAPSULE ORAL 3 TIMES DAILY PRN
Qty: 30 CAPSULE | Refills: 0 | Status: SHIPPED | OUTPATIENT
Start: 2022-11-04 | End: 2022-11-14

## 2022-11-04 RX ORDER — GUAIFENESIN 100 MG/5ML
200 SOLUTION ORAL EVERY 4 HOURS PRN
Qty: 236 ML | Refills: 0 | Status: SHIPPED | OUTPATIENT
Start: 2022-11-04 | End: 2022-11-14

## 2022-11-04 RX ADMIN — GUAIFENESIN 400 MG: 100 SOLUTION ORAL at 03:11

## 2022-11-04 NOTE — Clinical Note
"Chun Patel" Amado was seen and treated in our emergency department on 11/4/2022.  He may return to work on 11/05/2022.       If you have any questions or concerns, please don't hesitate to call.      Rowdy May RN    "

## 2022-11-04 NOTE — ED PROVIDER NOTES
Encounter Date: 11/4/2022       History     Chief Complaint   Patient presents with    Chest Pain    Cough    Weakness     Chest pain since 10pm last night.       Presents with cough and congestion today. Unsure if sick contacts.     The history is provided by the patient. No  was used.   Review of patient's allergies indicates:  No Known Allergies  Past Medical History:   Diagnosis Date    Migraines     Seizures      Past Surgical History:   Procedure Laterality Date    INNER EAR SURGERY Right      Family History   Problem Relation Age of Onset    Hypertension Mother      Social History     Tobacco Use    Smoking status: Heavy Smoker     Packs/day: 1.00     Types: Cigarettes    Smokeless tobacco: Never   Substance Use Topics    Alcohol use: Not Currently    Drug use: Never     Review of Systems   Constitutional:  Negative for fever.   HENT:  Positive for congestion. Negative for sore throat.    Eyes:  Positive for pain.   Respiratory:  Positive for cough. Negative for shortness of breath.    Cardiovascular:  Positive for chest pain.   Gastrointestinal:  Negative for nausea.   Genitourinary:  Negative for dysuria.   Musculoskeletal:  Negative for back pain.   Skin:  Negative for rash.   Neurological:  Negative for weakness.   Hematological:  Does not bruise/bleed easily.   All other systems reviewed and are negative.    Physical Exam     Initial Vitals [11/04/22 0049]   BP Pulse Resp Temp SpO2   127/84 83 18 98.2 °F (36.8 °C) 99 %      MAP       --         Physical Exam    Nursing note and vitals reviewed.  Constitutional: He appears well-developed and well-nourished.   HENT:   Head: Normocephalic and atraumatic.   Mouth/Throat: Oropharynx is clear and moist. No oropharyngeal exudate.   Cochlear implants in place   Eyes: Conjunctivae and EOM are normal. Pupils are equal, round, and reactive to light.   Neck: Neck supple.   Normal range of motion.  Cardiovascular:  Regular rhythm, normal heart  sounds and intact distal pulses.           Pulmonary/Chest: Breath sounds normal. No respiratory distress.   Abdominal: Abdomen is soft. Bowel sounds are normal. He exhibits no distension. There is no abdominal tenderness. There is no rebound and no guarding.   Musculoskeletal:         General: No edema. Normal range of motion.      Cervical back: Normal range of motion and neck supple.     Neurological: He is alert and oriented to person, place, and time. He has normal strength. GCS score is 15. GCS eye subscore is 4. GCS verbal subscore is 5. GCS motor subscore is 6.   Skin: Skin is warm and dry. No rash noted.   Psychiatric: His behavior is normal.       ED Course   Procedures  Labs Reviewed   COVID/FLU A&B PCR - Normal    Narrative:     The Xpert Xpress SARS-CoV-2/FLU/RSV plus is a rapid, multiplexed real-time PCR test intended for the simultaneous qualitative detection and differentiation of SARS-CoV-2, Influenza A, Influenza B, and respiratory syncytial virus (RSV) viral RNA in either nasopharyngeal swab or nasal swab specimens.           EKG Readings: (Independently Interpreted)   Initial Reading: No STEMI. Rhythm: Normal Sinus Rhythm. Heart Rate: 72. Ectopy: No Ectopy. Conduction: Normal. ST Segments: Normal ST Segments. T Waves: Normal. Clinical Impression: Normal Sinus Rhythm     Imaging Results              X-Ray Chest PA And Lateral (Preliminary result)  Result time 11/04/22 01:41:37      ED Interpretation by Gonzalo Fernández MD (11/04/22 01:41:37, [facility], Emergency Medicine)    Negative                                  X-Rays:   Independently Interpreted Readings:   Chest X-Ray: Normal heart size.  No infiltrates.  No acute abnormalities.   Medications   guaiFENesin 100 mg/5 ml syrup 400 mg (has no administration in time range)                              Clinical Impression:   Final diagnoses:  [R05.9] Cough  [R07.9] Chest pain  [J06.9] Upper respiratory tract  infection, unspecified type (Primary)        ED Disposition Condition    Discharge Stable          ED Prescriptions       Medication Sig Dispense Start Date End Date Auth. Provider    benzonatate (TESSALON) 200 MG capsule Take 1 capsule (200 mg total) by mouth 3 (three) times daily as needed for Cough. 30 capsule 11/4/2022 11/14/2022 Gonzalo Fernández MD    guaiFENesin 100 mg/5 ml (ROBITUSSIN) 100 mg/5 mL syrup Take 10 mLs (200 mg total) by mouth every 4 (four) hours as needed for Cough. 236 mL 11/4/2022 11/14/2022 Gonzalo Fernández MD          Follow-up Information       Follow up With Specialties Details Why Contact Info    [facility] Emergency Medicine  If symptoms worsen [address] [place] [address] [phone]    [facility]  Schedule an appointment as soon as possible for a visit in 3 months  [facility] [address]             Gonzalo Fernández MD  11/04/22 0243

## 2022-11-20 ENCOUNTER — HOSPITAL ENCOUNTER (EMERGENCY)
Facility: HOSPITAL | Age: 32
Discharge: HOME | End: 2022-11-21
Attending: EMERGENCY MEDICINE
Payer: COMMERCIAL

## 2022-11-20 DIAGNOSIS — J11.1 INFLUENZA: Primary | ICD-10-CM

## 2022-11-20 PROCEDURE — 0240U COVID/FLU A&B PCR: CPT | Performed by: NURSE PRACTITIONER

## 2022-11-20 PROCEDURE — 99284 EMERGENCY DEPT VISIT MOD MDM: CPT | Mod: 25

## 2022-11-20 PROCEDURE — 63600175 PHARM REV CODE 636 W HCPCS: Performed by: NURSE PRACTITIONER

## 2022-11-20 PROCEDURE — 96372 THER/PROPH/DIAG INJ SC/IM: CPT | Performed by: NURSE PRACTITIONER

## 2022-11-20 RX ORDER — PROCHLORPERAZINE EDISYLATE 5 MG/ML
10 INJECTION INTRAMUSCULAR; INTRAVENOUS
Status: COMPLETED | OUTPATIENT
Start: 2022-11-20 | End: 2022-11-20

## 2022-11-20 RX ADMIN — PROCHLORPERAZINE EDISYLATE 10 MG: 5 INJECTION INTRAMUSCULAR; INTRAVENOUS at 10:11

## 2022-11-21 VITALS
BODY MASS INDEX: 31.1 KG/M2 | RESPIRATION RATE: 18 BRPM | DIASTOLIC BLOOD PRESSURE: 84 MMHG | TEMPERATURE: 99 F | HEART RATE: 96 BPM | SYSTOLIC BLOOD PRESSURE: 125 MMHG | OXYGEN SATURATION: 99 % | WEIGHT: 210 LBS | HEIGHT: 69 IN

## 2022-11-21 LAB
FLUAV AG UPPER RESP QL IA.RAPID: DETECTED
FLUBV AG UPPER RESP QL IA.RAPID: NOT DETECTED
SARS-COV-2 RNA RESP QL NAA+PROBE: NOT DETECTED

## 2022-11-21 RX ORDER — OSELTAMIVIR PHOSPHATE 75 MG/1
75 CAPSULE ORAL 2 TIMES DAILY
Qty: 10 CAPSULE | Refills: 0 | Status: SHIPPED | OUTPATIENT
Start: 2022-11-21 | End: 2022-11-26

## 2022-11-21 RX ORDER — OSELTAMIVIR PHOSPHATE 75 MG/1
75 CAPSULE ORAL 2 TIMES DAILY
Qty: 10 CAPSULE | Refills: 0 | Status: SHIPPED | OUTPATIENT
Start: 2022-11-21 | End: 2022-11-21 | Stop reason: SDUPTHER

## 2022-11-21 NOTE — ED PROVIDER NOTES
Encounter Date: 11/20/2022       History     Chief Complaint   Patient presents with    Headache     Patient complaint of headache, onset this morning.      Patient is a 32-year-old male  that presents with headache that has been present chronic intermittent. Associated symptoms nothing. Surrounding information is nothing. Exacerbated by nothing. Relieved by nothing. Patient treatment prior to arrival none. Risk factors include none. Other history pertaining to this complaint nothing.       The history is provided by the patient. No  was used.   Review of patient's allergies indicates:  No Known Allergies  Past Medical History:   Diagnosis Date    Migraines     Seizures      Past Surgical History:   Procedure Laterality Date    INNER EAR SURGERY Right      Family History   Problem Relation Age of Onset    Hypertension Mother      Social History     Tobacco Use    Smoking status: Heavy Smoker     Packs/day: 1.00     Types: Cigarettes    Smokeless tobacco: Never   Substance Use Topics    Alcohol use: Not Currently    Drug use: Never     Review of Systems   Constitutional:  Negative for fever.   Respiratory:  Negative for cough and shortness of breath.    Cardiovascular:  Negative for chest pain.   Gastrointestinal:  Negative for abdominal pain.   Genitourinary:  Negative for difficulty urinating and dysuria.   Musculoskeletal:  Negative for gait problem.   Skin:  Negative for color change.   Neurological:  Positive for headaches. Negative for dizziness and speech difficulty.   Psychiatric/Behavioral:  Negative for hallucinations and suicidal ideas.    All other systems reviewed and are negative.    Physical Exam     Initial Vitals [11/20/22 2155]   BP Pulse Resp Temp SpO2   (!) 147/89 106 20 99.3 °F (37.4 °C) 99 %      MAP       --         Physical Exam    Nursing note and vitals reviewed.  Constitutional: He appears well-developed and well-nourished.   HENT:   Head: Normocephalic.   Eyes: EOM are  normal.   Neck: Neck supple.   Normal range of motion.  Cardiovascular:  Normal rate, regular rhythm, normal heart sounds and intact distal pulses.           Pulmonary/Chest: Breath sounds normal.   Abdominal: Abdomen is soft. Bowel sounds are normal.   Musculoskeletal:         General: Normal range of motion.      Cervical back: Normal range of motion and neck supple.     Neurological: He is alert and oriented to person, place, and time. He has normal strength.   Skin: Skin is warm and dry. Capillary refill takes less than 2 seconds.   Psychiatric: He has a normal mood and affect. His behavior is normal. Judgment and thought content normal.       ED Course   Procedures  Labs Reviewed   COVID/FLU A&B PCR - Abnormal; Notable for the following components:       Result Value    Influenza A PCR Detected (*)     All other components within normal limits    Narrative:     The Xpert Xpress SARS-CoV-2/FLU/RSV plus is a rapid, multiplexed real-time PCR test intended for the simultaneous qualitative detection and differentiation of SARS-CoV-2, Influenza A, Influenza B, and respiratory syncytial virus (RSV) viral RNA in either nasopharyngeal swab or nasal swab specimens.                Imaging Results    None          Medications   prochlorperazine injection Soln 10 mg (10 mg Intramuscular Given 11/20/22 0064)     Medical Decision Making:   ED Management:  I did review patient's previous CT scans of head.  He has had multiple negative CT scans of the head this year.  Will treat him symptomatically.                        Clinical Impression:   Final diagnoses:  [J11.1] Influenza (Primary)      ED Disposition Condition    Discharge Stable          ED Prescriptions       Medication Sig Dispense Start Date End Date Auth. Provider    oseltamivir (TAMIFLU) 75 MG capsule Take 1 capsule (75 mg total) by mouth 2 (two) times daily. for 5 days 10 capsule 11/21/2022 11/26/2022 ADRIÁN Menard          Follow-up Information        Follow up With Specialties Details Why Contact Info    Your Primary Care Provider  Call in 3 days ed follow up              ADRIÁN Menard  11/21/22 0011

## 2022-11-21 NOTE — ED NOTES
Discharge instructions, return precautions, follow up information, medication education provided to pt. Pt verbalizes understanding. All questions answered. Pt is GCS 15, equal unlabored respirations. Pt ambulated to exit with steady gait.

## 2022-11-22 ENCOUNTER — HOSPITAL ENCOUNTER (EMERGENCY)
Facility: HOSPITAL | Age: 32
Discharge: HOME | End: 2022-11-22
Attending: EMERGENCY MEDICINE
Payer: COMMERCIAL

## 2022-11-22 VITALS
SYSTOLIC BLOOD PRESSURE: 122 MMHG | OXYGEN SATURATION: 100 % | HEART RATE: 91 BPM | RESPIRATION RATE: 20 BRPM | TEMPERATURE: 99 F | DIASTOLIC BLOOD PRESSURE: 91 MMHG

## 2022-11-22 DIAGNOSIS — J10.1 INFLUENZA A: Primary | ICD-10-CM

## 2022-11-22 DIAGNOSIS — R51.9 NONINTRACTABLE HEADACHE, UNSPECIFIED CHRONICITY PATTERN, UNSPECIFIED HEADACHE TYPE: ICD-10-CM

## 2022-11-22 PROCEDURE — 99284 EMERGENCY DEPT VISIT MOD MDM: CPT

## 2022-11-22 PROCEDURE — 25000003 PHARM REV CODE 250: Performed by: PHYSICIAN ASSISTANT

## 2022-11-22 RX ORDER — IBUPROFEN 800 MG/1
800 TABLET ORAL EVERY 6 HOURS PRN
Qty: 20 TABLET | Refills: 0 | OUTPATIENT
Start: 2022-11-22 | End: 2022-12-15

## 2022-11-22 RX ORDER — IBUPROFEN 400 MG/1
800 TABLET ORAL
Status: COMPLETED | OUTPATIENT
Start: 2022-11-22 | End: 2022-11-22

## 2022-11-22 RX ORDER — BENZONATATE 100 MG/1
100 CAPSULE ORAL 3 TIMES DAILY PRN
Qty: 20 CAPSULE | Refills: 0 | Status: SHIPPED | OUTPATIENT
Start: 2022-11-22 | End: 2022-12-02

## 2022-11-22 RX ADMIN — IBUPROFEN 800 MG: 400 TABLET ORAL at 11:11

## 2022-11-22 NOTE — Clinical Note
"Chun Patel"Amado was seen and treated in our emergency department on 11/22/2022.  He may return to work on 11/25/2022.       If you have any questions or concerns, please don't hesitate to call.      Cindy Love PA-C"

## 2022-11-23 NOTE — ED PROVIDER NOTES
Encounter Date: 11/22/2022       History     Chief Complaint   Patient presents with    Nasal Congestion    Cough     Chun Fischer is a 32 y.o. male with a history of migraines and seizures who presents to the ED complaining of a headache, cough, and nasal congestion x 3 days. Patient reports being positive for the flu 2 days ago. He has been compliant with tamiflu, but symptoms are not improving. He has not tried taking any OTC medications to help with his symptoms. He is requesting a prescription for ibuprofen. He denies any fevers, chills, vision changes, lightheadedness, syncope.     The history is provided by the patient. The history is limited by a language barrier. A  was used.   Review of patient's allergies indicates:  No Known Allergies  Past Medical History:   Diagnosis Date    Migraines     Seizures      Past Surgical History:   Procedure Laterality Date    INNER EAR SURGERY Right      Family History   Problem Relation Age of Onset    Hypertension Mother      Social History     Tobacco Use    Smoking status: Heavy Smoker     Packs/day: 1.00     Types: Cigarettes    Smokeless tobacco: Never   Substance Use Topics    Alcohol use: Not Currently    Drug use: Never     Review of Systems   Constitutional:  Negative for chills, fatigue and fever.   HENT:  Positive for congestion. Negative for sore throat.    Respiratory:  Positive for cough. Negative for shortness of breath.    Cardiovascular:  Negative for chest pain and leg swelling.   Gastrointestinal:  Negative for abdominal pain and nausea.   Genitourinary:  Negative for dysuria and frequency.   Musculoskeletal:  Negative for arthralgias and back pain.   Skin:  Negative for rash and wound.   Neurological:  Positive for headaches.   Psychiatric/Behavioral:  Negative for agitation and confusion.      Physical Exam     Initial Vitals [11/22/22 2235]   BP Pulse Resp Temp SpO2   (!) 122/91 91 20 98.6 °F (37 °C) 100 %      MAP       --          Physical Exam    Nursing note and vitals reviewed.  Constitutional: He appears well-developed and well-nourished. No distress.   HENT:   Head: Normocephalic and atraumatic.   Mouth/Throat: No oropharyngeal exudate.   Eyes: EOM are normal. No scleral icterus.   Neck: Neck supple.   Normal range of motion.  Cardiovascular:  Normal rate and regular rhythm.           No murmur heard.  Pulmonary/Chest: No respiratory distress. He has no wheezes.   Abdominal: Abdomen is soft. He exhibits no distension. There is no abdominal tenderness.   Musculoskeletal:         General: No tenderness. Normal range of motion.      Cervical back: Normal range of motion and neck supple.     Neurological: He is alert and oriented to person, place, and time. No cranial nerve deficit.   Skin: Skin is warm and dry. Capillary refill takes less than 2 seconds. No erythema.   Psychiatric: He has a normal mood and affect. Thought content normal.       ED Course   Procedures  Labs Reviewed - No data to display       Imaging Results    None          Medications   ibuprofen tablet 800 mg (has no administration in time range)                              Clinical Impression:   Final diagnoses:  [J10.1] Influenza A (Primary)  [R51.9] Nonintractable headache, unspecified chronicity pattern, unspecified headache type      ED Disposition Condition    Discharge Stable          ED Prescriptions       Medication Sig Dispense Start Date End Date Auth. Provider    benzonatate (TESSALON) 100 MG capsule Take 1 capsule (100 mg total) by mouth 3 (three) times daily as needed for Cough. 20 capsule 11/22/2022 12/2/2022 Cindy Love PA-C    ibuprofen (ADVIL,MOTRIN) 800 MG tablet Take 1 tablet (800 mg total) by mouth every 6 (six) hours as needed for Pain. 20 tablet 11/22/2022 -- Cindy Love PA-C          Follow-up Information       Follow up With Specialties Details Why Contact Info [facility] Emergency Medicine  If  symptoms worsen [address] [facility] [id number] [phone]    PCP  In 3 days Hospital follow up              Cindy Love PA-C  11/22/22 5697

## 2022-12-05 ENCOUNTER — HOSPITAL ENCOUNTER (EMERGENCY)
Facility: HOSPITAL | Age: 32
Discharge: HOME | End: 2022-12-06
Attending: FAMILY MEDICINE
Payer: COMMERCIAL

## 2022-12-05 VITALS
DIASTOLIC BLOOD PRESSURE: 72 MMHG | HEIGHT: 69 IN | RESPIRATION RATE: 20 BRPM | WEIGHT: 188.63 LBS | BODY MASS INDEX: 27.94 KG/M2 | TEMPERATURE: 98 F | OXYGEN SATURATION: 99 % | SYSTOLIC BLOOD PRESSURE: 113 MMHG | HEART RATE: 91 BPM

## 2022-12-05 DIAGNOSIS — J06.9 UPPER RESPIRATORY TRACT INFECTION, UNSPECIFIED TYPE: Primary | ICD-10-CM

## 2022-12-05 LAB
FLUAV AG UPPER RESP QL IA.RAPID: NOT DETECTED
FLUBV AG UPPER RESP QL IA.RAPID: NOT DETECTED
SARS-COV-2 RNA RESP QL NAA+PROBE: NOT DETECTED
STREP A PCR (OHS): NOT DETECTED

## 2022-12-05 PROCEDURE — 87651 STREP A DNA AMP PROBE: CPT | Performed by: PHYSICIAN ASSISTANT

## 2022-12-05 PROCEDURE — 0240U COVID/FLU A&B PCR: CPT | Performed by: PHYSICIAN ASSISTANT

## 2022-12-06 ENCOUNTER — HOSPITAL ENCOUNTER (EMERGENCY)
Facility: HOSPITAL | Age: 32
Discharge: HOME | End: 2022-12-06
Attending: EMERGENCY MEDICINE
Payer: COMMERCIAL

## 2022-12-06 VITALS
RESPIRATION RATE: 20 BRPM | HEART RATE: 86 BPM | DIASTOLIC BLOOD PRESSURE: 78 MMHG | SYSTOLIC BLOOD PRESSURE: 115 MMHG | OXYGEN SATURATION: 99 % | TEMPERATURE: 98 F

## 2022-12-06 DIAGNOSIS — U07.1 COVID-19: Primary | ICD-10-CM

## 2022-12-06 LAB
FLUAV AG UPPER RESP QL IA.RAPID: NOT DETECTED
FLUBV AG UPPER RESP QL IA.RAPID: NOT DETECTED
SARS-COV-2 RNA RESP QL NAA+PROBE: DETECTED
STREP A PCR (OHS): NOT DETECTED

## 2022-12-06 PROCEDURE — 99283 EMERGENCY DEPT VISIT LOW MDM: CPT

## 2022-12-06 PROCEDURE — 25000003 PHARM REV CODE 250: Performed by: NURSE PRACTITIONER

## 2022-12-06 PROCEDURE — 0240U COVID/FLU A&B PCR: CPT | Performed by: NURSE PRACTITIONER

## 2022-12-06 PROCEDURE — 87651 STREP A DNA AMP PROBE: CPT | Performed by: NURSE PRACTITIONER

## 2022-12-06 PROCEDURE — 99283 EMERGENCY DEPT VISIT LOW MDM: CPT | Mod: 25,27

## 2022-12-06 RX ORDER — BENZONATATE 100 MG/1
100 CAPSULE ORAL 3 TIMES DAILY PRN
Qty: 21 CAPSULE | Refills: 0 | Status: SHIPPED | OUTPATIENT
Start: 2022-12-06 | End: 2023-01-11 | Stop reason: ALTCHOICE

## 2022-12-06 RX ORDER — ACETAMINOPHEN 500 MG
1000 TABLET ORAL
Status: COMPLETED | OUTPATIENT
Start: 2022-12-06 | End: 2022-12-06

## 2022-12-06 RX ADMIN — ACETAMINOPHEN 1000 MG: 500 TABLET ORAL at 09:12

## 2022-12-06 NOTE — Clinical Note
"Chun Patel" Amado was seen and treated in our emergency department on 12/6/2022.  He may return to work on 12/12/2022.       If you have any questions or concerns, please don't hesitate to call.      ADRIÁN Richardson"

## 2022-12-06 NOTE — Clinical Note
"Chun Patel" Amado was seen and treated in our emergency department on 12/6/2022.  He may return to work on 12/12/2022.       If you have any questions or concerns, please don't hesitate to call.      Sonia Claire RN    "

## 2022-12-06 NOTE — ED PROVIDER NOTES
Encounter Date: 12/5/2022       History     Chief Complaint   Patient presents with    Sore Throat     Reports with sore throat, dry cough, and headache since yesterday.      Patient with pmhx of depression who is deaf presents today c/o sore throat, dry cough, and headache that started last night. No exacerbating or relieving factors. Denies other associated symptoms. He did test positive for the flu about 15 days ago, but says he was feeling better until last night. Denies known fever, chills, ear pain, rash, cp, sob.     The history is provided by the patient. The history is limited by a language barrier. A  was used.   Review of patient's allergies indicates:  No Known Allergies  Past Medical History:   Diagnosis Date    Migraines     Seizures      Past Surgical History:   Procedure Laterality Date    INNER EAR SURGERY Right      Family History   Problem Relation Age of Onset    Hypertension Mother      Social History     Tobacco Use    Smoking status: Heavy Smoker     Packs/day: 1.00     Types: Cigarettes    Smokeless tobacco: Never   Substance Use Topics    Alcohol use: Not Currently    Drug use: Never     Review of Systems   Constitutional:  Negative for chills and fever.   HENT:  Positive for sore throat. Negative for congestion, ear discharge, ear pain, postnasal drip, rhinorrhea, sinus pressure, sinus pain, trouble swallowing and voice change.    Eyes:  Negative for discharge.   Respiratory:  Positive for cough. Negative for chest tightness and shortness of breath.    Cardiovascular:  Negative for chest pain, palpitations and leg swelling.   Gastrointestinal:  Negative for abdominal pain, constipation, diarrhea, nausea and vomiting.   Genitourinary:  Negative for dysuria, flank pain and hematuria.   Musculoskeletal:  Negative for arthralgias and myalgias.   Skin:  Negative for rash.   Neurological:  Positive for headaches. Negative for syncope and light-headedness.   All other systems  reviewed and are negative.    Physical Exam     Initial Vitals [12/05/22 2217]   BP Pulse Resp Temp SpO2   113/72 91 20 97.9 °F (36.6 °C) 99 %      MAP       --         Physical Exam    Vitals reviewed.  Constitutional: He appears well-developed and well-nourished. He is not diaphoretic. No distress.   HENT:   Head: Normocephalic and atraumatic.   Right Ear: External ear normal.   Left Ear: External ear normal.   Mouth/Throat: Oropharynx is clear and moist. No oropharyngeal exudate.   Mild pharyngeal erythema    Eyes: Conjunctivae and EOM are normal.   Neck: Neck supple.   Normal range of motion.  Cardiovascular:  Normal rate, regular rhythm, normal heart sounds and intact distal pulses.           Pulmonary/Chest: Breath sounds normal. No respiratory distress. He has no wheezes. He has no rhonchi. He has no rales.   Abdominal: Abdomen is soft. Bowel sounds are normal. He exhibits no distension. There is no abdominal tenderness. There is no rebound.   Musculoskeletal:         General: No edema.      Cervical back: Normal range of motion and neck supple.     Neurological: He is alert and oriented to person, place, and time. GCS score is 15. GCS eye subscore is 4. GCS verbal subscore is 5. GCS motor subscore is 6.   Skin: Skin is warm and dry. Capillary refill takes less than 2 seconds. No rash noted.   Psychiatric: He has a normal mood and affect.       ED Course   Procedures  Labs Reviewed   COVID/FLU A&B PCR - Normal    Narrative:     The Xpert Xpress SARS-CoV-2/FLU/RSV plus is a rapid, multiplexed real-time PCR test intended for the simultaneous qualitative detection and differentiation of SARS-CoV-2, Influenza A, Influenza B, and respiratory syncytial virus (RSV) viral RNA in either nasopharyngeal swab or nasal swab specimens.         STREP GROUP A BY PCR - Normal    Narrative:     The Xpert Xpress Strep A test is a rapid, qualitative in vitro diagnostic test for the detection of Streptococcus pyogenes (Group A  ß-hemolytic Streptococcus, Strep A) in throat swab specimens from patients with signs and symptoms of pharyngitis.            Imaging Results    None          Medications - No data to display                       Clinical Impression:   Final diagnoses:  [J06.9] Upper respiratory tract infection, unspecified type (Primary)      ED Disposition Condition    Discharge Stable          ED Prescriptions       Medication Sig Dispense Start Date End Date Auth. Provider    benzonatate (TESSALON) 100 MG capsule Take 1 capsule (100 mg total) by mouth 3 (three) times daily as needed for Cough. 21 capsule 12/6/2022 -- CHANDU Pickering          Follow-up Information       Follow up With Specialties Details Why Contact Info    [facility] Emergency Medicine  If symptoms worsen return to ED immediately [address] [facility] [id number] [phone]    Primary Care Provider within 1-2 days  Go in 2 days               CHANDU Pickering  12/06/22 0020

## 2022-12-07 NOTE — ED PROVIDER NOTES
Encounter Date: 12/6/2022       History     Chief Complaint   Patient presents with    Sore Throat     C/o sore throat and headache that began 2 days ago. Patient denies fever, cough, and runny nose. States work sent home because he needs a work excuse. No OTC medications. Hard of hearing.      33 y/o male who presents with continued cough and sore throat for 2 days now. He was seen yesterday at [facility] and had neg swabs there. States still doesn't feel well and work is requesting a note for him to return. He is hard of hearing (very) but does read lips.     The history is provided by the patient. The history is limited by a language barrier. No  was used.   Sore Throat   This is a new problem. The sore throat symptoms include sore throat.The current episode started two days ago. The problem has been waxing and waning. There has been no fever. Associated symptoms include congestion and coughing. He has tried nothing for the symptoms.   Review of patient's allergies indicates:  No Known Allergies  Past Medical History:   Diagnosis Date    Migraines     Seizures      Past Surgical History:   Procedure Laterality Date    INNER EAR SURGERY Right      Family History   Problem Relation Age of Onset    Hypertension Mother      Social History     Tobacco Use    Smoking status: Heavy Smoker     Packs/day: 1.00     Types: Cigarettes    Smokeless tobacco: Never   Substance Use Topics    Alcohol use: Not Currently    Drug use: Never     Review of Systems   HENT:  Positive for congestion and sore throat.    Respiratory:  Positive for cough.    All other systems reviewed and are negative.    Physical Exam     Initial Vitals [12/06/22 1834]   BP Pulse Resp Temp SpO2   113/69 102 18 99.3 °F (37.4 °C) 98 %      MAP       --         Physical Exam    Nursing note and vitals reviewed.  Constitutional: He appears well-developed and well-nourished.   HENT:   Mouth/Throat: Posterior oropharyngeal erythema present. No  oropharyngeal exudate, posterior oropharyngeal edema or tonsillar abscesses.   Eyes: Conjunctivae are normal.   Cardiovascular:  Normal rate, regular rhythm and normal heart sounds.           Pulmonary/Chest: Breath sounds normal. No respiratory distress.   Musculoskeletal:         General: Normal range of motion.     Neurological: He is alert and oriented to person, place, and time.   Skin: Skin is warm and dry.       ED Course   Procedures  Labs Reviewed   COVID/FLU A&B PCR - Abnormal; Notable for the following components:       Result Value    SARS-CoV-2 PCR Detected (*)     All other components within normal limits    Narrative:     The Xpert Xpress SARS-CoV-2/FLU/RSV plus is a rapid, multiplexed real-time PCR test intended for the simultaneous qualitative detection and differentiation of SARS-CoV-2, Influenza A, Influenza B, and respiratory syncytial virus (RSV) viral RNA in either nasopharyngeal swab or nasal swab specimens.         STREP GROUP A BY PCR - Normal    Narrative:     The Xpert Xpress Strep A test is a rapid, qualitative in vitro diagnostic test for the detection of Streptococcus pyogenes (Group A ß-hemolytic Streptococcus, Strep A) in throat swab specimens from patients with signs and symptoms of pharyngitis.            Imaging Results    None          Medications   acetaminophen tablet 1,000 mg (has no administration in time range)     Medical Decision Making:   Clinical Tests:   Lab Tests: Ordered and Reviewed  Additional MDM:   Differential Diagnosis:   Other: The following diagnoses were also considered and will be evaluated: covid, flu and URI.                       Clinical Impression:   Final diagnoses:  [U07.1] COVID-19 (Primary)      ED Disposition Condition    Discharge Stable          ED Prescriptions    None       Follow-up Information       Follow up With Specialties Details Why Contact Info    primary care provider  Call in 1 week As needed, If symptoms worsen              Edith  ADRIÁN Coleman  12/06/22 2047

## 2022-12-07 NOTE — DISCHARGE INSTRUCTIONS
You are covid positive. Over the counter medicine such as tylenol, ibuprofen, vitamin C up to 3000mg per day, dayquil/nyquil. You are contagious, stay home for 5 days.

## 2022-12-10 ENCOUNTER — HOSPITAL ENCOUNTER (EMERGENCY)
Facility: HOSPITAL | Age: 32
Discharge: HOME | End: 2022-12-10
Attending: EMERGENCY MEDICINE
Payer: COMMERCIAL

## 2022-12-10 VITALS
TEMPERATURE: 98 F | DIASTOLIC BLOOD PRESSURE: 93 MMHG | WEIGHT: 210 LBS | OXYGEN SATURATION: 100 % | SYSTOLIC BLOOD PRESSURE: 142 MMHG | HEART RATE: 60 BPM | BODY MASS INDEX: 31.01 KG/M2 | RESPIRATION RATE: 18 BRPM

## 2022-12-10 DIAGNOSIS — U07.1 COVID-19: Primary | ICD-10-CM

## 2022-12-10 DIAGNOSIS — U07.1 COVID-19 VIRUS DETECTED: ICD-10-CM

## 2022-12-10 LAB — SARS-COV-2 RDRP RESP QL NAA+PROBE: POSITIVE

## 2022-12-10 PROCEDURE — 87635 SARS-COV-2 COVID-19 AMP PRB: CPT | Performed by: NURSE PRACTITIONER

## 2022-12-10 PROCEDURE — 99282 EMERGENCY DEPT VISIT SF MDM: CPT

## 2022-12-10 NOTE — Clinical Note
"Chun"Sanjeev Fischer was seen and treated in our emergency department on 12/10/2022.     COVID-19 is present in our communities across the state. There is limited testing for COVID at this time, so not all patients can be tested. In this situation, your employee meets the following criteria:    Chun Fischer has met the criteria for COVID-19 testing based upon symptoms, travel, and/or potential exposure. The test has been completed and is pending results at this time. During this time the employee is not able to work and should be quarantined per the Centers for Disease Control timelines.     If you have any questions or concerns, or if I can be of further assistance, please do not hesitate to contact me.    Sincerely,             ADRIÁN Cadena"

## 2022-12-10 NOTE — FIRST PROVIDER EVALUATION
Medical screening examination initiated.  I have conducted a focused provider triage encounter, findings are as follows:    Brief history of present illness:  32-year-old male presents to the ER requesting repeat COVID-19 test to return to work.  Denies any symptoms at this time.    Vitals:    12/10/22 1328   BP: 124/85   BP Location: Left arm   Patient Position: Sitting   Pulse: 75   Resp: 16   Temp: 98.1 °F (36.7 °C)   TempSrc: Oral   SpO2: 100%   Weight: 95.3 kg (210 lb)       Pertinent physical exam:  AAO x4    Brief workup plan:  COVID-19 swab    Preliminary workup initiated; this workup will be continued and followed by the physician or advanced practice provider that is assigned to the patient when roomed.

## 2022-12-10 NOTE — ED PROVIDER NOTES
Encounter Date: 12/10/2022       History     Chief Complaint   Patient presents with    COVID-19 Concerns     Pt presents requesting repeat covid test to return to work.  States dx last week.  Denies any s/s at present.       32-year-old male presents to the ER requesting repeat COVID-19 test to return to work.  Reports was diagnosed with COVID-19 on Monday.  States symptoms have resolved at this time.  Denies any chest pain or shortness of breath.       Review of patient's allergies indicates:  No Known Allergies  Past Medical History:   Diagnosis Date    Migraines     Seizures      Past Surgical History:   Procedure Laterality Date    INNER EAR SURGERY Right      Family History   Problem Relation Age of Onset    Hypertension Mother      Social History     Tobacco Use    Smoking status: Heavy Smoker     Packs/day: 1.00     Types: Cigarettes    Smokeless tobacco: Never   Substance Use Topics    Alcohol use: Not Currently    Drug use: Never     Review of Systems   Constitutional:  Negative for fever.   HENT:  Negative for sore throat.    Eyes:  Negative for visual disturbance.   Respiratory:  Negative for shortness of breath.    Cardiovascular:  Negative for chest pain.   Gastrointestinal:  Negative for abdominal pain.   Genitourinary:  Negative for dysuria.   Musculoskeletal:  Negative for joint swelling.   Skin:  Negative for rash.   Neurological:  Negative for weakness.   Psychiatric/Behavioral:  Negative for confusion.    All other systems reviewed and are negative.    Physical Exam     Initial Vitals [12/10/22 1328]   BP Pulse Resp Temp SpO2   124/85 75 16 98.1 °F (36.7 °C) 100 %      MAP       --         Physical Exam    Nursing note and vitals reviewed.  Constitutional: He appears well-developed and well-nourished. No distress.   HENT:   Head: Normocephalic and atraumatic.   Mouth/Throat: Oropharynx is clear and moist.   Eyes: Conjunctivae and EOM are normal. Pupils are equal, round, and reactive to light.    Neck: Neck supple.   Normal range of motion.  Cardiovascular:  Normal rate, regular rhythm and normal heart sounds.     Exam reveals no gallop and no friction rub.       No murmur heard.  Pulmonary/Chest: Breath sounds normal. No respiratory distress. He has no wheezes. He has no rhonchi. He has no rales.   Abdominal: Abdomen is soft. Bowel sounds are normal. He exhibits no distension and no mass. There is no abdominal tenderness. There is no rebound and no guarding.   Musculoskeletal:         General: No edema. Normal range of motion.      Cervical back: Normal range of motion and neck supple.     Neurological: He is alert and oriented to person, place, and time. He has normal strength.   Skin: Skin is warm and dry. No rash noted.   Psychiatric: He has a normal mood and affect. Thought content normal.       ED Course   Procedures  Labs Reviewed   SARS-COV-2 RNA AMPLIFICATION, QUAL - Abnormal; Notable for the following components:       Result Value    SARS COV-2 MOLECULAR Positive (*)     All other components within normal limits    Narrative:     The IDNOW COVID-19 assay is a rapid molecular in vitro diagnostic test utilizing an isothermal nucleic acid amplification technology intended for the qualitative detection of nucleic acid from the SARS-CoV-2 viral RNA in direct nasal, nasopharyngeal or throat swabs from individuals who are suspected of COVID-19 by their healthcare provider.          Imaging Results    None          Medications - No data to display  Medical Decision Making:   ED Management:  Discussed with the patient/family to take Tylenol or ibuprofen as needed for body aches or fever.  COVID-19 molecular swab is still positive.  Instructed patient to continue to wear mask x5 days.                        Clinical Impression:   Final diagnoses:  [U07.1] COVID-19 (Primary)        ED Disposition Condition    Discharge Stable          ED Prescriptions    None       Follow-up Information       Follow up  With Specialties Details Why Contact Info    [facility]  In 2 days  [address] [facility] [address]  [phone]               ADRIÁN Cadena  12/10/22 1632       ADRIÁN Cadena  12/10/22 1631

## 2022-12-11 ENCOUNTER — HOSPITAL ENCOUNTER (EMERGENCY)
Facility: HOSPITAL | Age: 32
Discharge: HOME | End: 2022-12-11
Attending: FAMILY MEDICINE
Payer: COMMERCIAL

## 2022-12-11 VITALS
RESPIRATION RATE: 20 BRPM | TEMPERATURE: 98 F | DIASTOLIC BLOOD PRESSURE: 86 MMHG | HEART RATE: 74 BPM | SYSTOLIC BLOOD PRESSURE: 129 MMHG | WEIGHT: 220 LBS | HEIGHT: 69 IN | OXYGEN SATURATION: 100 % | BODY MASS INDEX: 32.58 KG/M2

## 2022-12-11 DIAGNOSIS — U07.1 LAB TEST POSITIVE FOR DETECTION OF COVID-19 VIRUS: Primary | ICD-10-CM

## 2022-12-11 LAB
FLUAV AG UPPER RESP QL IA.RAPID: NOT DETECTED
FLUBV AG UPPER RESP QL IA.RAPID: NOT DETECTED
SARS-COV-2 RNA RESP QL NAA+PROBE: DETECTED

## 2022-12-11 PROCEDURE — 0240U COVID/FLU A&B PCR: CPT | Performed by: FAMILY MEDICINE

## 2022-12-11 PROCEDURE — 99282 EMERGENCY DEPT VISIT SF MDM: CPT

## 2022-12-11 NOTE — ED PROVIDER NOTES
Encounter Date: 12/11/2022       History     Chief Complaint   Patient presents with    COVID-19 Concerns       32-year-old male who was diagnosed with COVID on 12/06/2022 presents for repeat COVID testing.   Patient is asymptomatic.  Believes he needs a negative COVID to return to work.  No other complaints.    The history is provided by the patient. The history is limited by a language barrier. A  was used.   Review of patient's allergies indicates:  No Known Allergies  Past Medical History:   Diagnosis Date    Deaf     Migraines     Seizures      Past Surgical History:   Procedure Laterality Date    IMPLANTATION OF COCHLEAR PROSTHESIS      INNER EAR SURGERY Right      Family History   Problem Relation Age of Onset    Hypertension Mother      Social History     Tobacco Use    Smoking status: Heavy Smoker     Packs/day: 0.50     Types: Cigarettes    Smokeless tobacco: Never   Substance Use Topics    Alcohol use: Not Currently    Drug use: Never     Review of Systems   All other systems reviewed and are negative.    Physical Exam     Initial Vitals [12/11/22 1011]   BP Pulse Resp Temp SpO2   129/86 74 20 97.8 °F (36.6 °C) 100 %      MAP       --         Physical Exam    Nursing note and vitals reviewed.  Constitutional: He appears well-developed and well-nourished.   Hearing impaired.   used.   HENT:   Head: Normocephalic and atraumatic.   Eyes: EOM are normal. Pupils are equal, round, and reactive to light.   Neck: Neck supple.   Normal range of motion.  Cardiovascular:  Normal rate and regular rhythm.           Pulmonary/Chest: Breath sounds normal.   Abdominal: Abdomen is soft. Bowel sounds are normal.   Musculoskeletal:         General: Normal range of motion.      Cervical back: Normal range of motion and neck supple.     Neurological: He is alert.   Skin: Skin is warm. Capillary refill takes less than 2 seconds.   Psychiatric: He has a normal mood and affect.        ED Course   Procedures  Labs Reviewed   COVID/FLU A&B PCR - Abnormal; Notable for the following components:       Result Value    SARS-CoV-2 PCR Detected (*)     All other components within normal limits    Narrative:     The Xpert Xpress SARS-CoV-2/FLU/RSV plus is a rapid, multiplexed real-time PCR test intended for the simultaneous qualitative detection and differentiation of SARS-CoV-2, Influenza A, Influenza B, and respiratory syncytial virus (RSV) viral RNA in either nasopharyngeal swab or nasal swab specimens.                Imaging Results    None          Medications - No data to display  Medical Decision Making:   Clinical Tests:   Lab Tests: Ordered and Reviewed  ED Management:    Encouraged patient to speak with his job concerning their policy on returning to work after testing positive for COVID.  I explained that his COVID test could be positive for several weeks even if he is asymptomatic.  That he should not  continuously return to the ED for repeat COVID testing.  Patient voiced understanding.                        Clinical Impression:   Final diagnoses:  [U07.1] Lab test positive for detection of COVID-19 virus (Primary)        ED Disposition Condition    Discharge Stable          ED Prescriptions    None       Follow-up Information       Follow up With Specialties Details Why Contact Info    Your PCP  Today               King Go MD  12/11/22 9647

## 2022-12-15 ENCOUNTER — HOSPITAL ENCOUNTER (EMERGENCY)
Facility: HOSPITAL | Age: 32
Discharge: HOME | End: 2022-12-15
Attending: EMERGENCY MEDICINE
Payer: COMMERCIAL

## 2022-12-15 VITALS
OXYGEN SATURATION: 98 % | TEMPERATURE: 98 F | DIASTOLIC BLOOD PRESSURE: 67 MMHG | HEART RATE: 70 BPM | RESPIRATION RATE: 20 BRPM | SYSTOLIC BLOOD PRESSURE: 103 MMHG

## 2022-12-15 DIAGNOSIS — K02.9 PAIN DUE TO DENTAL CARIES: ICD-10-CM

## 2022-12-15 DIAGNOSIS — K08.89 DENTALGIA: Primary | ICD-10-CM

## 2022-12-15 DIAGNOSIS — K04.7 DENTAL INFECTION: ICD-10-CM

## 2022-12-15 DIAGNOSIS — K02.9 DENTAL CARIES: ICD-10-CM

## 2022-12-15 PROCEDURE — 63600175 PHARM REV CODE 636 W HCPCS: Performed by: EMERGENCY MEDICINE

## 2022-12-15 PROCEDURE — 96372 THER/PROPH/DIAG INJ SC/IM: CPT | Performed by: EMERGENCY MEDICINE

## 2022-12-15 PROCEDURE — 99284 EMERGENCY DEPT VISIT MOD MDM: CPT

## 2022-12-15 RX ORDER — PENICILLIN V POTASSIUM 500 MG/1
500 TABLET, FILM COATED ORAL EVERY 6 HOURS
Qty: 28 TABLET | Refills: 0 | Status: SHIPPED | OUTPATIENT
Start: 2022-12-15 | End: 2022-12-22

## 2022-12-15 RX ORDER — IBUPROFEN 800 MG/1
800 TABLET ORAL EVERY 6 HOURS PRN
Qty: 20 TABLET | Refills: 0 | Status: SHIPPED | OUTPATIENT
Start: 2022-12-15 | End: 2023-01-11 | Stop reason: ALTCHOICE

## 2022-12-15 RX ORDER — KETOROLAC TROMETHAMINE 30 MG/ML
30 INJECTION, SOLUTION INTRAMUSCULAR; INTRAVENOUS
Status: COMPLETED | OUTPATIENT
Start: 2022-12-15 | End: 2022-12-15

## 2022-12-15 RX ADMIN — KETOROLAC TROMETHAMINE 30 MG: 30 INJECTION, SOLUTION INTRAMUSCULAR at 02:12

## 2022-12-15 NOTE — ED PROVIDER NOTES
Encounter Date: 12/15/2022       History     Chief Complaint   Patient presents with    Dental Pain     Left sided mouth pain. Pt w/ difficulty speaking. Denies fever/trauma     32-year-old male presents ED complaining of left dental pain that began 5-6 days ago.  He admits to drinking sugary drinks and eating sugary food.  No traumatic injury and no fever or drainage but does report that it is worse at night and throbs when he tries to sleep.  He did not try any medication prior to coming in.  He does not know if he has a dentist or not.  Denies any other complaints    The history is provided by the patient. The history is limited by a language barrier. A  was used ([employer]).   Dental Pain  Primary symptoms do not include fever, shortness of breath or cough. The symptoms began several days ago. The symptoms are waxing and waning. The symptoms are new. The symptoms occur intermittently.   Additional symptoms include: jaw pain. Additional symptoms do not include: fatigue.   Review of patient's allergies indicates:  No Known Allergies  Past Medical History:   Diagnosis Date    Deaf     Migraines     Seizures      Past Surgical History:   Procedure Laterality Date    IMPLANTATION OF COCHLEAR PROSTHESIS      INNER EAR SURGERY Right      Family History   Problem Relation Age of Onset    Hypertension Mother      Social History     Tobacco Use    Smoking status: Heavy Smoker     Packs/day: 0.50     Types: Cigarettes    Smokeless tobacco: Never   Substance Use Topics    Alcohol use: Not Currently    Drug use: Never     Review of Systems   Constitutional:  Negative for appetite change, chills, diaphoresis, fatigue, fever and unexpected weight change.   HENT:  Positive for dental problem.    Respiratory:  Negative for cough, choking, chest tightness and shortness of breath.    Cardiovascular:  Negative for chest pain and leg swelling.   Gastrointestinal:  Negative for abdominal pain,  diarrhea, nausea and vomiting.   Musculoskeletal: Negative.    Skin: Negative.    Neurological: Negative.      Physical Exam     Initial Vitals [12/15/22 0210]   BP Pulse Resp Temp SpO2   103/67 70 20 98.2 °F (36.8 °C) 98 %      MAP       --         Physical Exam    Nursing note and vitals reviewed.  Constitutional: He appears well-developed and well-nourished. He is not diaphoretic. No distress.   HENT:   Head: Normocephalic and atraumatic.   Nose: Nose normal.   Poor dentition with them molar gingivitis and edema   Eyes: Conjunctivae and EOM are normal. Pupils are equal, round, and reactive to light.   Cardiovascular:  Normal rate and regular rhythm.           Pulmonary/Chest: No respiratory distress. He has no wheezes. He exhibits no tenderness.   Abdominal: Abdomen is soft. He exhibits no distension. There is no abdominal tenderness.   Musculoskeletal:         General: No edema. Normal range of motion.     Neurological: He is alert and oriented to person, place, and time. He has normal strength.   Skin: Skin is warm and dry. Capillary refill takes less than 2 seconds. No rash and no abscess noted. No erythema. No pallor.   Psychiatric: He has a normal mood and affect. His behavior is normal. Judgment and thought content normal.       ED Course   Procedures  Labs Reviewed - No data to display       Imaging Results    None          Medications   ketorolac injection 30 mg (30 mg Intramuscular Given 12/15/22 0245)     Medical Decision Making:   History:   Old Medical Records: I decided to obtain old medical records.  Old Records Summarized: records from previous admission(s).  Initial Assessment:   Dental pain  Differential Diagnosis:   Dental pain, abscess  ED Management:  No visible drainable abscess. Pain control, rx cor meds and dental resources provided                        Clinical Impression:   Final diagnoses:  [K08.89] Dentalgia (Primary)  [K04.7] Dental infection  [K02.9] Dental caries  [K02.9] Pain due  to dental caries        ED Disposition Condition    Discharge Stable          ED Prescriptions       Medication Sig Dispense Start Date End Date Auth. Provider    penicillin v potassium (VEETID) 500 MG tablet Take 1 tablet (500 mg total) by mouth every 6 (six) hours. for 7 days 28 tablet 12/15/2022 12/22/2022 Sara Vargas MD    ibuprofen (ADVIL,MOTRIN) 800 MG tablet Take 1 tablet (800 mg total) by mouth every 6 (six) hours as needed for Pain (take with food or milkd). 20 tablet 12/15/2022 -- Sara Vargas MD          Follow-up Information       Follow up With Specialties Details Why Contact Info    dentist        [facility] - Emergency Dept Emergency Medicine  As needed, If symptoms worsen [id number] [facility] [id number] [phone]             Sara Vargas MD  12/15/22 0590

## 2022-12-19 ENCOUNTER — HOSPITAL ENCOUNTER (EMERGENCY)
Facility: HOSPITAL | Age: 32
Discharge: LEFT BEFORE DISCHARGE | End: 2022-12-20
Payer: COMMERCIAL

## 2022-12-19 VITALS
OXYGEN SATURATION: 100 % | TEMPERATURE: 98 F | HEART RATE: 65 BPM | SYSTOLIC BLOOD PRESSURE: 120 MMHG | BODY MASS INDEX: 30.48 KG/M2 | HEIGHT: 72 IN | WEIGHT: 225 LBS | RESPIRATION RATE: 18 BRPM | DIASTOLIC BLOOD PRESSURE: 77 MMHG

## 2022-12-19 PROCEDURE — 0240U COVID/FLU A&B PCR: CPT | Performed by: STUDENT IN AN ORGANIZED HEALTH CARE EDUCATION/TRAINING PROGRAM

## 2022-12-19 PROCEDURE — 99900041 HC LEFT WITHOUT BEING SEEN- EMERGENCY

## 2022-12-19 PROCEDURE — 87651 STREP A DNA AMP PROBE: CPT | Performed by: STUDENT IN AN ORGANIZED HEALTH CARE EDUCATION/TRAINING PROGRAM

## 2023-01-03 ENCOUNTER — HOSPITAL ENCOUNTER (EMERGENCY)
Facility: HOSPITAL | Age: 33
Discharge: LEFT BEFORE DISCHARGE | End: 2023-01-04
Payer: COMMERCIAL

## 2023-01-03 VITALS
OXYGEN SATURATION: 98 % | WEIGHT: 206 LBS | SYSTOLIC BLOOD PRESSURE: 126 MMHG | RESPIRATION RATE: 20 BRPM | HEART RATE: 95 BPM | DIASTOLIC BLOOD PRESSURE: 81 MMHG | BODY MASS INDEX: 34.32 KG/M2 | HEIGHT: 65 IN | TEMPERATURE: 98 F

## 2023-01-03 LAB
APPEARANCE UR: CLEAR
BACTERIA #/AREA URNS AUTO: NORMAL /HPF
BILIRUB UR QL STRIP.AUTO: NEGATIVE MG/DL
C TRACH DNA SPEC QL NAA+PROBE: NOT DETECTED
COLOR UR AUTO: YELLOW
GLUCOSE UR QL STRIP.AUTO: NEGATIVE MG/DL
KETONES UR QL STRIP.AUTO: NEGATIVE MG/DL
LEUKOCYTE ESTERASE UR QL STRIP.AUTO: NEGATIVE UNIT/L
N GONORRHOEA DNA SPEC QL NAA+PROBE: NOT DETECTED
NITRITE UR QL STRIP.AUTO: NEGATIVE
PH UR STRIP.AUTO: 6.5 [PH]
PROT UR QL STRIP.AUTO: NEGATIVE MG/DL
RBC #/AREA URNS AUTO: NORMAL /HPF
RBC UR QL AUTO: NEGATIVE UNIT/L
SP GR UR STRIP.AUTO: 1 (ref 1–1.03)
SQUAMOUS #/AREA URNS AUTO: NORMAL /HPF
UROBILINOGEN UR STRIP-ACNC: 0.2 MG/DL
WBC #/AREA URNS AUTO: NORMAL /HPF

## 2023-01-03 PROCEDURE — 87491 CHLMYD TRACH DNA AMP PROBE: CPT

## 2023-01-03 PROCEDURE — 99283 EMERGENCY DEPT VISIT LOW MDM: CPT | Mod: 25

## 2023-01-03 PROCEDURE — 87591 N.GONORRHOEAE DNA AMP PROB: CPT

## 2023-01-03 PROCEDURE — 81001 URINALYSIS AUTO W/SCOPE: CPT

## 2023-01-04 ENCOUNTER — HOSPITAL ENCOUNTER (EMERGENCY)
Facility: HOSPITAL | Age: 33
Discharge: HOME | End: 2023-01-04
Attending: INTERNAL MEDICINE
Payer: COMMERCIAL

## 2023-01-04 VITALS
HEART RATE: 98 BPM | RESPIRATION RATE: 18 BRPM | WEIGHT: 180.75 LBS | OXYGEN SATURATION: 98 % | SYSTOLIC BLOOD PRESSURE: 136 MMHG | BODY MASS INDEX: 26.77 KG/M2 | HEIGHT: 69 IN | TEMPERATURE: 99 F | DIASTOLIC BLOOD PRESSURE: 79 MMHG

## 2023-01-04 DIAGNOSIS — R21 RASH IN ADULT: ICD-10-CM

## 2023-01-04 DIAGNOSIS — J06.9 VIRAL URI: Primary | ICD-10-CM

## 2023-01-04 LAB
APPEARANCE UR: CLEAR
BACTERIA #/AREA URNS AUTO: ABNORMAL /HPF
BILIRUB UR QL STRIP.AUTO: NEGATIVE MG/DL
COLOR UR AUTO: YELLOW
FLUAV AG UPPER RESP QL IA.RAPID: NOT DETECTED
FLUBV AG UPPER RESP QL IA.RAPID: NOT DETECTED
GLUCOSE UR QL STRIP.AUTO: NORMAL MG/DL
HYALINE CASTS #/AREA URNS LPF: ABNORMAL /LPF
KETONES UR QL STRIP.AUTO: NEGATIVE MG/DL
LEUKOCYTE ESTERASE UR QL STRIP.AUTO: NEGATIVE UNIT/L
NITRITE UR QL STRIP.AUTO: NEGATIVE
PH UR STRIP.AUTO: 6 [PH]
PROT UR QL STRIP.AUTO: NEGATIVE MG/DL
RBC #/AREA URNS AUTO: ABNORMAL /HPF
RBC UR QL AUTO: NEGATIVE UNIT/L
SARS-COV-2 RNA RESP QL NAA+PROBE: NOT DETECTED
SP GR UR STRIP.AUTO: 1.02
SQUAMOUS #/AREA URNS LPF: ABNORMAL /HPF
STREP A PCR (OHS): NOT DETECTED
UROBILINOGEN UR STRIP-ACNC: ABNORMAL MG/DL
WBC #/AREA URNS AUTO: ABNORMAL /HPF

## 2023-01-04 PROCEDURE — 87651 STREP A DNA AMP PROBE: CPT | Performed by: PHYSICIAN ASSISTANT

## 2023-01-04 PROCEDURE — 99284 EMERGENCY DEPT VISIT MOD MDM: CPT

## 2023-01-04 PROCEDURE — 0240U COVID/FLU A&B PCR: CPT | Performed by: PHYSICIAN ASSISTANT

## 2023-01-04 PROCEDURE — 81001 URINALYSIS AUTO W/SCOPE: CPT | Performed by: PHYSICIAN ASSISTANT

## 2023-01-04 RX ORDER — CLOTRIMAZOLE 1 %
CREAM (GRAM) TOPICAL 2 TIMES DAILY
Qty: 45 G | Refills: 0 | Status: SHIPPED | OUTPATIENT
Start: 2023-01-04 | End: 2024-03-11

## 2023-01-04 NOTE — Clinical Note
"Chun Patel" Amado was seen and treated in our emergency department on 1/4/2023.  He may return to work on 01/05/2023.       If you have any questions or concerns, please don't hesitate to call.      Shirley Lino RN    "

## 2023-01-04 NOTE — FIRST PROVIDER EVALUATION
Medical screening examination initiated.  I have conducted a focused provider triage encounter, findings are as follows:    Brief history of present illness:  reports dysuria and painless lesion on penis. Recent unprotected sex.     There were no vitals filed for this visit.    Pertinent physical exam:  ambulatory into triage, alert, non-labored breathing.     Brief workup plan:  UA, GC/C    Preliminary workup initiated; this workup will be continued and followed by the physician or advanced practice provider that is assigned to the patient when roomed.

## 2023-01-05 NOTE — ED PROVIDER NOTES
Encounter Date: 1/4/2023       History     Chief Complaint   Patient presents with    Influenza     Pt c/o flu like symptoms x 1 day, sore throat, and genital rash, reports sexual intercourse with condoms     Patient reports to the ER with complaints of a sore throat for the past x1 day - pt denies fever and cough; pt also reports a rash to his thig/groin that started after having sex - pt was using protection    The history is provided by the patient. The history is limited by a language barrier. A  was used.   Influenza  This is a new problem. The current episode started yesterday. The problem has not changed since onset.Pertinent negatives include no chest pain, no abdominal pain, no headaches and no shortness of breath. Nothing aggravates the symptoms.   Review of patient's allergies indicates:  No Known Allergies  Past Medical History:   Diagnosis Date    Deaf     Migraines     Seizures      Past Surgical History:   Procedure Laterality Date    IMPLANTATION OF COCHLEAR PROSTHESIS      INNER EAR SURGERY Right      Family History   Problem Relation Age of Onset    Hypertension Mother      Social History     Tobacco Use    Smoking status: Heavy Smoker     Packs/day: 0.50     Types: Cigarettes    Smokeless tobacco: Never   Substance Use Topics    Alcohol use: Not Currently    Drug use: Never     Review of Systems   Constitutional:  Negative for fever.   HENT:  Positive for sore throat. Negative for drooling.    Respiratory:  Negative for shortness of breath and wheezing.    Cardiovascular:  Negative for chest pain.   Gastrointestinal:  Negative for abdominal pain and nausea.   Genitourinary:  Negative for dysuria.   Musculoskeletal:  Negative for back pain.   Skin:  Negative for wound.   Neurological:  Negative for weakness and headaches.   Hematological:  Does not bruise/bleed easily.     Physical Exam     Initial Vitals [01/04/23 1852]   BP Pulse Resp Temp SpO2   136/79 110 20 99.3 °F (37.4  °C) 98 %      MAP       --         Physical Exam    Constitutional: He appears well-developed.   HENT:   Head: Normocephalic and atraumatic. Head is without raccoon's eyes and without Del Valle's sign.   Mouth/Throat: Uvula is midline and mucous membranes are normal. Posterior oropharyngeal erythema present. No oropharyngeal exudate or posterior oropharyngeal edema.   Eyes: Conjunctivae and EOM are normal. Pupils are equal, round, and reactive to light.   Neck:   Normal range of motion.  Cardiovascular:  Normal rate, regular rhythm and normal heart sounds.           Pulmonary/Chest: Breath sounds normal. He exhibits no tenderness.   Abdominal: Abdomen is soft. Bowel sounds are normal. He exhibits no distension. There is no abdominal tenderness.   Musculoskeletal:         General: Normal range of motion.      Cervical back: Normal range of motion.     Neurological: He is alert and oriented to person, place, and time. He displays normal reflexes. No cranial nerve deficit or sensory deficit. GCS score is 15. GCS eye subscore is 4. GCS verbal subscore is 5. GCS motor subscore is 6.   Skin: Skin is warm. Rash noted. No pallor.        Rash consistent with possible jock itch, involves the proximal thigh, no involvement of groin/penis/scrotum   Psychiatric: He has a normal mood and affect. His behavior is normal. Judgment and thought content normal.       ED Course   Procedures  Labs Reviewed   URINALYSIS, REFLEX TO URINE CULTURE - Abnormal; Notable for the following components:       Result Value    Urobilinogen, UA 1+ (*)     Squamous Epithelial Cells, UA Trace (*)     All other components within normal limits   COVID/FLU A&B PCR - Normal    Narrative:     The Xpert Xpress SARS-CoV-2/FLU/RSV plus is a rapid, multiplexed real-time PCR test intended for the simultaneous qualitative detection and differentiation of SARS-CoV-2, Influenza A, Influenza B, and respiratory syncytial virus (RSV) viral RNA in either nasopharyngeal  swab or nasal swab specimens.         STREP GROUP A BY PCR - Normal    Narrative:     The Xpert Xpress Strep A test is a rapid, qualitative in vitro diagnostic test for the detection of Streptococcus pyogenes (Group A ß-hemolytic Streptococcus, Strep A) in throat swab specimens from patients with signs and symptoms of pharyngitis.            Imaging Results    None          Medications - No data to display                           Clinical Impression:   Final diagnoses:  [J06.9] Viral URI (Primary)  [R21] Rash in adult        ED Disposition Condition    Discharge Stable          ED Prescriptions       Medication Sig Dispense Start Date End Date Auth. Provider    benzocaine-menthoL 6-10 mg lozenge Take 1 lozenge by mouth every 4 (four) hours as needed for Pain. 18 tablet 1/4/2023 1/9/2023 CHANDU Alexander    clotrimazole (LOTRIMIN) 1 % cream Apply topically 2 (two) times daily. for 7 days 45 g 1/4/2023 1/11/2023 CHANDU Alexander          Follow-up Information       Follow up With Specialties Details Why Contact Info    discharge followup    If your symptoms become WORSE or you DO NOT IMPROVE and you are unable to reach your health care provider, you should RETURN to the emergency department    discharge info    Discussed all pertinent ED information, results, diagnosis and treatment plan; All questions and concerns were addressed at this time. Patient voices understanding of information and instructions. Patient is comfortable with plan and discharge             CHANDU Alexander  01/04/23 0073

## 2023-01-11 ENCOUNTER — HOSPITAL ENCOUNTER (EMERGENCY)
Facility: HOSPITAL | Age: 33
Discharge: HOME | End: 2023-01-11
Attending: EMERGENCY MEDICINE
Payer: COMMERCIAL

## 2023-01-11 VITALS
BODY MASS INDEX: 27.75 KG/M2 | OXYGEN SATURATION: 99 % | WEIGHT: 187.38 LBS | TEMPERATURE: 98 F | SYSTOLIC BLOOD PRESSURE: 125 MMHG | HEART RATE: 88 BPM | RESPIRATION RATE: 17 BRPM | HEIGHT: 69 IN | DIASTOLIC BLOOD PRESSURE: 89 MMHG

## 2023-01-11 DIAGNOSIS — J02.9 VIRAL PHARYNGITIS: ICD-10-CM

## 2023-01-11 DIAGNOSIS — J06.9 VIRAL URI WITH COUGH: Primary | ICD-10-CM

## 2023-01-11 PROCEDURE — 0240U COVID/FLU A&B PCR: CPT | Performed by: EMERGENCY MEDICINE

## 2023-01-11 PROCEDURE — 99283 EMERGENCY DEPT VISIT LOW MDM: CPT | Mod: 25

## 2023-01-11 PROCEDURE — 25000003 PHARM REV CODE 250: Performed by: EMERGENCY MEDICINE

## 2023-01-11 PROCEDURE — 87651 STREP A DNA AMP PROBE: CPT | Performed by: EMERGENCY MEDICINE

## 2023-01-11 RX ORDER — DEXAMETHASONE 4 MG/1
4 TABLET ORAL
Status: DISCONTINUED | OUTPATIENT
Start: 2023-01-11 | End: 2023-01-11 | Stop reason: HOSPADM

## 2023-01-11 RX ORDER — LIDOCAINE HYDROCHLORIDE 20 MG/ML
5 SOLUTION OROPHARYNGEAL
Status: COMPLETED | OUTPATIENT
Start: 2023-01-11 | End: 2023-01-11

## 2023-01-11 RX ADMIN — LIDOCAINE HYDROCHLORIDE 5 ML: 20 SOLUTION ORAL; TOPICAL at 06:01

## 2023-01-11 NOTE — ED PROVIDER NOTES
Encounter Date: 1/11/2023       History     Chief Complaint   Patient presents with    Cough    Sore Throat     States sore throat and cough x 2 days.       Mr. Chun Fischer is a 31 yo male who presents with chief complaint sore throat. Reports sore throat over the past 3 days that is constant, is aggravated by eating and drinking especially cold substances, somewhat improved with drinking hot liquids.  Associated symptoms of cough that began today.  Denies fever or chills.    The history is provided by the patient. The history is limited by a language barrier. A  was used.   Review of patient's allergies indicates:  No Known Allergies  Past Medical History:   Diagnosis Date    Deaf     Migraines     Seizures      Past Surgical History:   Procedure Laterality Date    IMPLANTATION OF COCHLEAR PROSTHESIS      INNER EAR SURGERY Right      Family History   Problem Relation Age of Onset    Hypertension Mother      Social History     Tobacco Use    Smoking status: Heavy Smoker     Packs/day: 0.50     Types: Cigarettes    Smokeless tobacco: Never   Substance Use Topics    Alcohol use: Not Currently    Drug use: Never     Review of Systems    Physical Exam     Initial Vitals [01/11/23 0357]   BP Pulse Resp Temp SpO2   125/89 90 17 98.2 °F (36.8 °C) 99 %      MAP       --         Physical Exam    Nursing note and vitals reviewed.  Constitutional: He appears well-developed and well-nourished. No distress.   HENT:   Head: Normocephalic and atraumatic.   Nose: Nose normal.   Mouth/Throat: Mucous membranes are normal. Posterior oropharyngeal erythema present. No tonsillar abscesses.   Tonsils erythematous with exudate.   Eyes: Conjunctivae and EOM are normal. Pupils are equal, round, and reactive to light.   Neck: Neck supple. No tracheal deviation present.   Cardiovascular:  Normal rate, regular rhythm, normal heart sounds, intact distal pulses and normal pulses.           Pulmonary/Chest: Effort normal  and breath sounds normal. No respiratory distress.   Abdominal: Abdomen is soft. There is no abdominal tenderness. There is no rebound and no guarding.   Musculoskeletal:         General: Normal range of motion.      Cervical back: Neck supple.      Comments: No midline spine tenderness. Extremities symmetric without gross deformity.     Lymphadenopathy:     He has cervical adenopathy.   Neurological: He is alert.   Skin: Skin is warm, dry and intact.   Psychiatric: He has a normal mood and affect. His speech is normal. Cognition and memory are normal.       ED Course   Procedures  Labs Reviewed   COVID/FLU A&B PCR - Normal    Narrative:     The Xpert Xpress SARS-CoV-2/FLU/RSV plus is a rapid, multiplexed real-time PCR test intended for the simultaneous qualitative detection and differentiation of SARS-CoV-2, Influenza A, Influenza B, and respiratory syncytial virus (RSV) viral RNA in either nasopharyngeal swab or nasal swab specimens.         STREP GROUP A BY PCR - Normal    Narrative:     The Xpert Xpress Strep A test is a rapid, qualitative in vitro diagnostic test for the detection of Streptococcus pyogenes (Group A ß-hemolytic Streptococcus, Strep A) in throat swab specimens from patients with signs and symptoms of pharyngitis.            Imaging Results    None          Medications   dexAMETHasone tablet 4 mg (4 mg Oral Not Given 1/11/23 0700)   LIDOcaine HCl 2% oral solution 5 mL (5 mLs Oral Given 1/11/23 0615)     Medical Decision Making:   Initial Assessment:   Well-appearing 32-year-old male who presents with sore throat for the past 3 days having slight cough that began earlier today, denies any fevers or chest pain or shortness of breath.  He does have some erythema to the posterior oropharynx and tonsils with exudate.  Strep swab negative.  COVID and flu negative.  Given a dose of Decadron in hopes that this will reduce some of his discomfort.  Instructed to follow up with PCP with strict return  precautions.  I have spoken with the patient and/or caregivers. I have explained the patient's condition, diagnoses and treatment plan based on the information available to me at this time. I have answered the patient's and/or caregiver's questions and addressed any concerns. The patient and/or caregivers have as good an understanding of the patient's diagnosis, condition and treatment plan as can be expected at this point. The vital signs have been stable. The patient's condition is stable and appropriate for discharge from the emergency department.     The patient will pursue further outpatient evaluation with the primary care physician or other designated or consulting physician as outlined in the discharge instructions. The patient and/or caregivers are agreeable to this plan of care and follow-up instructions have been explained in detail. The patient and/or caregivers have received these instructions in written format and have expressed an understanding of the discharge instructions. The patient and/or caregivers are aware that any significant change in condition or worsening of symptoms should prompt an immediate return to this or the closest emergency department or a call to 911.  Clinical Tests:   Lab Tests: Ordered and Reviewed                        Clinical Impression:   Final diagnoses:  [J06.9] Viral URI with cough (Primary)  [J02.9] Viral pharyngitis        ED Disposition Condition    Discharge Stable          ED Prescriptions    None       Follow-up Information    None          Landon Glaser DO  01/11/23 0856

## 2023-01-12 ENCOUNTER — HOSPITAL ENCOUNTER (EMERGENCY)
Facility: HOSPITAL | Age: 33
Discharge: HOME | End: 2023-01-12
Attending: EMERGENCY MEDICINE
Payer: COMMERCIAL

## 2023-01-12 VITALS
HEIGHT: 69 IN | HEART RATE: 95 BPM | DIASTOLIC BLOOD PRESSURE: 80 MMHG | TEMPERATURE: 98 F | OXYGEN SATURATION: 96 % | SYSTOLIC BLOOD PRESSURE: 132 MMHG | RESPIRATION RATE: 16 BRPM | BODY MASS INDEX: 25.92 KG/M2 | WEIGHT: 175 LBS

## 2023-01-12 DIAGNOSIS — J02.8 ACUTE BACTERIAL PHARYNGITIS: Primary | ICD-10-CM

## 2023-01-12 DIAGNOSIS — B96.89 ACUTE BACTERIAL PHARYNGITIS: Primary | ICD-10-CM

## 2023-01-12 DIAGNOSIS — R05.1 ACUTE COUGH: ICD-10-CM

## 2023-01-12 LAB
ALBUMIN SERPL-MCNC: 3.5 G/DL (ref 3.5–5)
ALBUMIN/GLOB SERPL: 1 RATIO (ref 1.1–2)
ALP SERPL-CCNC: 85 UNIT/L (ref 40–150)
ALT SERPL-CCNC: 15 UNIT/L (ref 0–55)
AST SERPL-CCNC: 19 UNIT/L (ref 5–34)
BASOPHILS # BLD AUTO: 0.01 X10(3)/MCL (ref 0–0.2)
BASOPHILS NFR BLD AUTO: 0.2 %
BILIRUBIN DIRECT+TOT PNL SERPL-MCNC: 0.3 MG/DL
BUN SERPL-MCNC: 9.2 MG/DL (ref 8.9–20.6)
CALCIUM SERPL-MCNC: 8.7 MG/DL (ref 8.4–10.2)
CHLORIDE SERPL-SCNC: 104 MMOL/L (ref 98–107)
CO2 SERPL-SCNC: 24 MMOL/L (ref 22–29)
CREAT SERPL-MCNC: 0.79 MG/DL (ref 0.73–1.18)
CRP SERPL-MCNC: 27.6 MG/L
EOSINOPHIL # BLD AUTO: 0.11 X10(3)/MCL (ref 0–0.9)
EOSINOPHIL NFR BLD AUTO: 1.8 %
ERYTHROCYTE [DISTWIDTH] IN BLOOD BY AUTOMATED COUNT: 13.2 % (ref 11.5–17)
ERYTHROCYTE [SEDIMENTATION RATE] IN BLOOD: 24 MM/HR (ref 0–15)
GFR SERPLBLD CREATININE-BSD FMLA CKD-EPI: >60 MLS/MIN/1.73/M2
GLOBULIN SER-MCNC: 3.4 GM/DL (ref 2.4–3.5)
GLUCOSE SERPL-MCNC: 96 MG/DL (ref 74–100)
HCT VFR BLD AUTO: 34.8 % (ref 42–52)
HGB BLD-MCNC: 11.7 GM/DL (ref 14–18)
IMM GRANULOCYTES # BLD AUTO: 0.02 X10(3)/MCL (ref 0–0.04)
IMM GRANULOCYTES NFR BLD AUTO: 0.3 %
LYMPHOCYTES # BLD AUTO: 2.09 X10(3)/MCL (ref 0.6–4.6)
LYMPHOCYTES NFR BLD AUTO: 34.8 %
MCH RBC QN AUTO: 31.4 PG
MCHC RBC AUTO-ENTMCNC: 33.6 MG/DL (ref 33–36)
MCV RBC AUTO: 93.3 FL (ref 80–94)
MONOCYTES # BLD AUTO: 0.42 X10(3)/MCL (ref 0.1–1.3)
MONOCYTES NFR BLD AUTO: 7 %
NEUTROPHILS # BLD AUTO: 3.36 X10(3)/MCL (ref 2.1–9.2)
NEUTROPHILS NFR BLD AUTO: 55.9 %
NRBC BLD AUTO-RTO: 0 %
PLATELET # BLD AUTO: 248 X10(3)/MCL (ref 130–400)
PMV BLD AUTO: 10.3 FL (ref 7.4–10.4)
POTASSIUM SERPL-SCNC: 4 MMOL/L (ref 3.5–5.1)
PROT SERPL-MCNC: 6.9 GM/DL (ref 6.4–8.3)
RBC # BLD AUTO: 3.73 X10(6)/MCL (ref 4.7–6.1)
SODIUM SERPL-SCNC: 137 MMOL/L (ref 136–145)
WBC # SPEC AUTO: 6 X10(3)/MCL (ref 4.5–11.5)

## 2023-01-12 PROCEDURE — 86140 C-REACTIVE PROTEIN: CPT | Performed by: STUDENT IN AN ORGANIZED HEALTH CARE EDUCATION/TRAINING PROGRAM

## 2023-01-12 PROCEDURE — 85651 RBC SED RATE NONAUTOMATED: CPT | Performed by: STUDENT IN AN ORGANIZED HEALTH CARE EDUCATION/TRAINING PROGRAM

## 2023-01-12 PROCEDURE — 63600175 PHARM REV CODE 636 W HCPCS: Performed by: EMERGENCY MEDICINE

## 2023-01-12 PROCEDURE — 96375 TX/PRO/DX INJ NEW DRUG ADDON: CPT | Mod: 59

## 2023-01-12 PROCEDURE — 25500020 PHARM REV CODE 255: Performed by: EMERGENCY MEDICINE

## 2023-01-12 PROCEDURE — 99285 EMERGENCY DEPT VISIT HI MDM: CPT | Mod: 25

## 2023-01-12 PROCEDURE — 80053 COMPREHEN METABOLIC PANEL: CPT | Performed by: STUDENT IN AN ORGANIZED HEALTH CARE EDUCATION/TRAINING PROGRAM

## 2023-01-12 PROCEDURE — 96374 THER/PROPH/DIAG INJ IV PUSH: CPT | Mod: 59

## 2023-01-12 PROCEDURE — 85025 COMPLETE CBC W/AUTO DIFF WBC: CPT | Performed by: STUDENT IN AN ORGANIZED HEALTH CARE EDUCATION/TRAINING PROGRAM

## 2023-01-12 RX ORDER — CLINDAMYCIN HYDROCHLORIDE 300 MG/1
300 CAPSULE ORAL EVERY 8 HOURS
Qty: 30 CAPSULE | Refills: 0 | Status: SHIPPED | OUTPATIENT
Start: 2023-01-12 | End: 2023-01-22

## 2023-01-12 RX ORDER — IBUPROFEN 800 MG/1
800 TABLET ORAL EVERY 6 HOURS PRN
Qty: 20 TABLET | Refills: 0 | OUTPATIENT
Start: 2023-01-12 | End: 2023-05-22

## 2023-01-12 RX ORDER — DEXAMETHASONE 4 MG/1
4 TABLET ORAL EVERY 12 HOURS
Qty: 10 TABLET | Refills: 0 | Status: SHIPPED | OUTPATIENT
Start: 2023-01-12 | End: 2023-01-17

## 2023-01-12 RX ORDER — KETOROLAC TROMETHAMINE 30 MG/ML
15 INJECTION, SOLUTION INTRAMUSCULAR; INTRAVENOUS
Status: COMPLETED | OUTPATIENT
Start: 2023-01-12 | End: 2023-01-12

## 2023-01-12 RX ORDER — FAMOTIDINE 20 MG/1
20 TABLET, FILM COATED ORAL NIGHTLY
Qty: 10 TABLET | Refills: 0 | Status: SHIPPED | OUTPATIENT
Start: 2023-01-12 | End: 2024-03-11

## 2023-01-12 RX ORDER — DEXAMETHASONE SODIUM PHOSPHATE 4 MG/ML
8 INJECTION, SOLUTION INTRA-ARTICULAR; INTRALESIONAL; INTRAMUSCULAR; INTRAVENOUS; SOFT TISSUE
Status: COMPLETED | OUTPATIENT
Start: 2023-01-12 | End: 2023-01-12

## 2023-01-12 RX ADMIN — IOPAMIDOL 100 ML: 755 INJECTION, SOLUTION INTRAVENOUS at 05:01

## 2023-01-12 RX ADMIN — KETOROLAC TROMETHAMINE 15 MG: 30 INJECTION, SOLUTION INTRAMUSCULAR; INTRAVENOUS at 04:01

## 2023-01-12 RX ADMIN — SODIUM CHLORIDE, POTASSIUM CHLORIDE, SODIUM LACTATE AND CALCIUM CHLORIDE 1000 ML: 600; 310; 30; 20 INJECTION, SOLUTION INTRAVENOUS at 04:01

## 2023-01-12 RX ADMIN — DEXAMETHASONE SODIUM PHOSPHATE 8 MG: 4 INJECTION, SOLUTION INTRA-ARTICULAR; INTRALESIONAL; INTRAMUSCULAR; INTRAVENOUS; SOFT TISSUE at 04:01

## 2023-01-12 NOTE — ED PROVIDER NOTES
Encounter Date: 1/12/2023    SCRIBE #1 NOTE: I, Justo Gutiérrez, am scribing for, and in the presence of,  Sara Vargas MD. I have scribed the following portions of the note - Other sections scribed: HPI, ROS, PE.     History     Chief Complaint   Patient presents with    throat pain     C/O throat pain. Seen yesterday at [facility], had negative COVID/FLU/Strep. C/O continued pain.      32 year old male presents to ED c/o sore throat onset 4-5 days ago. Pt pt reports pain has gradually worsened to the point that it hurts to speak and swallow. Pt reports that the pain travels down his throat. Pt reports that his uvula is irritated. Pt reports difficulty breathing and cough. Pt denies fever and sick contacts. Pt reports cough onset just after sore throat. Per medical record pt tested negative for strep at [facility] yesterday. Pt reports that he has no relief of pain from cough drops.     The history is provided by the patient. The history is limited by a language barrier. A  was used.   Sore Throat   This is a new problem. The sore throat symptoms include difficulty swallowing and sore throat.Illness onset: 4-5 days ago. The problem has been gradually worsening. There has been no fever. Associated symptoms include coughing, shortness of breath and trouble swallowing. Pertinent negatives include no abdominal pain, congestion, diarrhea, ear pain, headaches, stridor or vomiting. He has had no exposure to strep. Treatments tried: cough drops. The treatment provided no relief.   Review of patient's allergies indicates:  No Known Allergies  Past Medical History:   Diagnosis Date    Deaf     Migraines     Seizures      Past Surgical History:   Procedure Laterality Date    IMPLANTATION OF COCHLEAR PROSTHESIS      INNER EAR SURGERY Right      Family History   Problem Relation Age of Onset    Hypertension Mother      Social History     Tobacco Use    Smoking status: Heavy Smoker     Packs/day: 0.50     Types:  Cigarettes    Smokeless tobacco: Never   Substance Use Topics    Alcohol use: Not Currently    Drug use: Never     Review of Systems   Constitutional:  Negative for chills, diaphoresis and fever.   HENT:  Positive for sore throat, trouble swallowing and voice change. Negative for congestion, ear pain and sinus pain.    Eyes:  Negative for pain, discharge and visual disturbance.   Respiratory:  Positive for cough and shortness of breath. Negative for wheezing and stridor.    Cardiovascular:  Negative for chest pain and palpitations.   Gastrointestinal:  Negative for abdominal pain, constipation, diarrhea, nausea, rectal pain and vomiting.   Genitourinary:  Negative for dysuria and hematuria.   Musculoskeletal:  Negative for back pain and myalgias.   Skin:  Negative for rash.   Neurological:  Negative for dizziness, syncope, numbness and headaches.   Hematological: Negative.    Psychiatric/Behavioral: Negative.     All other systems reviewed and are negative.    Physical Exam     Initial Vitals [01/12/23 0255]   BP Pulse Resp Temp SpO2   (!) 136/90 94 16 97.7 °F (36.5 °C) 97 %      MAP       --         Physical Exam    Nursing note and vitals reviewed.  Constitutional: He appears well-developed and well-nourished. He is not diaphoretic. No distress.   HENT:   Head: Normocephalic and atraumatic.   Nose: Nose normal.   Mouth/Throat: Posterior oropharyngeal erythema present.   Tonsillar edema no exudate, poor dentition   Eyes: Conjunctivae and EOM are normal. Pupils are equal, round, and reactive to light.   Neck: Trachea normal. Neck supple.   No neck swelling, anterior cervical tenderness, nothing over trachea   Normal range of motion.  Cardiovascular:  Normal rate, regular rhythm, normal heart sounds and intact distal pulses.     Exam reveals no gallop and no friction rub.       No murmur heard.  Pulmonary/Chest: Breath sounds normal. No respiratory distress. He has no wheezes. He has no rhonchi. He has no rales. He  exhibits no tenderness.   No stridor, lungs clear   Abdominal: Abdomen is soft. Bowel sounds are normal. He exhibits no distension and no mass. There is no abdominal tenderness. There is no rebound.   Musculoskeletal:         General: No tenderness or edema. Normal range of motion.      Cervical back: Normal range of motion and neck supple.      Lumbar back: Normal. No tenderness. Normal range of motion.     Neurological: He is alert and oriented to person, place, and time. He has normal strength. No cranial nerve deficit or sensory deficit.   Skin: Skin is warm and dry. Capillary refill takes less than 2 seconds. No rash and no abscess noted. No erythema. No pallor.   Psychiatric: He has a normal mood and affect. His behavior is normal. Judgment and thought content normal.       ED Course   Procedures  Labs Reviewed   COMPREHENSIVE METABOLIC PANEL - Abnormal; Notable for the following components:       Result Value    Albumin/Globulin Ratio 1.0 (*)     All other components within normal limits   C-REACTIVE PROTEIN - Abnormal; Notable for the following components:    C-Reactive Protein 27.60 (*)     All other components within normal limits   SEDIMENTATION RATE, AUTOMATED - Abnormal; Notable for the following components:    Sed Rate 24 (*)     All other components within normal limits   CBC WITH DIFFERENTIAL - Abnormal; Notable for the following components:    RBC 3.73 (*)     Hgb 11.7 (*)     Hct 34.8 (*)     All other components within normal limits   CBC W/ AUTO DIFFERENTIAL    Narrative:     The following orders were created for panel order CBC auto differential.  Procedure                               Abnormality         Status                     ---------                               -----------         ------                     CBC with Differential[[id number]]        Abnormal            Final result                 Please view results for these tests on the individual orders.          Imaging Results               CT Soft Tissue Neck With Contrast (Preliminary result)  Result time 01/12/23 06:10:21      Preliminary result by Michael Dumont Jr., MD (01/12/23 06:10:21)                   Narrative:    START OF REPORT:  TECHNIQUE: CT OF THE SOFT TISSUES OF THE NECK WAS PERFORMED WITH INTRAVENOUS CONTRAST.    COMPARISON: NONE.    CLINICAL HISTORY: SOFT TISSUE SWELLING, INFECTION SUSPECTED.    Findings: There are beam hardening artefacts from the right cochlear implant.  Sinuses: There is mucopariostal thickening in the bilateral maxillary sinuses, ethmoidal air cells and frontal sinuses.  Nasopharynx: Unremarkable.  Oropharynx: Unremarkable. Mild tonsilar promnence without suggestion of abscess.  Larynx: Unremarkable.  Trachea: Unremarkable.  Esophagus: Unremarkable.  Vascular structures: Unremarkable.  Thyroid glands: Unremarkable.  Lymph nodes: No size significant cervical lymphadenopathy.    Bony structures:  Alignment: No listhesis identified.  Mineralization of the Cervical Spine Bony Structures: Normal.  Curvature: S-shaped cervical scoliosis is identified levoconvex superiorly and dextroconvex inferiorly.  Fractures: None.    Degenerative changes: No significant degenerative changes are seen.    Miscellaneous:  Orthopedic Hardware: None.      Impression:  1. Mild tonsilar promnence without suggestion of abscess.  2. No pharyngeal or airway mass or obstruction identified. Details and other findings as described above.                          Preliminary result by Exhibition A, Rad Results In (01/12/23 06:10:21)                   Narrative:    START OF REPORT:  TECHNIQUE: CT OF THE SOFT TISSUES OF THE NECK WAS PERFORMED WITH INTRAVENOUS CONTRAST.    COMPARISON: NONE.    CLINICAL HISTORY: SOFT TISSUE SWELLING, INFECTION SUSPECTED.    Findings: There are beam hardening artefacts from the right cochlear implant.  Sinuses: There is mucopariostal thickening in the bilateral maxillary sinuses, ethmoidal air cells  and frontal sinuses.  Nasopharynx: Unremarkable.  Oropharynx: Unremarkable. Mild tonsilar promnence without suggestion of abscess.  Larynx: Unremarkable.  Trachea: Unremarkable.  Esophagus: Unremarkable.  Vascular structures: Unremarkable.  Thyroid glands: Unremarkable.  Lymph nodes: No size significant cervical lymphadenopathy.    Bony structures:  Alignment: No listhesis identified.  Mineralization of the Cervical Spine Bony Structures: Normal.  Curvature: S-shaped cervical scoliosis is identified levoconvex superiorly and dextroconvex inferiorly.  Fractures: None.    Degenerative changes: No significant degenerative changes are seen.    Miscellaneous:  Orthopedic Hardware: None.      Impression:  1. Mild tonsilar promnence without suggestion of abscess.  2. No pharyngeal or airway mass or obstruction identified. Details and other findings as described above.                                       Medical Decision Making  Problems Addressed:  Acute bacterial pharyngitis: acute illness or injury that poses a threat to life or bodily functions  Acute cough: acute illness or injury    Amount and/or Complexity of Data Reviewed  External Data Reviewed: labs and notes.  Labs: ordered.  Radiology: ordered and independent interpretation performed.    Risk  Prescription drug management.        Medications   dexAMETHasone injection 8 mg (8 mg Intravenous Given 1/12/23 0413)   ketorolac injection 15 mg (15 mg Intravenous Given 1/12/23 0410)   lactated ringers bolus 1,000 mL (1,000 mLs Intravenous New Bag 1/12/23 0413)   iopamidoL (ISOVUE-370) injection 100 mL (100 mLs Intravenous Given 1/12/23 0512)     Medical Decision Making:   History:   Old Medical Records: I decided to obtain old medical records.  Old Records Summarized: records from another hospital.       <> Summary of Records: Strep screen negative  Initial Assessment:   Sore throat for 5 days  Differential Diagnosis:   Pharyngitis, peritonsillar abscess,  retropharyngeal abscess, uvulitis, viral URI  Clinical Tests:   Lab Tests: Ordered and Reviewed  The following lab test(s) were unremarkable: CBC and CMP  Radiological Study: Ordered and Reviewed  ED Management:  Signs and symptoms are most consistent with pharyngitis however patient is endorsing pain with breathing difficulty breathing, pain with swallowing therefore CT ordered to rule out deep infection which was negative.  CBC, chemistry and ESR and CRP ordered and reviewed noted for elevated inflammatory markers.  He has no stridor or hypoxia and is remained hemodynamically stable throughout his entire ED visit and was resting comfortably after medication.  Discussed outpatient management and return precautions and he voiced understanding and agrees is comfortable with plan.  He had a strep screen performed yesterday that was negative therefore no need to repeat this at this time        Scribe Attestation:   Scribe #1: I performed the above scribed service and the documentation accurately describes the services I performed. I attest to the accuracy of the note.  Comments: Attending:   Physician Attestation Statement for Scribe #1: ISara MD, personally performed the services described in this documentation. All medical record entries made by the scribe were at my direction and in my presence.  I have reviewed the chart and agree that the record reflects my personal performance and is accurate and complete.        Attending Attestation:           Physician Attestation for Scribe:  Physician Attestation Statement for Scribe #1: Sara HEALY MD, reviewed documentation, as scribed by Justo Gutiérrez in my presence, and it is both accurate and complete.           ED Course as of 01/12/23 0617   Thu Jan 12, 2023   0600 Resting comfortably, discussed results and plan and he voiced understanding and agrees and is comfortable with plan [BS]   0603 Used  for asl with all interactions  [BS]      ED Course User Index  [BS] Sara Vargas MD                 Clinical Impression:   Final diagnoses:  [J02.8, B96.89] Acute bacterial pharyngitis (Primary)  [R05.1] Acute cough        ED Disposition Condition    Discharge Stable          ED Prescriptions       Medication Sig Dispense Start Date End Date Auth. Provider    dexAMETHasone (DECADRON) 4 MG Tab Take 1 tablet (4 mg total) by mouth every 12 (twelve) hours. for 5 days 10 tablet 1/12/2023 1/17/2023 Sraa Vargas MD    clindamycin (CLEOCIN) 300 MG capsule Take 1 capsule (300 mg total) by mouth every 8 (eight) hours. for 10 days 30 capsule 1/12/2023 1/22/2023 Sara Vargas MD    famotidine (PEPCID) 20 MG tablet Take 1 tablet (20 mg total) by mouth every evening. for 10 days 10 tablet 1/12/2023 1/22/2023 Sara Vargas MD    ibuprofen (ADVIL,MOTRIN) 800 MG tablet Take 1 tablet (800 mg total) by mouth every 6 (six) hours as needed for Pain (take with food for pain). 20 tablet 1/12/2023 -- Sara Vargas MD          Follow-up Information       Follow up With Specialties Details Why Contact Info    your primary care provider  Schedule an appointment as soon as possible for a visit       Josafat Yates Jr., MD Otolaryngology Schedule an appointment as soon as possible for a visit   [address] [facility] [address]  [phone]      [facility] - Emergency Dept Emergency Medicine  As needed, If symptoms worsen [address] [facility] [id number] [phone]             Sara Vargas MD  01/12/23 0657

## 2023-01-16 ENCOUNTER — HOSPITAL ENCOUNTER (EMERGENCY)
Facility: HOSPITAL | Age: 33
Discharge: HOME | End: 2023-01-16
Attending: FAMILY MEDICINE
Payer: COMMERCIAL

## 2023-01-16 VITALS
TEMPERATURE: 98 F | SYSTOLIC BLOOD PRESSURE: 148 MMHG | RESPIRATION RATE: 18 BRPM | HEART RATE: 98 BPM | DIASTOLIC BLOOD PRESSURE: 93 MMHG | OXYGEN SATURATION: 98 %

## 2023-01-16 DIAGNOSIS — T23.211A: Primary | ICD-10-CM

## 2023-01-16 PROCEDURE — 25000003 PHARM REV CODE 250: Performed by: FAMILY MEDICINE

## 2023-01-16 PROCEDURE — 99283 EMERGENCY DEPT VISIT LOW MDM: CPT

## 2023-01-16 RX ORDER — BACITRACIN ZINC 500 UNIT/G
OINTMENT (GRAM) TOPICAL 3 TIMES DAILY
Qty: 30 G | Refills: 0 | Status: SHIPPED | OUTPATIENT
Start: 2023-01-16 | End: 2023-01-26

## 2023-01-16 RX ORDER — IBUPROFEN 400 MG/1
800 TABLET ORAL
Status: COMPLETED | OUTPATIENT
Start: 2023-01-16 | End: 2023-01-16

## 2023-01-16 RX ADMIN — IBUPROFEN 800 MG: 400 TABLET, FILM COATED ORAL at 02:01

## 2023-01-16 RX ADMIN — BACITRACIN ZINC, NEOMYCIN, POLYMYXIN B SULFAT 1 EACH: 5000; 3.5; 4 OINTMENT TOPICAL at 02:01

## 2023-01-16 NOTE — ED PROVIDER NOTES
Encounter Date: 1/16/2023       History     Chief Complaint   Patient presents with    Burn     Pt c/o right thumb and 1st digit burn from hot water while at work approx 01am.      32-year-old gentleman presents emergency room with complaints of a burn to the right thumb and 1st digit from hot water while at work.    The history is provided by the patient.   Review of patient's allergies indicates:  No Known Allergies  Past Medical History:   Diagnosis Date    Deaf     Migraines     Seizures      Past Surgical History:   Procedure Laterality Date    IMPLANTATION OF COCHLEAR PROSTHESIS      INNER EAR SURGERY Right      Family History   Problem Relation Age of Onset    Hypertension Mother      Social History     Tobacco Use    Smoking status: Heavy Smoker     Packs/day: 0.50     Types: Cigarettes    Smokeless tobacco: Never   Substance Use Topics    Alcohol use: Not Currently    Drug use: Never     Review of Systems   Constitutional:  Negative for chills, fatigue and fever.   HENT:  Negative for ear pain, rhinorrhea and sore throat.    Eyes:  Negative for photophobia and pain.   Respiratory:  Negative for cough, shortness of breath and wheezing.    Cardiovascular:  Negative for chest pain.   Gastrointestinal:  Negative for abdominal pain, diarrhea, nausea and vomiting.   Genitourinary:  Negative for dysuria.   Neurological:  Negative for dizziness, weakness and headaches.   All other systems reviewed and are negative.    Physical Exam     Initial Vitals [01/16/23 0212]   BP Pulse Resp Temp SpO2   (!) 151/103 92 17 98.2 °F (36.8 °C) 99 %      MAP       --         Physical Exam    Nursing note and vitals reviewed.  Constitutional: He appears well-developed and well-nourished.   HENT:   Head: Normocephalic and atraumatic.   Eyes: EOM are normal. Pupils are equal, round, and reactive to light.   Neck: Neck supple.   Normal range of motion.  Cardiovascular:  Normal rate, regular rhythm and normal heart sounds.     Exam  reveals no gallop and no friction rub.       No murmur heard.  Pulmonary/Chest: Breath sounds normal. No respiratory distress.   Abdominal: Abdomen is soft. Bowel sounds are normal. He exhibits no distension. There is no abdominal tenderness.   Musculoskeletal:         General: Normal range of motion.      Cervical back: Normal range of motion and neck supple.     Neurological: He is alert and oriented to person, place, and time. He has normal strength.   Skin: Skin is warm and dry. Capillary refill takes less than 2 seconds.   1 cm x 1 cm blister noted to the left palmar thumb on the distal phalanx.  No surrounding erythema.  Mild tenderness to palpation.   Psychiatric: He has a normal mood and affect. His behavior is normal. Judgment and thought content normal.       ED Course   Procedures  Labs Reviewed - No data to display       Imaging Results    None          Medications   neomycin-bacitracnZn-polymyxnB packet (has no administration in time range)   ibuprofen tablet 800 mg (has no administration in time range)     Medical Decision Making:   Initial Assessment:   Patient has a likely partial thickness burn to the right thumb which is very minimal with some blistering noted.  No significant tenderness or erythema noted to the right index finger.  Discussed with the patient regarding treatment.  Patient previously prescribed ibuprofen when seen in the emergency room 4 days prior.  Patient can use this to help with the pain.  Stable for discharge to home.                        Clinical Impression:   Final diagnoses:  [T23.211A] Partial thickness burn of thumb, right, initial encounter (Primary)        ED Disposition Condition    Discharge Stable          ED Prescriptions       Medication Sig Dispense Start Date End Date Auth. Provider    bacitracin 500 unit/gram Oint Apply topically 3 (three) times daily. for 10 days 30 g 1/16/2023 1/26/2023 Vineet Hogan MD          Follow-up Information       Follow up  With Specialties Details Why Contact Info    [facility] - Emergency Dept Emergency Medicine  As needed, If symptoms worsen [address] [facility] [address] [phone]    Primary Care Physician  In 5 days               Vineet Hogan MD  01/16/23 5384

## 2023-01-16 NOTE — Clinical Note
"Chun Patel" Amado was seen and treated in our emergency department on 1/16/2023.  He may return to work on 01/17/2023.       If you have any questions or concerns, please don't hesitate to call.      PAULINE Zaragoza RN    "

## 2023-01-16 NOTE — ED NOTES
Pt given discharge instructions. Pt instructed to follow up with pcp and return to er if any complications. Pt stated understanding. Pt right thumb clean, triple abx applied with gauze and secured with coban.

## 2023-01-25 ENCOUNTER — HOSPITAL ENCOUNTER (EMERGENCY)
Facility: HOSPITAL | Age: 33
Discharge: HOME | End: 2023-01-25
Attending: INTERNAL MEDICINE
Payer: COMMERCIAL

## 2023-01-25 VITALS
OXYGEN SATURATION: 100 % | HEART RATE: 105 BPM | RESPIRATION RATE: 20 BRPM | SYSTOLIC BLOOD PRESSURE: 141 MMHG | DIASTOLIC BLOOD PRESSURE: 98 MMHG | TEMPERATURE: 99 F

## 2023-01-25 DIAGNOSIS — S61.211A LACERATION OF LEFT INDEX FINGER WITHOUT FOREIGN BODY WITHOUT DAMAGE TO NAIL, INITIAL ENCOUNTER: Primary | ICD-10-CM

## 2023-01-25 PROCEDURE — 99283 EMERGENCY DEPT VISIT LOW MDM: CPT | Mod: 25

## 2023-01-25 PROCEDURE — 12001 RPR S/N/AX/GEN/TRNK 2.5CM/<: CPT

## 2023-01-26 NOTE — ED PROVIDER NOTES
Encounter Date: 1/25/2023       History     Chief Complaint   Patient presents with    Laceration     Cut   index  finger  on  the  left  hand     Chun Fischer is a 32 y.o. male with a history of migraines, seizures, and deafness s/p cochlear prosthesis who presents to the ED for evaluation of a laceration to the left index finger. Patient reports trying to staple a bag of ice closed when he accidentally stapled his finger. He has a small laceration to the dorsal aspect of his finger and reports pain. He believes he is UTD on tetanus. He denies numbness/tingling, decreased ROM, hand pain.     The history is provided by the patient. No  was used.   Review of patient's allergies indicates:  No Known Allergies  Past Medical History:   Diagnosis Date    Deaf     Migraines     Seizures      Past Surgical History:   Procedure Laterality Date    IMPLANTATION OF COCHLEAR PROSTHESIS      INNER EAR SURGERY Right      Family History   Problem Relation Age of Onset    Hypertension Mother      Social History     Tobacco Use    Smoking status: Heavy Smoker     Packs/day: 0.50     Types: Cigarettes    Smokeless tobacco: Never   Substance Use Topics    Alcohol use: Not Currently    Drug use: Never     Review of Systems   Constitutional:  Negative for activity change, chills and fever.   HENT:  Negative for trouble swallowing.    Eyes:  Negative for photophobia and visual disturbance.   Respiratory:  Negative for chest tightness, shortness of breath and wheezing.    Cardiovascular:  Negative for chest pain, palpitations and leg swelling.   Gastrointestinal:  Negative for abdominal pain, constipation, diarrhea, nausea and vomiting.   Genitourinary:  Negative for dysuria, frequency, hematuria and urgency.   Musculoskeletal:  Negative for arthralgias, back pain and gait problem.   Skin:  Positive for wound. Negative for color change and rash.   Neurological:  Negative for dizziness, syncope, weakness,  light-headedness, numbness and headaches.   Psychiatric/Behavioral:  Negative for agitation and confusion. The patient is not nervous/anxious.      Physical Exam     Initial Vitals [01/25/23 2037]   BP Pulse Resp Temp SpO2   (!) 141/98 105 20 98.6 °F (37 °C) 100 %      MAP       --         Physical Exam    Nursing note and vitals reviewed.  Constitutional: He appears well-developed and well-nourished. No distress.   HENT:   Head: Normocephalic and atraumatic.   Mouth/Throat: No oropharyngeal exudate.   Eyes: EOM are normal. No scleral icterus.   Neck: Neck supple.   Normal range of motion.  Cardiovascular:  Normal rate and regular rhythm.           No murmur heard.  Pulmonary/Chest: No respiratory distress. He has no wheezes.   Abdominal: Abdomen is soft. He exhibits no distension. There is no abdominal tenderness.   Musculoskeletal:         General: No edema. Normal range of motion.      Cervical back: Normal range of motion and neck supple.     Neurological: He is alert and oriented to person, place, and time. No cranial nerve deficit.   Skin: Skin is warm and dry. Capillary refill takes less than 2 seconds. No erythema.   .5 cm puncture wound to palmar aspect of left index finger. Bleeding controlled. No foreign body visualized. NVI. Full ROM.   Psychiatric: He has a normal mood and affect. Thought content normal.         ED Course   Lac Repair    Date/Time: 1/25/2023 10:11 PM  Performed by: Cindy Love PA-C  Authorized by: Cidny Love PA-C     Consent:     Consent obtained:  Verbal    Consent given by:  Patient    Risks, benefits, and alternatives were discussed: yes      Risks discussed:  Infection, retained foreign body and pain  Universal protocol:     Patient identity confirmed:  Verbally with patient and arm band  Anesthesia:     Anesthesia method:  None  Laceration details:     Location:  Finger    Finger location:  L index finger    Length (cm):  0.5  Pre-procedure details:      Preparation:  Imaging obtained to evaluate for foreign bodies  Exploration:     Imaging obtained: x-ray      Imaging outcome: foreign body not noted      Wound exploration: wound explored through full range of motion and entire depth of wound visualized      Contaminated: no    Treatment:     Area cleansed with:  Povidone-iodine and saline    Amount of cleaning:  Standard    Irrigation solution:  Sterile saline    Irrigation method:  Syringe  Skin repair:     Repair method:  Tissue adhesive  Approximation:     Approximation:  Close  Repair type:     Repair type:  Simple  Post-procedure details:     Dressing:  Non-adherent dressing    Procedure completion:  Tolerated well, no immediate complications  Labs Reviewed - No data to display       Imaging Results              X-Ray Finger 2 or More Views Left (Final result)  Result time 01/25/23 21:58:34      Final result by Juanjose May MD (01/25/23 21:58:34)                   Narrative:    EXAMINATION  XR FINGER 2 OR MORE VIEWS LEFT    CLINICAL HISTORY  left index finger laceration, rule out foreign body;    TECHNIQUE  A total of 3 images submitted of the left finger(s).    COMPARISON  1 February 2022    FINDINGS  No displaced fracture or dislocation is identified. The visualized joint spaces are preserved. No aggressive osseous lesion or periosteal reaction is evident.    The included soft tissues are without acute abnormality.    IMPRESSION  No convincing acute radiographic abnormality.    ==========    No significant discrepancy identified in relation to the ED wet read.      Electronically signed by: Juanjose May  Date:    01/25/2023  Time:    21:58                      ED Interpretation by Cindy Love PA-C (01/25/23 21:49:28, [facility], Emergency Medicine)    No acute osseous abnormality or retained foreign body identified                                     Medications - No data to display  Medical Decision Making:   Clinical  Tests:   Radiological Study: Ordered and Reviewed  ED Management:  The patient is resting comfortably and in no acute distress.  He states that his symptoms have improved after treatment in Emergency Department. I personally discussed his test results and treatment plan.  Gave strict ED precautions and specific conditions for return to the emergency department and importance of follow up with pcp.  Patient voices understanding and agrees to the plan discussed. All patients' questions have been answered at this time. He has remained hemodynamically stable throughout entire stay in ED and is stable for discharge home.           ED Course as of 01/25/23 2217 Wed Jan 25, 2023 2103 Tdap received here in ED in feb 2022 [KD]      ED Course User Index  [KD] Cindy Love PA-C                 Clinical Impression:   Final diagnoses:  [S61.211A] Laceration of left index finger without foreign body without damage to nail, initial encounter (Primary)        ED Disposition Condition    Discharge Stable          ED Prescriptions    None       Follow-up Information       Follow up With Specialties Details Why Contact Info    [facility] Emergency Medicine  If symptoms worsen [address] [facility] [place] [address] [phone]    PCP  In 1 week Hospital follow up              Cindy Love PA-C  01/25/23 8602

## 2023-03-09 ENCOUNTER — HOSPITAL ENCOUNTER (EMERGENCY)
Facility: HOSPITAL | Age: 33
Discharge: HOME | End: 2023-03-09
Attending: INTERNAL MEDICINE
Payer: COMMERCIAL

## 2023-03-09 VITALS
OXYGEN SATURATION: 98 % | RESPIRATION RATE: 16 BRPM | BODY MASS INDEX: 25.11 KG/M2 | DIASTOLIC BLOOD PRESSURE: 84 MMHG | SYSTOLIC BLOOD PRESSURE: 122 MMHG | WEIGHT: 160 LBS | HEART RATE: 70 BPM | TEMPERATURE: 97 F | HEIGHT: 67 IN

## 2023-03-09 DIAGNOSIS — E86.0 DEHYDRATION AFTER EXERTION: Primary | ICD-10-CM

## 2023-03-09 LAB
ALBUMIN SERPL-MCNC: 3.8 G/DL (ref 3.5–5)
ALBUMIN/GLOB SERPL: 1.2 RATIO (ref 1.1–2)
ALP SERPL-CCNC: 83 UNIT/L (ref 40–150)
ALT SERPL-CCNC: 19 UNIT/L (ref 0–55)
AST SERPL-CCNC: 29 UNIT/L (ref 5–34)
BILIRUBIN DIRECT+TOT PNL SERPL-MCNC: 0.6 MG/DL
BUN SERPL-MCNC: 7.2 MG/DL (ref 8.9–20.6)
CALCIUM SERPL-MCNC: 9.2 MG/DL (ref 8.4–10.2)
CHLORIDE SERPL-SCNC: 104 MMOL/L (ref 98–107)
CO2 SERPL-SCNC: 29 MMOL/L (ref 22–29)
CREAT SERPL-MCNC: 0.96 MG/DL (ref 0.73–1.18)
GFR SERPLBLD CREATININE-BSD FMLA CKD-EPI: >60 MLS/MIN/1.73/M2
GLOBULIN SER-MCNC: 3.3 GM/DL (ref 2.4–3.5)
GLUCOSE SERPL-MCNC: 82 MG/DL (ref 74–100)
POTASSIUM SERPL-SCNC: 3.8 MMOL/L (ref 3.5–5.1)
PROT SERPL-MCNC: 7.1 GM/DL (ref 6.4–8.3)
SODIUM SERPL-SCNC: 141 MMOL/L (ref 136–145)

## 2023-03-09 PROCEDURE — 99283 EMERGENCY DEPT VISIT LOW MDM: CPT

## 2023-03-09 PROCEDURE — 80053 COMPREHEN METABOLIC PANEL: CPT | Performed by: INTERNAL MEDICINE

## 2023-03-10 NOTE — ED PROVIDER NOTES
Encounter Date: 3/9/2023       History     Chief Complaint   Patient presents with    Weakness     Generalized weakness and fatigue after walking all day and drinking Redbull x4     Presents with weakness after prolonged walking. States he was just drinking red bull (caffeine beverage)     The history is provided by the patient and the EMS personnel.   Review of patient's allergies indicates:  No Known Allergies  Past Medical History:   Diagnosis Date    Deaf     Migraines     Seizures      Past Surgical History:   Procedure Laterality Date    IMPLANTATION OF COCHLEAR PROSTHESIS      INNER EAR SURGERY Right      Family History   Problem Relation Age of Onset    Hypertension Mother      Social History     Tobacco Use    Smoking status: Heavy Smoker     Packs/day: 0.50     Types: Cigarettes    Smokeless tobacco: Never   Substance Use Topics    Alcohol use: Not Currently    Drug use: Never     Review of Systems   Constitutional:  Positive for fatigue. Negative for fever.   HENT:  Negative for sore throat.    Respiratory:  Negative for shortness of breath.    Cardiovascular:  Negative for chest pain.   Gastrointestinal:  Negative for nausea.   Genitourinary:  Negative for dysuria.   Musculoskeletal:  Negative for back pain.   Skin:  Negative for rash.   Neurological:  Positive for weakness.   Hematological:  Does not bruise/bleed easily.   All other systems reviewed and are negative.    Physical Exam     Initial Vitals [03/09/23 1910]   BP Pulse Resp Temp SpO2   (!) 137/96 65 20 97 °F (36.1 °C) 100 %      MAP       --         Physical Exam    Nursing note and vitals reviewed.  Constitutional: He appears well-developed and well-nourished.   HENT:   Head: Normocephalic and atraumatic.   Mouth/Throat: Oropharynx is clear and moist.   Eyes: Conjunctivae and EOM are normal. Pupils are equal, round, and reactive to light.   Neck: Neck supple.   Normal range of motion.  Cardiovascular:  Regular rhythm, normal heart sounds and  intact distal pulses.           Pulmonary/Chest: Breath sounds normal. No respiratory distress.   Abdominal: Abdomen is soft. Bowel sounds are normal. He exhibits no distension. There is no abdominal tenderness. There is no rebound and no guarding.   Musculoskeletal:         General: No edema. Normal range of motion.      Cervical back: Normal range of motion and neck supple.     Neurological: He is alert and oriented to person, place, and time. He has normal strength. GCS score is 15. GCS eye subscore is 4. GCS verbal subscore is 5. GCS motor subscore is 6.   Skin: Skin is warm and dry. No rash noted.   Psychiatric: His behavior is normal.       ED Course   Procedures  Labs Reviewed   COMPREHENSIVE METABOLIC PANEL - Abnormal; Notable for the following components:       Result Value    Blood Urea Nitrogen 7.2 (*)     All other components within normal limits   EXTRA TUBES    Narrative:     The following orders were created for panel order EXTRA TUBES.  Procedure                               Abnormality         Status                     ---------                               -----------         ------                     Red Top Hold[[id number]]                                     In process                 Lavender Top Hold[[id number]]                                In process                   Please view results for these tests on the individual orders.   RED TOP HOLD   LAVENDER TOP HOLD          Imaging Results    None          Medications - No data to display                           Clinical Impression:   Final diagnoses:  [E86.0] Dehydration after exertion (Primary)        ED Disposition Condition    Discharge Stable          ED Prescriptions    None       Follow-up Information       Follow up With Specialties Details Why Contact Info    [facility] Emergency Medicine  If symptoms worsen [address] [address] [phone]    [facility]   Schedule an appointment as soon as possible for a visit in 2 months  [address] [address]             Gonzalo Fernández MD  03/09/23 2016

## 2023-04-10 ENCOUNTER — HOSPITAL ENCOUNTER (EMERGENCY)
Facility: HOSPITAL | Age: 33
Discharge: LEFT BEFORE DISCHARGE | End: 2023-04-10
Attending: INTERNAL MEDICINE
Payer: COMMERCIAL

## 2023-04-10 VITALS
WEIGHT: 189.63 LBS | RESPIRATION RATE: 16 BRPM | HEIGHT: 69 IN | DIASTOLIC BLOOD PRESSURE: 92 MMHG | SYSTOLIC BLOOD PRESSURE: 135 MMHG | OXYGEN SATURATION: 99 % | HEART RATE: 92 BPM | TEMPERATURE: 98 F | BODY MASS INDEX: 28.08 KG/M2

## 2023-04-10 DIAGNOSIS — Z53.21 ELOPED FROM EMERGENCY DEPARTMENT: ICD-10-CM

## 2023-04-10 DIAGNOSIS — R53.83 FATIGUE, UNSPECIFIED TYPE: Primary | ICD-10-CM

## 2023-04-10 DIAGNOSIS — R00.2 PALPITATIONS: ICD-10-CM

## 2023-04-10 PROCEDURE — 99283 EMERGENCY DEPT VISIT LOW MDM: CPT

## 2023-04-10 PROCEDURE — 82962 GLUCOSE BLOOD TEST: CPT

## 2023-04-10 NOTE — ED PROVIDER NOTES
Encounter Date: 4/10/2023       History     Chief Complaint   Patient presents with    Fatigue     Tired/malaise since last night     31 yo M w/ PMHx significant for HTN, smoking, chronic pancreatitis, seizure disorder & migraines presents to ED c/o 1 day hx of feeling tired due to working too much. Reports intermittent palpitations which also began last night & HA today. Denies CP, SOB, diaphoresis, syncope, edema, vision changes, focal weakness, dizziness, ataxia, N/V, F/C, abdominal pain. VSS on arrival, patient in NAD.    Review of patient's allergies indicates:  No Known Allergies  Past Medical History:   Diagnosis Date    Deaf     Hypertension     Migraines     Seizures      Past Surgical History:   Procedure Laterality Date    IMPLANTATION OF COCHLEAR PROSTHESIS      INNER EAR SURGERY Right      Family History   Problem Relation Age of Onset    Hypertension Mother      Social History     Tobacco Use    Smoking status: Heavy Smoker     Types: Cigars    Smokeless tobacco: Never   Substance Use Topics    Alcohol use: Not Currently    Drug use: Never     Review of Systems   All other systems reviewed and are negative.    Physical Exam     Initial Vitals [04/10/23 1630]   BP Pulse Resp Temp SpO2   (!) 135/92 92 16 97.6 °F (36.4 °C) 99 %      MAP       --         Physical Exam    Nursing note and vitals reviewed.  Constitutional: He appears well-developed and well-nourished. He is not diaphoretic. No distress.   HENT:   Head: Normocephalic and atraumatic.   Mouth/Throat: No oropharyngeal exudate.   Eyes: Conjunctivae and EOM are normal. Pupils are equal, round, and reactive to light. No scleral icterus.   Neck: Neck supple. No thyromegaly present. No tracheal deviation present. No JVD present.   Normal range of motion.  Cardiovascular:  Normal rate, regular rhythm, normal heart sounds and intact distal pulses.     Exam reveals no gallop and no friction rub.       No murmur heard.  Pulmonary/Chest: Breath sounds  normal. No respiratory distress. He has no wheezes. He has no rhonchi. He has no rales.   Abdominal: Abdomen is soft. Bowel sounds are normal. He exhibits no distension. There is no abdominal tenderness. There is no rebound and no guarding.   Musculoskeletal:         General: No tenderness or edema. Normal range of motion.      Cervical back: Normal range of motion and neck supple.     Lymphadenopathy:     He has no cervical adenopathy.   Neurological: He is alert and oriented to person, place, and time. He has normal strength. He is not disoriented. He displays no tremor. No cranial nerve deficit or sensory deficit. He exhibits normal muscle tone. He displays no seizure activity. Coordination and gait normal. GCS score is 15. GCS eye subscore is 4. GCS verbal subscore is 5. GCS motor subscore is 6.   Skin: Skin is warm and dry. Capillary refill takes less than 2 seconds. No rash noted. No erythema. No pallor.   Psychiatric: He has a normal mood and affect.       ED Course   Procedures  Labs Reviewed   CBC W/ AUTO DIFFERENTIAL    Narrative:     The following orders were created for panel order CBC Auto Differential.  Procedure                               Abnormality         Status                     ---------                               -----------         ------                     CBC with Differential[[id number]]                                                         Please view results for these tests on the individual orders.   MAGNESIUM   TSH   BASIC METABOLIC PANEL   TROPONIN I   CBC WITH DIFFERENTIAL          Imaging Results    None          Medications - No data to display                           Clinical Impression:   Final diagnoses:  [R00.2] Palpitations  [R53.83] Fatigue, unspecified type (Primary)  [Z53.21] Eloped from emergency department        ED Disposition Condition    Eloped Stable                CHANDU Ferrer  04/10/23 8444

## 2023-04-10 NOTE — FIRST PROVIDER EVALUATION
"Medical screening examination initiated.  I have conducted a focused provider triage encounter, findings are as follows:    Brief history of present illness:  Chun Fischer is a 32 y.o. male with HTN and chronic pancreatitis who presents to the ED complaining of fatigue and generalized weakness x 1 day. He also reports headache. He denies fevers, chills, chest pain, SOB, abdominal pain, N/V.    Vitals:    04/10/23 1630   BP: (!) 135/92   Pulse: 92   Resp: 16   Temp: 97.6 °F (36.4 °C)   TempSrc: Oral   SpO2: 99%   Weight: 86 kg (189 lb 9.5 oz)   Height: 5' 9" (1.753 m)     Preliminary workup initiated; this workup will be continued and followed by the physician or advanced practice provider that is assigned to the patient when roomed.  "

## 2023-04-11 LAB — POCT GLUCOSE: 91 MG/DL (ref 70–110)

## 2023-04-13 ENCOUNTER — HOSPITAL ENCOUNTER (EMERGENCY)
Facility: HOSPITAL | Age: 33
Discharge: HOME | End: 2023-04-14
Attending: STUDENT IN AN ORGANIZED HEALTH CARE EDUCATION/TRAINING PROGRAM
Payer: COMMERCIAL

## 2023-04-13 DIAGNOSIS — R51.9 NONINTRACTABLE HEADACHE, UNSPECIFIED CHRONICITY PATTERN, UNSPECIFIED HEADACHE TYPE: Primary | ICD-10-CM

## 2023-04-13 DIAGNOSIS — Z96.21 HISTORY OF COCHLEAR IMPLANT: ICD-10-CM

## 2023-04-13 PROCEDURE — 0240U COVID/FLU A&B PCR: CPT | Performed by: PHYSICIAN ASSISTANT

## 2023-04-13 PROCEDURE — 99284 EMERGENCY DEPT VISIT MOD MDM: CPT

## 2023-04-13 NOTE — Clinical Note
"Chun Patel" Jessicadavidsourav was seen and treated in our emergency department on 4/13/2023.  He may return to work on 04/15/2023.       If you have any questions or concerns, please don't hesitate to call.       RN    "

## 2023-04-14 VITALS
BODY MASS INDEX: 32.58 KG/M2 | WEIGHT: 220 LBS | DIASTOLIC BLOOD PRESSURE: 84 MMHG | RESPIRATION RATE: 18 BRPM | OXYGEN SATURATION: 99 % | TEMPERATURE: 98 F | SYSTOLIC BLOOD PRESSURE: 138 MMHG | HEIGHT: 69 IN | HEART RATE: 79 BPM

## 2023-04-14 PROCEDURE — 25000003 PHARM REV CODE 250: Performed by: STUDENT IN AN ORGANIZED HEALTH CARE EDUCATION/TRAINING PROGRAM

## 2023-04-14 RX ORDER — BUTALBITAL, ACETAMINOPHEN AND CAFFEINE 50; 325; 40 MG/1; MG/1; MG/1
1 TABLET ORAL
Status: COMPLETED | OUTPATIENT
Start: 2023-04-14 | End: 2023-04-14

## 2023-04-14 RX ADMIN — BUTALBITAL, ACETAMINOPHEN, AND CAFFEINE 1 TABLET: 50; 325; 40 TABLET ORAL at 01:04

## 2023-04-14 NOTE — ED PROVIDER NOTES
Encounter Date: 4/13/2023    SCRIBE #1 NOTE: I, Tobias Thompson, am scribing for, and in the presence of,  Mayur Torres MD. I have scribed the following portions of the note - Other sections scribed: HPI,ROS,PE.     History     Chief Complaint   Patient presents with    Headache    ear itchiness     Pt c/o headache, R ear pain, discharge and itchiness since yesterday unrelieved by aspirin. Pt had a R cochlear implant when he was 15, but recently got new hardware and symptoms started then.      33 y/o male with a PMHx of HTN, migraines, seizures, and deaf presents to ED c/o VARNER, R. Ear pain, itchiness, and drainage onset yesterday. Pt reports a R. Cochlear implant when he was 15. He recently got new hardware and symptoms started. Pt reported he rubbed some alcohol on the affected area with no relief. Pt father states he takes tylenol before bed.     The history is provided by the patient and a parent. No  was used.   Headache   This is a new problem. The current episode started yesterday. Associated symptoms include ear pain. His past medical history is significant for hypertension and migraine headaches.   Review of patient's allergies indicates:  No Known Allergies  Past Medical History:   Diagnosis Date    Deaf     Hypertension     Migraines     Seizures      Past Surgical History:   Procedure Laterality Date    ABDOMINAL SURGERY      IMPLANTATION OF COCHLEAR PROSTHESIS      INNER EAR SURGERY Right      Family History   Problem Relation Age of Onset    Hypertension Mother      Social History     Tobacco Use    Smoking status: Heavy Smoker     Types: Cigars    Smokeless tobacco: Never   Substance Use Topics    Alcohol use: Not Currently    Drug use: Never     Review of Systems   HENT:  Positive for ear discharge and ear pain.         Itchiness behind R. Ear.   Neurological:  Positive for headaches.     Physical Exam     Initial Vitals [04/13/23 1754]   BP Pulse Resp Temp SpO2   116/77 72 18  97.6 °F (36.4 °C) 97 %      MAP       --         Physical Exam    Constitutional: He appears well-developed and well-nourished. He is not diaphoretic. No distress.   Hard of hearing    HENT:   Head: Normocephalic and atraumatic.   Right Ear: External ear normal.   Left Ear: External ear normal.   Nose: Nose normal.   TM clear with no drainage.    Eyes: EOM are normal. Pupils are equal, round, and reactive to light. Right eye exhibits no discharge. Left eye exhibits no discharge.   Cardiovascular:  Normal rate, regular rhythm and normal heart sounds.     Exam reveals no gallop and no friction rub.       No murmur heard.  Pulmonary/Chest: Effort normal and breath sounds normal. No respiratory distress. He has no wheezes. He has no rhonchi. He has no rales. He exhibits no tenderness.   Abdominal: Abdomen is soft. Bowel sounds are normal. He exhibits no distension and no mass. There is no abdominal tenderness. There is no rebound and no guarding.   Musculoskeletal:         General: Tenderness present. No edema. Normal range of motion.      Comments: Tenderness with palpation to R. Mastoid process.     Neurological: He is alert and oriented to person, place, and time. No cranial nerve deficit or sensory deficit.   Skin: Skin is warm and dry. Capillary refill takes less than 2 seconds.   Surgical scar R. Side of head.        ED Course   Procedures  Labs Reviewed   COVID/FLU A&B PCR - Normal    Narrative:     The Xpert Xpress SARS-CoV-2/FLU/RSV plus is a rapid, multiplexed real-time PCR test intended for the simultaneous qualitative detection and differentiation of SARS-CoV-2, Influenza A, Influenza B, and respiratory syncytial virus (RSV) viral RNA in either nasopharyngeal swab or nasal swab specimens.                Imaging Results              CT Head Without Contrast (Preliminary result)  Result time 04/14/23 00:18:10      Preliminary result by Michael Dumont Jr., MD (04/14/23 00:18:10)                    Narrative:    START OF REPORT:  TECHNIQUE: CT OF THE HEAD WAS PERFORMED WITHOUT INTRAVENOUS CONTRAST WITH AXIAL AS WELL AS CORONAL AND SAGITTAL IMAGES.    COMPARISON: COMPARISON IS WITH STUDY DATED.    DOSAGE INFORMATION: AUTOMATED EXPOSURE CONTROL WAS UTILIZED.    CLINICAL HISTORY: HEADACHE.    Findings:  Artifact: There is severe streak artifact from the right sided cochlear implant on many of the images which decreases sensitivity and specificity of the study for subtle findings.  Hemorrhage: No acute intracranial hemorrhage is seen.  CSF spaces: The ventricles sulci and basal cisterns are within normal limits.  Brain parenchyma: There is preservation of the grey white junction throughout.  Cerebellum: Unremarkable.  Sella and skull base: The sella appears to be within normal limits for age.  Intracranial calcifications: Incidental note is made of some pineal region calcification.  Calvarium: No acute linear or depressed skull fracture is seen.    Maxillofacial Structures:  Paranasal sinuses: The visualized paranasal sinuses appear clear with no significant mucoperiosteal thickening or air fluid levels identified.  Orbits: The orbits appear unremarkable.  TMJ: The mandibular condyles appear normally placed with respect to the mandibular fossa.      Impression:  1. No acute intracranial process identified. Details and findings as noted above.                          Preliminary result by Interface, Rad Results In (04/14/23 00:18:10)                   Narrative:    START OF REPORT:  TECHNIQUE: CT OF THE HEAD WAS PERFORMED WITHOUT INTRAVENOUS CONTRAST WITH AXIAL AS WELL AS CORONAL AND SAGITTAL IMAGES.    COMPARISON: COMPARISON IS WITH STUDY DATED.    DOSAGE INFORMATION: AUTOMATED EXPOSURE CONTROL WAS UTILIZED.    CLINICAL HISTORY: HEADACHE.    Findings:  Artifact: There is severe streak artifact from the right sided cochlear implant on many of the images which decreases sensitivity and specificity of the study for  subtle findings.  Hemorrhage: No acute intracranial hemorrhage is seen.  CSF spaces: The ventricles sulci and basal cisterns are within normal limits.  Brain parenchyma: There is preservation of the grey white junction throughout.  Cerebellum: Unremarkable.  Sella and skull base: The sella appears to be within normal limits for age.  Intracranial calcifications: Incidental note is made of some pineal region calcification.  Calvarium: No acute linear or depressed skull fracture is seen.    Maxillofacial Structures:  Paranasal sinuses: The visualized paranasal sinuses appear clear with no significant mucoperiosteal thickening or air fluid levels identified.  Orbits: The orbits appear unremarkable.  TMJ: The mandibular condyles appear normally placed with respect to the mandibular fossa.      Impression:  1. No acute intracranial process identified. Details and findings as noted above.                                         Medications   butalbital-acetaminophen-caffeine -40 mg per tablet 1 tablet (1 tablet Oral Given 4/14/23 0100)         Medical Decision Making  Patient's since with right ear pain, headache, started earlier today.    Differential diagnoses includes but is not limited to otitis media, otitis externa, cochlear implant complication, mastoiditis, tension headache, migraine    Patient is awake alert he is well-appearing.  Both patient and father are poor historians.  Best I can understand last time had any procedures done the cochlear implant was actually whenever patient was 15.  Denies any new hardware here in the emergency department.  Complains of drainage who is here but I do not see any.  His TMs clear, he does have some mild erythema over his right mastoid process however he reports to rubbing alcohol there to help with the pain from his cochlear implant piece.  CT scan does not reveal any effusion of the mastoid, low suspicion for mastoiditis at this time.  His ears not bulging.  It is not  red.  I believe that this time he is suitable for discharge home especially since he feels much better after some Fioricet here in the emergency department for his headache.  No focal neuro deficits nontoxic appearing afebrile.  Discussed findings with both patient and father in room.  They are amenable to plan. Return precautions given.  Questions invited, questions answered to the best my ability.  Patient discharged home condition stable.      Amount and/or Complexity of Data Reviewed  Independent Historian: parent     Details: Pt father states he takes tylenol before bed.  External Data Reviewed: labs and notes.     Details: Chart review reveals note from outside hospital from 04/10/2023.  Patient also complained about a headache at that time and feeling tired after working too much.  Workup was largely unremarkable and patient was discharged home.  Labs: ordered. Decision-making details documented in ED Course.  Radiology: ordered and independent interpretation performed. Decision-making details documented in ED Course.          Scribe Attestation:   Scribe #1: I performed the above scribed service and the documentation accurately describes the services I performed. I attest to the accuracy of the note.    Attending Attestation:           Physician Attestation for Scribe:  Physician Attestation Statement for Scribe #1: I, Mayur Torres MD, reviewed documentation, as scribed by Tobias Thompson in my presence, and it is both accurate and complete.           ED Course as of 04/14/23 0130   Fri Apr 14, 2023   0119 Patient reports feeling much better after Fioricet here in the emergency department.  Comfortable with discharge home. [MM]   0120 COVID/FLU A&B PCR  Flu and COVID negative. [MM]   0120 CT Head Without Contrast  No evidence of any mastoiditis on CT. [MM]      ED Course User Index  [MM] Mayur Torres MD                 Clinical Impression:   Final diagnoses:  [R51.9] Nonintractable headache,  unspecified chronicity pattern, unspecified headache type (Primary)  [Z96.21] History of cochlear implant        ED Disposition Condition    Discharge Stable          ED Prescriptions    None       Follow-up Information       Follow up With Specialties Details Why Contact Info    [facility]  Call   [address] [address] [phone]               Mayur Torres MD  04/14/23 4636

## 2023-04-27 ENCOUNTER — HOSPITAL ENCOUNTER (EMERGENCY)
Facility: HOSPITAL | Age: 33
Discharge: HOME | End: 2023-04-27
Attending: FAMILY MEDICINE
Payer: COMMERCIAL

## 2023-04-27 VITALS
RESPIRATION RATE: 20 BRPM | TEMPERATURE: 97 F | OXYGEN SATURATION: 98 % | BODY MASS INDEX: 35.36 KG/M2 | SYSTOLIC BLOOD PRESSURE: 135 MMHG | HEIGHT: 66 IN | WEIGHT: 220 LBS | DIASTOLIC BLOOD PRESSURE: 82 MMHG | HEART RATE: 66 BPM

## 2023-04-27 DIAGNOSIS — L29.9 ITCHING OF EAR: Primary | ICD-10-CM

## 2023-04-27 DIAGNOSIS — H65.93 FLUID LEVEL BEHIND TYMPANIC MEMBRANE OF BOTH EARS: ICD-10-CM

## 2023-04-27 PROCEDURE — 99284 EMERGENCY DEPT VISIT MOD MDM: CPT

## 2023-04-27 PROCEDURE — 25000003 PHARM REV CODE 250: Performed by: PHYSICIAN ASSISTANT

## 2023-04-27 RX ORDER — DIPHENHYDRAMINE HCL 25 MG
25 CAPSULE ORAL
Status: COMPLETED | OUTPATIENT
Start: 2023-04-27 | End: 2023-04-27

## 2023-04-27 RX ORDER — CETIRIZINE HYDROCHLORIDE 10 MG/1
10 TABLET ORAL DAILY
Qty: 30 TABLET | Refills: 0 | OUTPATIENT
Start: 2023-04-27 | End: 2024-02-07

## 2023-04-27 RX ORDER — CLINDAMYCIN HYDROCHLORIDE 300 MG/1
300 CAPSULE ORAL EVERY 8 HOURS
Qty: 21 CAPSULE | Refills: 0 | Status: SHIPPED | OUTPATIENT
Start: 2023-04-27 | End: 2023-05-04

## 2023-04-27 RX ORDER — HYDROCORTISONE 25 MG/G
CREAM TOPICAL 2 TIMES DAILY
Qty: 28 G | Refills: 0 | Status: SHIPPED | OUTPATIENT
Start: 2023-04-27 | End: 2024-03-11

## 2023-04-27 RX ADMIN — DIPHENHYDRAMINE HYDROCHLORIDE 25 MG: 25 CAPSULE ORAL at 01:04

## 2023-04-27 NOTE — ED PROVIDER NOTES
Encounter Date: 4/27/2023       History     Chief Complaint   Patient presents with    Headache     Chronic headache and bilateral ear itching at workplace     31 yo M w/ PMHx significant for HTN, migraines, seizure disorder & deafness presents to ED c/o b/l itching to scalp posterior to ears. Patient denies otorrhea, otalgia, ear pressure, dizziness, mastoid tenderness/swelling, F/C, generalized weakness, fatigue, appetite changes. Patient has not tried to take anything for itching. Triage note reports patient is also c/o HA. Patient tells me that he does get intermittent HAs, as is his baseline, but denies any recent change in nature or severity of HAs & denies HA currently. Denies vision changes, photophobia, neck stiffness, speech changes, focal weakness, numbness, paralysis, ataxia, abnormal balance. VSS on arrival, patient in NAD.    Review of patient's allergies indicates:  No Known Allergies  Past Medical History:   Diagnosis Date    Deaf     Hypertension     Migraines     Seizures      Past Surgical History:   Procedure Laterality Date    ABDOMINAL SURGERY      IMPLANTATION OF COCHLEAR PROSTHESIS      INNER EAR SURGERY Right      Family History   Problem Relation Age of Onset    Hypertension Mother      Social History     Tobacco Use    Smoking status: Heavy Smoker     Types: Cigars    Smokeless tobacco: Never   Substance Use Topics    Alcohol use: Not Currently    Drug use: Never     Review of Systems   All other systems reviewed and are negative.    Physical Exam     Initial Vitals [04/27/23 0038]   BP Pulse Resp Temp SpO2   135/82 66 20 97.2 °F (36.2 °C) 98 %      MAP       --         Physical Exam    Nursing note and vitals reviewed.  Constitutional: He appears well-developed and well-nourished. He is not diaphoretic. No distress.   HENT:   Head: Normocephalic and atraumatic.   Right Ear: No drainage, swelling or tenderness. No foreign bodies. No mastoid tenderness. Tympanic membrane is not injected,  not perforated, not erythematous and not bulging. A middle ear effusion is present. No hemotympanum.   Left Ear: No drainage, swelling or tenderness. No foreign bodies. No mastoid tenderness. Tympanic membrane is not injected, not perforated, not erythematous and not bulging. A middle ear effusion is present. No hemotympanum.   Nose: Nose normal. No mucosal edema or rhinorrhea.   Mouth/Throat: Uvula is midline and oropharynx is clear and moist. No trismus in the jaw. No uvula swelling. No oropharyngeal exudate, posterior oropharyngeal edema, posterior oropharyngeal erythema or tonsillar abscesses.   Eyes: Conjunctivae and EOM are normal. Pupils are equal, round, and reactive to light. Right eye exhibits no discharge. Left eye exhibits no discharge. Right conjunctiva is not injected. Left conjunctiva is not injected.   Neck: Neck supple.   Normal range of motion.  Cardiovascular:  Normal rate, regular rhythm, normal heart sounds and intact distal pulses.     Exam reveals no gallop and no friction rub.       No murmur heard.  Pulmonary/Chest: Breath sounds normal. No respiratory distress. He has no wheezes. He has no rhonchi. He has no rales.   Abdominal: Abdomen is soft. Bowel sounds are normal. He exhibits no distension. There is no abdominal tenderness. There is no rebound and no guarding.   Musculoskeletal:         General: Normal range of motion.      Cervical back: Normal range of motion and neck supple.     Neurological: He is alert and oriented to person, place, and time. No cranial nerve deficit.   Skin: Skin is warm and dry. Capillary refill takes less than 2 seconds. No rash noted. There is erythema (mild erythema to skin to scalp posterior to ears b/l). No pallor.   Psychiatric: He has a normal mood and affect.       ED Course   Procedures  Labs Reviewed - No data to display       Imaging Results    None          Medications   diphenhydrAMINE capsule 25 mg (25 mg Oral Given 4/27/23 0118)     Medical  Decision Making:   Physical exam reveals mild erythema at site of pruritus, but patient reports he is frequently itching in this region. No swelling, mastoid tenderness or crepitus appreciated concerning for mastoiditis. No skin induration, fluctuance or drainage concerning for infection. B/l middle ear effusion appreciated, but otherwise unremarkable. Patient is non-toxic appearing w/ stable vitals, ok for discharge. Will discharge w/ topical steroid & antihistamine for symptomatic relief. Will also discharge w/ clinda to cover middle ear infection & skin infection. Referred to IM clinic for follow-up. ED precautions given for new or worsening symptoms.                        Clinical Impression:   Final diagnoses:  [L29.9] Itching of ear (Primary)  [H65.93] Fluid level behind tympanic membrane of both ears        ED Disposition Condition    Discharge Good          ED Prescriptions       Medication Sig Dispense Start Date End Date Auth. Provider    cetirizine (ZYRTEC) 10 MG tablet Take 1 tablet (10 mg total) by mouth once daily. 30 tablet 4/27/2023 4/26/2024 CHANDU Ferrer    clindamycin (CLEOCIN) 300 MG capsule Take 1 capsule (300 mg total) by mouth every 8 (eight) hours. for 7 days 21 capsule 4/27/2023 5/4/2023 CHANDU Ferrer    hydrocortisone 2.5 % cream Apply topically 2 (two) times daily. 28 g 4/27/2023 -- CHANDU Ferrer          Follow-up Information       Follow up With Specialties Details Why Contact Info Additional Information    [facility] Internal Medicine In 2 weeks  [address] [place] [address] [phone] Internal Medicine Clinic Entrance #1    [facility] Emergency [facility] Emergency Medicine  As needed, If symptoms worsen [address] [facility] [place] [address] [phone]              CHANDU Ferrer  04/27/23 7577

## 2023-04-27 NOTE — Clinical Note
"Chun Patel" Amado was seen and treated in our emergency department on 4/27/2023.  He may return to work on 04/28/2023.       If you have any questions or concerns, please don't hesitate to call.      RAJI WISE    "

## 2023-04-27 NOTE — DISCHARGE INSTRUCTIONS
Report to Emergency Department if symptoms return or worsen; [facility] Medicine Clinic Within 1 to 2 days, It is important that you follow up with your primary care provider or specialist if indicated for further evaluation, workup, and treatment as necessary. The exam and treatment you received in Emergency Department was for an urgent problem and NOT INTENDED AS COMPLETE CARE. It is important that you FOLLOW UP with a doctor for ongoing care. If your symptoms become WORSE or you DO NOT IMPROVE and you are unable to reach your health care provider, you should RETURN to the Emergency Department. The Emergency Department provider has provided a PRELIMINARY INTERPRETATION of all your studies. A final interpretation may be done after you are discharged. If a change in your diagnosis or treatment is needed WE WILL CONTACT YOU. It is critical that we have a CURRENT PHONE NUMBER FOR YOU.

## 2023-05-12 ENCOUNTER — HOSPITAL ENCOUNTER (EMERGENCY)
Facility: HOSPITAL | Age: 33
Discharge: HOME | End: 2023-05-12
Attending: STUDENT IN AN ORGANIZED HEALTH CARE EDUCATION/TRAINING PROGRAM
Payer: COMMERCIAL

## 2023-05-12 VITALS
BODY MASS INDEX: 26.64 KG/M2 | OXYGEN SATURATION: 99 % | TEMPERATURE: 98 F | HEART RATE: 77 BPM | RESPIRATION RATE: 16 BRPM | WEIGHT: 179.88 LBS | DIASTOLIC BLOOD PRESSURE: 80 MMHG | SYSTOLIC BLOOD PRESSURE: 123 MMHG | HEIGHT: 69 IN

## 2023-05-12 DIAGNOSIS — R06.02 SHORTNESS OF BREATH: ICD-10-CM

## 2023-05-12 DIAGNOSIS — R10.9 ABDOMINAL PAIN: ICD-10-CM

## 2023-05-12 DIAGNOSIS — K52.9 GASTROENTERITIS: ICD-10-CM

## 2023-05-12 DIAGNOSIS — R10.9 ABDOMINAL CRAMPING: Primary | ICD-10-CM

## 2023-05-12 LAB
ALBUMIN SERPL-MCNC: 4.2 G/DL (ref 3.5–5)
ALBUMIN/GLOB SERPL: 1.4 RATIO (ref 1.1–2)
ALP SERPL-CCNC: 71 UNIT/L (ref 40–150)
ALT SERPL-CCNC: 19 UNIT/L (ref 0–55)
APPEARANCE UR: CLEAR
AST SERPL-CCNC: 22 UNIT/L (ref 5–34)
BACTERIA #/AREA URNS AUTO: ABNORMAL /HPF
BASOPHILS # BLD AUTO: 0.01 X10(3)/MCL
BASOPHILS NFR BLD AUTO: 0.2 %
BILIRUB UR QL STRIP.AUTO: NEGATIVE MG/DL
BILIRUBIN DIRECT+TOT PNL SERPL-MCNC: 0.7 MG/DL
BUN SERPL-MCNC: 9.1 MG/DL (ref 8.9–20.6)
CALCIUM SERPL-MCNC: 9.4 MG/DL (ref 8.4–10.2)
CHLORIDE SERPL-SCNC: 107 MMOL/L (ref 98–107)
CO2 SERPL-SCNC: 26 MMOL/L (ref 22–29)
COLOR UR: YELLOW
CREAT SERPL-MCNC: 0.83 MG/DL (ref 0.73–1.18)
EOSINOPHIL # BLD AUTO: 0.05 X10(3)/MCL (ref 0–0.9)
EOSINOPHIL NFR BLD AUTO: 1 %
ERYTHROCYTE [DISTWIDTH] IN BLOOD BY AUTOMATED COUNT: 11.9 % (ref 11.5–17)
FLUAV AG UPPER RESP QL IA.RAPID: NOT DETECTED
FLUBV AG UPPER RESP QL IA.RAPID: NOT DETECTED
GFR SERPLBLD CREATININE-BSD FMLA CKD-EPI: >60 MLS/MIN/1.73/M2
GLOBULIN SER-MCNC: 2.9 GM/DL (ref 2.4–3.5)
GLUCOSE SERPL-MCNC: 83 MG/DL (ref 74–100)
GLUCOSE UR QL STRIP.AUTO: NORMAL MG/DL
HCT VFR BLD AUTO: 41.6 % (ref 42–52)
HGB BLD-MCNC: 14.2 G/DL (ref 14–18)
HYALINE CASTS #/AREA URNS LPF: ABNORMAL /LPF
IMM GRANULOCYTES # BLD AUTO: 0 X10(3)/MCL (ref 0–0.04)
IMM GRANULOCYTES NFR BLD AUTO: 0 %
KETONES UR QL STRIP.AUTO: NEGATIVE MG/DL
LACTATE SERPL-SCNC: 0.8 MMOL/L (ref 0.5–2.2)
LEUKOCYTE ESTERASE UR QL STRIP.AUTO: NEGATIVE UNIT/L
LIPASE SERPL-CCNC: 6 U/L
LYMPHOCYTES # BLD AUTO: 1.97 X10(3)/MCL (ref 0.6–4.6)
LYMPHOCYTES NFR BLD AUTO: 40.3 %
MCH RBC QN AUTO: 30.7 PG (ref 27–31)
MCHC RBC AUTO-ENTMCNC: 34.1 G/DL (ref 33–36)
MCV RBC AUTO: 89.8 FL (ref 80–94)
MONOCYTES # BLD AUTO: 0.33 X10(3)/MCL (ref 0.1–1.3)
MONOCYTES NFR BLD AUTO: 6.7 %
NEUTROPHILS # BLD AUTO: 2.53 X10(3)/MCL (ref 2.1–9.2)
NEUTROPHILS NFR BLD AUTO: 51.8 %
NITRITE UR QL STRIP.AUTO: NEGATIVE
NRBC BLD AUTO-RTO: 0 %
PH UR STRIP.AUTO: 7 [PH]
PLATELET # BLD AUTO: 198 X10(3)/MCL (ref 130–400)
PMV BLD AUTO: 11.8 FL (ref 7.4–10.4)
POTASSIUM SERPL-SCNC: 4.1 MMOL/L (ref 3.5–5.1)
PROT SERPL-MCNC: 7.1 GM/DL (ref 6.4–8.3)
PROT UR QL STRIP.AUTO: NEGATIVE MG/DL
RBC # BLD AUTO: 4.63 X10(6)/MCL (ref 4.7–6.1)
RBC #/AREA URNS AUTO: ABNORMAL /HPF
RBC UR QL AUTO: NEGATIVE UNIT/L
SARS-COV-2 RNA RESP QL NAA+PROBE: NOT DETECTED
SODIUM SERPL-SCNC: 140 MMOL/L (ref 136–145)
SP GR UR STRIP.AUTO: 1.02
SQUAMOUS #/AREA URNS LPF: ABNORMAL /HPF
UROBILINOGEN UR STRIP-ACNC: ABNORMAL MG/DL
WBC # SPEC AUTO: 4.89 X10(3)/MCL (ref 4.5–11.5)
WBC #/AREA URNS AUTO: ABNORMAL /HPF

## 2023-05-12 PROCEDURE — 99285 EMERGENCY DEPT VISIT HI MDM: CPT | Mod: 25

## 2023-05-12 PROCEDURE — 81001 URINALYSIS AUTO W/SCOPE: CPT | Performed by: STUDENT IN AN ORGANIZED HEALTH CARE EDUCATION/TRAINING PROGRAM

## 2023-05-12 PROCEDURE — 83605 ASSAY OF LACTIC ACID: CPT | Performed by: STUDENT IN AN ORGANIZED HEALTH CARE EDUCATION/TRAINING PROGRAM

## 2023-05-12 PROCEDURE — 0240U COVID/FLU A&B PCR: CPT | Performed by: STUDENT IN AN ORGANIZED HEALTH CARE EDUCATION/TRAINING PROGRAM

## 2023-05-12 PROCEDURE — 25000003 PHARM REV CODE 250: Performed by: STUDENT IN AN ORGANIZED HEALTH CARE EDUCATION/TRAINING PROGRAM

## 2023-05-12 PROCEDURE — 93005 ELECTROCARDIOGRAM TRACING: CPT

## 2023-05-12 PROCEDURE — 83690 ASSAY OF LIPASE: CPT | Performed by: STUDENT IN AN ORGANIZED HEALTH CARE EDUCATION/TRAINING PROGRAM

## 2023-05-12 PROCEDURE — 85025 COMPLETE CBC W/AUTO DIFF WBC: CPT | Performed by: STUDENT IN AN ORGANIZED HEALTH CARE EDUCATION/TRAINING PROGRAM

## 2023-05-12 PROCEDURE — 80053 COMPREHEN METABOLIC PANEL: CPT | Performed by: STUDENT IN AN ORGANIZED HEALTH CARE EDUCATION/TRAINING PROGRAM

## 2023-05-12 RX ORDER — DICYCLOMINE HYDROCHLORIDE 10 MG/1
10 CAPSULE ORAL
Status: COMPLETED | OUTPATIENT
Start: 2023-05-12 | End: 2023-05-12

## 2023-05-12 RX ORDER — DICYCLOMINE HYDROCHLORIDE 20 MG/1
20 TABLET ORAL 2 TIMES DAILY PRN
Qty: 20 TABLET | Refills: 0 | Status: SHIPPED | OUTPATIENT
Start: 2023-05-12 | End: 2024-03-11

## 2023-05-12 RX ADMIN — DICYCLOMINE HYDROCHLORIDE 10 MG: 10 CAPSULE ORAL at 10:05

## 2023-05-12 NOTE — Clinical Note
"Chun Patel" Amado was seen and treated in our emergency department on 5/12/2023.  He may return to work on 05/13/2023.       If you have any questions or concerns, please don't hesitate to call.      Hubert Keyes MD"

## 2023-05-12 NOTE — ED PROVIDER NOTES
Encounter Date: 5/12/2023       History     Chief Complaint   Patient presents with    Abdominal Pain     Bilateral lower abdominal pain started this morning; denies nausea/vomiting/diarrhea     Chun Fischer is a 33 y.o. male who presents with complaint of lower abdominal pain since this morning. Patient also reports fatigue for several days and SOB. Patient states he had soda and sausage for breakfast and subsequently developed lower abdominal pain. Denies nausea, vomiting, diarrhea, or constipation. Patient is particularly concerned about the associated fatigue. Denies sick contacts. Reports prior abdominal surgical history as a child but does not recall why. Has healed right lower quadrant incision.    The history is provided by the patient.   Review of patient's allergies indicates:  No Known Allergies  Past Medical History:   Diagnosis Date    Deaf     Hypertension     Migraines     Seizures      Past Surgical History:   Procedure Laterality Date    ABDOMINAL SURGERY      IMPLANTATION OF COCHLEAR PROSTHESIS      INNER EAR SURGERY Right      Family History   Problem Relation Age of Onset    Hypertension Mother      Social History     Tobacco Use    Smoking status: Heavy Smoker     Types: Cigars    Smokeless tobacco: Never   Substance Use Topics    Alcohol use: Not Currently    Drug use: Never     Review of Systems   Constitutional:  Positive for fatigue. Negative for chills and fever.   HENT: Negative.  Negative for congestion, rhinorrhea and sore throat.    Eyes:  Negative for pain, discharge and itching.   Respiratory:  Positive for shortness of breath. Negative for cough and chest tightness.    Cardiovascular:  Negative for chest pain and palpitations.   Gastrointestinal:  Positive for abdominal pain. Negative for abdominal distention, blood in stool, constipation, diarrhea, nausea, rectal pain and vomiting.   Genitourinary: Negative.  Negative for dysuria and hematuria.   Musculoskeletal:  Negative.  Negative for myalgias and neck pain.   Skin:  Negative for color change and rash.   Neurological: Negative.  Negative for dizziness, weakness and headaches.   Psychiatric/Behavioral:  Negative for confusion. The patient is not hyperactive.      Physical Exam     Initial Vitals [05/12/23 1016]   BP Pulse Resp Temp SpO2   123/80 77 16 98.2 °F (36.8 °C) 99 %      MAP       --         Physical Exam    Constitutional: He appears well-developed and well-nourished. He is not diaphoretic. No distress.   HENT:   Head: Normocephalic and atraumatic.       Eyes: Conjunctivae and EOM are normal. Pupils are equal, round, and reactive to light. Right eye exhibits no discharge. Left eye exhibits no discharge. No scleral icterus.   Neck: Neck supple. No tracheal deviation present.   Normal range of motion.  Cardiovascular:  Normal rate, regular rhythm and normal heart sounds.           Pulmonary/Chest: Breath sounds normal. No respiratory distress. He has no wheezes.   Abdominal: Abdomen is soft and flat. Bowel sounds are normal. A surgical scar is present. There is abdominal tenderness in the suprapubic area.   RLQ surgical scar There is no rebound, no guarding, no tenderness at McBurney's point and negative Rivera's sign. negative Rovsing's sign  Musculoskeletal:         General: No tenderness. Normal range of motion.      Cervical back: Normal range of motion and neck supple.     Neurological: He is alert and oriented to person, place, and time. He has normal strength. GCS score is 15. GCS eye subscore is 4. GCS verbal subscore is 5. GCS motor subscore is 6.   Skin: Skin is warm and dry. Capillary refill takes less than 2 seconds. No rash noted.   Psychiatric: He has a normal mood and affect. His behavior is normal. Judgment and thought content normal.       ED Course   Procedures  Labs Reviewed   URINALYSIS, REFLEX TO URINE CULTURE - Abnormal; Notable for the following components:       Result Value    Urobilinogen,  UA 1+ (*)     All other components within normal limits   CBC WITH DIFFERENTIAL - Abnormal; Notable for the following components:    RBC 4.63 (*)     Hct 41.6 (*)     MPV 11.8 (*)     All other components within normal limits   COVID/FLU A&B PCR - Normal    Narrative:     The Xpert Xpress SARS-CoV-2/FLU/RSV plus is a rapid, multiplexed real-time PCR test intended for the simultaneous qualitative detection and differentiation of SARS-CoV-2, Influenza A, Influenza B, and respiratory syncytial virus (RSV) viral RNA in either nasopharyngeal swab or nasal swab specimens.         LIPASE - Normal   LACTIC ACID, PLASMA - Normal   CBC W/ AUTO DIFFERENTIAL    Narrative:     The following orders were created for panel order CBC auto differential.  Procedure                               Abnormality         Status                     ---------                               -----------         ------                     CBC with Differential[[id number]]        Abnormal            Final result                 Please view results for these tests on the individual orders.   COMPREHENSIVE METABOLIC PANEL   EXTRA TUBES    Narrative:     The following orders were created for panel order EXTRA TUBES.  Procedure                               Abnormality         Status                     ---------                               -----------         ------                     Light Blue Top Hold[[id number]]                              In process                   Please view results for these tests on the individual orders.   LIGHT BLUE TOP HOLD     EKG Readings: (Independently Interpreted)   Initial Reading: No STEMI. Rhythm: Normal Sinus Rhythm. Axis: Normal. Clinical Impression: Normal Sinus Rhythm   ECG Results              EKG 12-lead (Final result)  Result time 05/12/23 14:55:44      Final result by Interface, Lab In [facility] (05/12/23 14:55:44)                   Narrative:    Test Reason : R10.9,    Vent. Rate : 069 BPM     Atrial  Rate : 069 BPM     P-R Int : 152 ms          QRS Dur : 084 ms      QT Int : 390 ms       P-R-T Axes : 064 035 010 degrees     QTc Int : 417 ms    Normal sinus rhythm  Normal ECG  Confirmed by Elvis Mireles MD ([id number]) on 5/12/2023 2:55:30 PM    Referred By: OWEN   SELF           Confirmed By:Elvis Mireles MD                                  Imaging Results              X-Ray Chest PA And Lateral (Final result)  Result time 05/12/23 11:58:12      Final result by Cosme Edge MD (05/12/23 11:58:12)                   Impression:      No acute cardiopulmonary abnormality.      Electronically signed by: Cosme Edge  Date:    05/12/2023  Time:    11:58               Narrative:    EXAMINATION:  XR CHEST PA AND LATERAL    CLINICAL HISTORY:  Shortness of breath    COMPARISON:  4 November 2022    FINDINGS:  PA and lateral views of the chest were obtained. Heart and mediastinum within normal limits. The lungs are clear. No pneumothorax or significant effusion.                                       Medications   dicyclomine capsule 10 mg (10 mg Oral Given 5/12/23 1057)     Medical Decision Making:   History:   Old Medical Records: I decided to obtain old medical records.  Initial Assessment:   Stable, non-toxic appearing.  Differential Diagnosis:   URI, gastroenteritis, pancreatitis  Clinical Tests:   Lab Tests: Ordered and Reviewed  Radiological Study: Ordered and Reviewed  Medical Tests: Ordered and Reviewed  ED Management:  Patient presents abdominal pain associated with fatigue, diaphoresis, and shortness of breath. Stable, non-toxic appearing. Oxygen saturation 99% on room air. Physical exam findings significant for mild lower abdominal tenderness without rebound or guarding. Workup grossly unremarkable. Treated with bentyl in the ED and reported complete resolution of symptoms.  Will prescribe additional p.r.n. medications for home use. Recommend follow up with PCP. Return precautions discussed.          Attending  Attestation:   Physician Attestation Statement for Resident:  As the supervising MD   Physician Attestation Statement: I have personally seen and examined this patient.   I agree with the above history.  -:   As the supervising MD I agree with the above PE.     As the supervising MD I agree with the above treatment, course, plan, and disposition.   -:     Agree with the resident's assessment/plan as listed above.  I independently saw and evaluated this patient.                              Clinical Impression:   Final diagnoses:  [R10.9] Abdominal cramping (Primary)  [K52.9] Gastroenteritis        ED Disposition Condition    Discharge Stable          ED Prescriptions       Medication Sig Dispense Start Date End Date Auth. Provider    dicyclomine (BENTYL) 20 mg tablet Take 1 tablet (20 mg total) by mouth 2 (two) times daily as needed (cramping). 20 tablet 5/12/2023 -- Hubert Keyes MD          Follow-up Information       Follow up With Specialties Details Why Contact Info    [facility] - Emergency Dept Emergency Medicine  As needed, If symptoms worsen [address] [place] [address] [phone]    Primary  Schedule an appointment as soon as possible for a visit in 1 week               Hubert Keyes MD  05/13/23 0012

## 2023-05-19 ENCOUNTER — HOSPITAL ENCOUNTER (EMERGENCY)
Facility: HOSPITAL | Age: 33
Discharge: HOME | End: 2023-05-19
Attending: FAMILY MEDICINE
Payer: COMMERCIAL

## 2023-05-19 VITALS
RESPIRATION RATE: 18 BRPM | SYSTOLIC BLOOD PRESSURE: 126 MMHG | HEART RATE: 91 BPM | BODY MASS INDEX: 27.11 KG/M2 | OXYGEN SATURATION: 99 % | HEIGHT: 69 IN | TEMPERATURE: 98 F | DIASTOLIC BLOOD PRESSURE: 84 MMHG | WEIGHT: 183 LBS

## 2023-05-19 DIAGNOSIS — L70.8 ACNE COSMETICA: Primary | ICD-10-CM

## 2023-05-19 PROCEDURE — 99283 EMERGENCY DEPT VISIT LOW MDM: CPT

## 2023-05-19 RX ORDER — CLINDAMYCIN PHOSPHATE 10 UG/ML
LOTION TOPICAL 2 TIMES DAILY
Qty: 60 ML | Refills: 0 | OUTPATIENT
Start: 2023-05-19 | End: 2024-02-07

## 2023-05-19 NOTE — ED PROVIDER NOTES
Encounter Date: 5/19/2023       History     Chief Complaint   Patient presents with    Facial Pain     Pt c/o facial pain and swelling x2 days     Patient is a 33-year-old gentleman presents emergency room complaints of facial pain and discomfort.  Patient reports having swelling to his face irritation for the last 2 days after using a certain type product to clean his face-possibly something containing alcohol.  Patient developed closed comedones following the application.  When symptoms not improve, came to emergency room for evaluation.    The history is provided by the patient.   Review of patient's allergies indicates:  No Known Allergies  Past Medical History:   Diagnosis Date    Deaf     Hypertension     Migraines     Seizures      Past Surgical History:   Procedure Laterality Date    ABDOMINAL SURGERY      IMPLANTATION OF COCHLEAR PROSTHESIS      INNER EAR SURGERY Right      Family History   Problem Relation Age of Onset    Hypertension Mother      Social History     Tobacco Use    Smoking status: Heavy Smoker     Types: Cigars    Smokeless tobacco: Never   Substance Use Topics    Alcohol use: Not Currently    Drug use: Never     Review of Systems   Constitutional:  Negative for chills, fatigue and fever.   HENT:  Negative for ear pain, rhinorrhea and sore throat.    Eyes:  Negative for photophobia and pain.   Respiratory:  Negative for cough, shortness of breath and wheezing.    Cardiovascular:  Negative for chest pain.   Gastrointestinal:  Negative for abdominal pain, diarrhea, nausea and vomiting.   Genitourinary:  Negative for dysuria.   Neurological:  Negative for dizziness, weakness and headaches.   All other systems reviewed and are negative.    Physical Exam     Initial Vitals [05/19/23 0554]   BP Pulse Resp Temp SpO2   126/84 91 18 97.5 °F (36.4 °C) 99 %      MAP       --         Physical Exam    Nursing note and vitals reviewed.  Constitutional: He appears well-developed and well-nourished.   HENT:    Head: Normocephalic and atraumatic.   No facial soft tissue swelling.  No acute dental caries   Eyes: EOM are normal. Pupils are equal, round, and reactive to light.   Neck: Neck supple.   Normal range of motion.  Cardiovascular:  Normal rate, regular rhythm and normal heart sounds.     Exam reveals no gallop and no friction rub.       No murmur heard.  Pulmonary/Chest: Breath sounds normal. No respiratory distress.   Abdominal: Abdomen is soft. Bowel sounds are normal. He exhibits no distension. There is no abdominal tenderness.   Musculoskeletal:         General: Normal range of motion.      Cervical back: Normal range of motion and neck supple.     Neurological: He is alert and oriented to person, place, and time. He has normal strength.   Skin: Skin is warm and dry. Capillary refill takes less than 2 seconds.   Multiple closed comedones on the face behind the ears around the neck line, forehead, cheeks, chin.   Psychiatric: He has a normal mood and affect. His behavior is normal. Judgment and thought content normal.       ED Course   Procedures  Labs Reviewed - No data to display       Imaging Results    None          Medications - No data to display  Medical Decision Making:   Initial Assessment:   Patient is a 33-year-old gentleman presents emergency room for evaluation with complaints essentially facial acne with multiple closed comedones.  Patient reports this occurred after applying a washed his face 2 days prior which may have contain alcohol products.  Patient informed to just use soap and water to cleanse his face, and will also place on a clindamycin topical ointment.  Stable for discharge to home.  ER precautions given for any acute worsening.                        Clinical Impression:   Final diagnoses:  [L70.8] Acne cosmetica (Primary)        ED Disposition Condition    Discharge Stable          ED Prescriptions       Medication Sig Dispense Start Date End Date Auth. Provider    clindamycin  (CLEOCIN T) 1 % lotion Apply topically 2 (two) times daily. for 10 days 60 mL 5/19/2023 5/29/2023 Vineet Hogan MD          Follow-up Information       Follow up With Specialties Details Why Contact Info    [facility] - Emergency Dept Emergency Medicine  As needed, If symptoms worsen [address] [facility] [id number] [phone]    Primary Care Physician  In 5 days               Vineet Hogan MD  05/19/23 0606

## 2023-05-22 ENCOUNTER — HOSPITAL ENCOUNTER (EMERGENCY)
Facility: HOSPITAL | Age: 33
Discharge: HOME | End: 2023-05-22
Attending: EMERGENCY MEDICINE
Payer: COMMERCIAL

## 2023-05-22 VITALS
WEIGHT: 181.44 LBS | OXYGEN SATURATION: 99 % | BODY MASS INDEX: 26.87 KG/M2 | TEMPERATURE: 99 F | HEIGHT: 69 IN | DIASTOLIC BLOOD PRESSURE: 96 MMHG | SYSTOLIC BLOOD PRESSURE: 125 MMHG | HEART RATE: 57 BPM | RESPIRATION RATE: 16 BRPM

## 2023-05-22 DIAGNOSIS — R51.9 NONINTRACTABLE HEADACHE, UNSPECIFIED CHRONICITY PATTERN, UNSPECIFIED HEADACHE TYPE: Primary | ICD-10-CM

## 2023-05-22 DIAGNOSIS — R19.7 NAUSEA VOMITING AND DIARRHEA: ICD-10-CM

## 2023-05-22 DIAGNOSIS — R11.2 NAUSEA VOMITING AND DIARRHEA: ICD-10-CM

## 2023-05-22 LAB
ANION GAP SERPL CALC-SCNC: 8 MEQ/L
BASOPHILS # BLD AUTO: 0.01 X10(3)/MCL
BASOPHILS NFR BLD AUTO: 0.2 %
BUN SERPL-MCNC: 9.3 MG/DL (ref 8.9–20.6)
CALCIUM SERPL-MCNC: 9.2 MG/DL (ref 8.4–10.2)
CHLORIDE SERPL-SCNC: 105 MMOL/L (ref 98–107)
CO2 SERPL-SCNC: 26 MMOL/L (ref 22–29)
CREAT SERPL-MCNC: 0.83 MG/DL (ref 0.73–1.18)
CREAT/UREA NIT SERPL: 11
CRP SERPL-MCNC: 8.5 MG/L
EOSINOPHIL # BLD AUTO: 0.12 X10(3)/MCL (ref 0–0.9)
EOSINOPHIL NFR BLD AUTO: 2.9 %
ERYTHROCYTE [DISTWIDTH] IN BLOOD BY AUTOMATED COUNT: 12.1 % (ref 11.5–17)
FLUAV AG UPPER RESP QL IA.RAPID: NOT DETECTED
FLUBV AG UPPER RESP QL IA.RAPID: NOT DETECTED
GFR SERPLBLD CREATININE-BSD FMLA CKD-EPI: >60 MLS/MIN/1.73/M2
GLUCOSE SERPL-MCNC: 78 MG/DL (ref 74–100)
HCT VFR BLD AUTO: 40.5 % (ref 42–52)
HGB BLD-MCNC: 13.8 G/DL (ref 14–18)
IMM GRANULOCYTES # BLD AUTO: 0 X10(3)/MCL (ref 0–0.04)
IMM GRANULOCYTES NFR BLD AUTO: 0 %
LYMPHOCYTES # BLD AUTO: 1.62 X10(3)/MCL (ref 0.6–4.6)
LYMPHOCYTES NFR BLD AUTO: 38.8 %
MAGNESIUM SERPL-MCNC: 1.9 MG/DL (ref 1.6–2.6)
MCH RBC QN AUTO: 30.7 PG (ref 27–31)
MCHC RBC AUTO-ENTMCNC: 34.1 G/DL (ref 33–36)
MCV RBC AUTO: 90 FL (ref 80–94)
MONOCYTES # BLD AUTO: 0.33 X10(3)/MCL (ref 0.1–1.3)
MONOCYTES NFR BLD AUTO: 7.9 %
NEUTROPHILS # BLD AUTO: 2.1 X10(3)/MCL (ref 2.1–9.2)
NEUTROPHILS NFR BLD AUTO: 50.2 %
NRBC BLD AUTO-RTO: 0 %
PLATELET # BLD AUTO: 163 X10(3)/MCL (ref 130–400)
PMV BLD AUTO: 11.2 FL (ref 7.4–10.4)
POTASSIUM SERPL-SCNC: 3.8 MMOL/L (ref 3.5–5.1)
RBC # BLD AUTO: 4.5 X10(6)/MCL (ref 4.7–6.1)
SARS-COV-2 RNA RESP QL NAA+PROBE: NOT DETECTED
SODIUM SERPL-SCNC: 139 MMOL/L (ref 136–145)
WBC # SPEC AUTO: 4.18 X10(3)/MCL (ref 4.5–11.5)

## 2023-05-22 PROCEDURE — 83735 ASSAY OF MAGNESIUM: CPT | Performed by: EMERGENCY MEDICINE

## 2023-05-22 PROCEDURE — 96374 THER/PROPH/DIAG INJ IV PUSH: CPT

## 2023-05-22 PROCEDURE — 86140 C-REACTIVE PROTEIN: CPT | Performed by: EMERGENCY MEDICINE

## 2023-05-22 PROCEDURE — 80048 BASIC METABOLIC PNL TOTAL CA: CPT | Performed by: EMERGENCY MEDICINE

## 2023-05-22 PROCEDURE — 85025 COMPLETE CBC W/AUTO DIFF WBC: CPT | Performed by: EMERGENCY MEDICINE

## 2023-05-22 PROCEDURE — 96375 TX/PRO/DX INJ NEW DRUG ADDON: CPT

## 2023-05-22 PROCEDURE — 0240U COVID/FLU A&B PCR: CPT | Performed by: EMERGENCY MEDICINE

## 2023-05-22 PROCEDURE — 63600175 PHARM REV CODE 636 W HCPCS: Performed by: EMERGENCY MEDICINE

## 2023-05-22 PROCEDURE — 99284 EMERGENCY DEPT VISIT MOD MDM: CPT | Mod: 25

## 2023-05-22 RX ORDER — IBUPROFEN 800 MG/1
800 TABLET ORAL EVERY 8 HOURS PRN
Qty: 20 TABLET | Refills: 0 | Status: SHIPPED | OUTPATIENT
Start: 2023-05-22 | End: 2023-05-22 | Stop reason: SDUPTHER

## 2023-05-22 RX ORDER — IBUPROFEN 800 MG/1
800 TABLET ORAL EVERY 8 HOURS PRN
Qty: 20 TABLET | Refills: 0 | Status: SHIPPED | OUTPATIENT
Start: 2023-05-22 | End: 2023-11-12 | Stop reason: SDUPTHER

## 2023-05-22 RX ORDER — KETOROLAC TROMETHAMINE 30 MG/ML
15 INJECTION, SOLUTION INTRAMUSCULAR; INTRAVENOUS
Status: COMPLETED | OUTPATIENT
Start: 2023-05-22 | End: 2023-05-22

## 2023-05-22 RX ORDER — ONDANSETRON 2 MG/ML
4 INJECTION INTRAMUSCULAR; INTRAVENOUS
Status: COMPLETED | OUTPATIENT
Start: 2023-05-22 | End: 2023-05-22

## 2023-05-22 RX ORDER — ONDANSETRON 4 MG/1
4 TABLET, ORALLY DISINTEGRATING ORAL EVERY 8 HOURS PRN
Qty: 14 TABLET | Refills: 0 | OUTPATIENT
Start: 2023-05-22 | End: 2024-02-07

## 2023-05-22 RX ADMIN — ONDANSETRON 4 MG: 2 INJECTION INTRAMUSCULAR; INTRAVENOUS at 05:05

## 2023-05-22 RX ADMIN — KETOROLAC TROMETHAMINE 15 MG: 30 INJECTION, SOLUTION INTRAMUSCULAR; INTRAVENOUS at 05:05

## 2023-05-22 NOTE — ED PROVIDER NOTES
ED PROVIDER NOTE  5/22/2023    CHIEF COMPLAINT:   Chief Complaint   Patient presents with    Headache     Pt c/o headache 10/10, feeling tired and rash to face.        HISTORY OF PRESENT ILLNESS:   Chun Fischer is a 33 y.o. male who presents with chief complaint Headache.  Onset was yesterday whenever he began having headache that he describes as diffuse throbbing that has been constant, no specific aggravating or alleviating factors noted.  Currently 8/10 in severity.  States feels similar to migraines he is had in the past.  Associated symptoms of sinus congestion, dizziness, generalized weakness, nausea, vomiting, and diarrhea.  Denies earache, neck pain or stiffness, fever, abdominal pain, chest pain, shortness of breath, numbness or weakness down 1 side of his body, changes in vision.    The history is provided by the patient. The history is limited by a language barrier. A  was used.       REVIEW OF SYSTEMS: as noted in the HPI.  NURSING NOTES REVIEWED      PAST MEDICAL/SURGICAL HISTORY:   Past Medical History:   Diagnosis Date    Deaf     Hypertension     Migraines     Seizures       Past Surgical History:   Procedure Laterality Date    ABDOMINAL SURGERY      IMPLANTATION OF COCHLEAR PROSTHESIS      INNER EAR SURGERY Right        FAMILY HISTORY:   Family History   Problem Relation Age of Onset    Hypertension Mother        SOCIAL HISTORY:   Social History     Tobacco Use    Smoking status: Heavy Smoker     Types: Cigars    Smokeless tobacco: Never   Substance Use Topics    Alcohol use: Not Currently    Drug use: Never       ALLERGIES: Review of patient's allergies indicates:  No Known Allergies    PHYSICAL EXAM:  Initial Vitals [05/22/23 0136]   BP Pulse Resp Temp SpO2   131/87 68 16 98.6 °F (37 °C) 100 %      MAP       --         Physical Exam    Nursing note and vitals reviewed.  Constitutional: He appears well-developed and well-nourished. No distress.   HENT:   Head:  Normocephalic and atraumatic.   Nose: Nose normal.   Mouth/Throat: Oropharynx is clear and moist and mucous membranes are normal.   Eyes: Conjunctivae and EOM are normal. Pupils are equal, round, and reactive to light.   Neck: Neck supple. No tracheal deviation present. No Brudzinski's sign and no Kernig's sign noted.   Normal range of motion.   Full passive range of motion without pain.     Cardiovascular:  Normal rate, regular rhythm, normal heart sounds, intact distal pulses and normal pulses.           Pulmonary/Chest: Effort normal and breath sounds normal. No respiratory distress.   Abdominal: Abdomen is soft. There is no abdominal tenderness. There is no rebound and no guarding.   Musculoskeletal:         General: Normal range of motion.      Cervical back: Full passive range of motion without pain, normal range of motion and neck supple. No rigidity. Normal range of motion.     Neurological: He is alert. GCS score is 15.   No focal neurologic deficits.   Skin: Skin is warm, dry and intact.   Psychiatric: He has a normal mood and affect. His behavior is normal. Judgment and thought content normal. Cognition and memory are normal.       RESULTS:  Labs Reviewed   C-REACTIVE PROTEIN - Abnormal; Notable for the following components:       Result Value    C-Reactive Protein 8.50 (*)     All other components within normal limits   CBC WITH DIFFERENTIAL - Abnormal; Notable for the following components:    WBC 4.18 (*)     RBC 4.50 (*)     Hgb 13.8 (*)     Hct 40.5 (*)     MPV 11.2 (*)     All other components within normal limits   MAGNESIUM - Normal   COVID/FLU A&B PCR - Normal    Narrative:     The Xpert Xpress SARS-CoV-2/FLU/RSV plus is a rapid, multiplexed real-time PCR test intended for the simultaneous qualitative detection and differentiation of SARS-CoV-2, Influenza A, Influenza B, and respiratory syncytial virus (RSV) viral RNA in either nasopharyngeal swab or nasal swab specimens.         BASIC METABOLIC  PANEL   CBC W/ AUTO DIFFERENTIAL    Narrative:     The following orders were created for panel order CBC auto differential.  Procedure                               Abnormality         Status                     ---------                               -----------         ------                     CBC with Differential[[id number]]        Abnormal            Final result                 Please view results for these tests on the individual orders.   EXTRA TUBES    Narrative:     The following orders were created for panel order EXTRA TUBES.  Procedure                               Abnormality         Status                     ---------                               -----------         ------                     Light Blue Top Hold[[id number]]                              In process                 Light Green Top Hold[[id number]]                             In process                 Lavender Top Hold[[id number]]                                In process                 Gold Top Hold[[id number]]                                    In process                   Please view results for these tests on the individual orders.   LIGHT BLUE TOP HOLD   LIGHT GREEN TOP HOLD   LAVENDER TOP HOLD   GOLD TOP HOLD     Imaging Results    None         PROCEDURES:  Procedures    ECG:       ED COURSE AND MEDICAL DECISION MAKING:  Medications   ketorolac injection 15 mg (15 mg Intravenous Given 5/22/23 0556)   ondansetron injection 4 mg (4 mg Intravenous Given 5/22/23 0556)     ED Course as of 05/22/23 0724   Mon May 22, 2023   0705 Headache has improved. Nausea resolved. Feeling much better. [IB]      ED Course User Index  [IB] Landon Glaser, DO        Medical Decision Making  Well-appearing 33-year-old male presents with headache associated with sinus congestion, dizziness, generalized weakness, nausea, vomiting come and diarrhea over the past day.  States headache feels similar to migraines he is had in the past.  Low suspicion for  meningitis.  CBC shows mild leukopenia of 4.18.  CRP 8.5 which is improved from 27.6 a couple of months ago.  BMP unremarkable.  COVID and flu negative.  Patient reports feeling much better after Toradol and Zofran.  We will discharge with Zofran and ibuprofen p.r.n. with recommendation to take Pepto-Bismol and or Imodium for diarrhea.  Given strict ED return precautions. I have spoken with the patient and/or caregivers. I have explained the patient's condition, diagnoses and treatment plan based on the information available to me at this time. I have answered the patient's and/or caregiver's questions and addressed any concerns. The patient and/or caregivers have as good an understanding of the patient's diagnosis, condition and treatment plan as can be expected at this point. The vital signs have been stable. The patient's condition is stable and appropriate for discharge from the emergency department.     The patient will pursue further outpatient evaluation with the primary care physician or other designated or consulting physician as outlined in the discharge instructions. The patient and/or caregivers are agreeable to this plan of care and follow-up instructions have been explained in detail. The patient and/or caregivers have received these instructions in written format and have expressed an understanding of the discharge instructions. The patient and/or caregivers are aware that any significant change in condition or worsening of symptoms should prompt an immediate return to this or the closest emergency department or a call to 911.    Problems Addressed:  Nausea vomiting and diarrhea: complicated acute illness or injury     Details: Differential diagnosis includes gastroenteritis, colitis, food poisoning.  Nonintractable headache, unspecified chronicity pattern, unspecified headache type: chronic illness or injury with exacerbation, progression, or side effects of treatment     Details: Differential  Diagnoses: Migraine, Sinusitis, Cluster Headache, Tension Headache, Cervicogenic Headache, Meningitis, Brain Tumor, Subarachnoid Hemorrhage.    Amount and/or Complexity of Data Reviewed  External Data Reviewed: labs and notes.  Labs: ordered. Decision-making details documented in ED Course.    Risk  Prescription drug management.        CLINICAL IMPRESSION:  1. Nonintractable headache, unspecified chronicity pattern, unspecified headache type    2. Nausea vomiting and diarrhea        DISPOSITION:   ED Disposition Condition    Discharge Stable            ED Prescriptions       Medication Sig Dispense Start Date End Date Auth. Provider    ondansetron (ZOFRAN-ODT) 4 MG TbDL Take 1 tablet (4 mg total) by mouth every 8 (eight) hours as needed (nausea and vomiting). 14 tablet 5/22/2023 -- Landon Glaser DO    ibuprofen (ADVIL,MOTRIN) 800 MG tablet  (Status: Discontinued) Take 1 tablet (800 mg total) by mouth every 8 (eight) hours as needed for Pain (take with food for pain). 20 tablet 5/22/2023 5/22/2023 Landon Glaser DO    ibuprofen (ADVIL,MOTRIN) 800 MG tablet Take 1 tablet (800 mg total) by mouth every 8 (eight) hours as needed for Pain (take with food for pain). 20 tablet 5/22/2023 -- Landon Glaser DO          Follow-up Information       Follow up With Specialties Details Why Contact Info    [facility] - Emergency Dept Emergency Medicine  If symptoms worsen [address] [facility] [address] [phone]               Landon Glaser DO  05/22/23 2781

## 2023-05-23 ENCOUNTER — HOSPITAL ENCOUNTER (EMERGENCY)
Facility: HOSPITAL | Age: 33
Discharge: HOME | End: 2023-05-24
Attending: STUDENT IN AN ORGANIZED HEALTH CARE EDUCATION/TRAINING PROGRAM
Payer: COMMERCIAL

## 2023-05-23 DIAGNOSIS — R21 RASH AND NONSPECIFIC SKIN ERUPTION: Primary | ICD-10-CM

## 2023-05-23 DIAGNOSIS — L08.9 PUSTULE: ICD-10-CM

## 2023-05-23 PROCEDURE — 99283 EMERGENCY DEPT VISIT LOW MDM: CPT

## 2023-05-23 NOTE — Clinical Note
"Chun Patel" Jessicadavidsourav was seen and treated in our emergency department on 5/23/2023.  He may return to work on 05/24/2023.       If you have any questions or concerns, please don't hesitate to call.       RN    "

## 2023-05-24 VITALS
OXYGEN SATURATION: 99 % | BODY MASS INDEX: 32.58 KG/M2 | RESPIRATION RATE: 18 BRPM | TEMPERATURE: 99 F | HEIGHT: 69 IN | WEIGHT: 220 LBS | HEART RATE: 70 BPM | SYSTOLIC BLOOD PRESSURE: 140 MMHG | DIASTOLIC BLOOD PRESSURE: 75 MMHG

## 2023-05-24 LAB
ALBUMIN SERPL-MCNC: 3.8 G/DL (ref 3.5–5)
ALBUMIN/GLOB SERPL: 1.3 RATIO (ref 1.1–2)
ALP SERPL-CCNC: 71 UNIT/L (ref 40–150)
ALT SERPL-CCNC: 14 UNIT/L (ref 0–55)
AST SERPL-CCNC: 20 UNIT/L (ref 5–34)
BASOPHILS # BLD AUTO: 0.01 X10(3)/MCL
BASOPHILS NFR BLD AUTO: 0.2 %
BILIRUBIN DIRECT+TOT PNL SERPL-MCNC: 0.5 MG/DL
BUN SERPL-MCNC: 8 MG/DL (ref 8.9–20.6)
CALCIUM SERPL-MCNC: 8.9 MG/DL (ref 8.4–10.2)
CHLORIDE SERPL-SCNC: 107 MMOL/L (ref 98–107)
CO2 SERPL-SCNC: 26 MMOL/L (ref 22–29)
CREAT SERPL-MCNC: 0.96 MG/DL (ref 0.73–1.18)
EOSINOPHIL # BLD AUTO: 0.08 X10(3)/MCL (ref 0–0.9)
EOSINOPHIL NFR BLD AUTO: 1.3 %
ERYTHROCYTE [DISTWIDTH] IN BLOOD BY AUTOMATED COUNT: 12.2 % (ref 11.5–17)
GFR SERPLBLD CREATININE-BSD FMLA CKD-EPI: >60 MLS/MIN/1.73/M2
GLOBULIN SER-MCNC: 2.9 GM/DL (ref 2.4–3.5)
GLUCOSE SERPL-MCNC: 80 MG/DL (ref 74–100)
HCT VFR BLD AUTO: 41 % (ref 42–52)
HGB BLD-MCNC: 13.9 G/DL (ref 14–18)
IMM GRANULOCYTES # BLD AUTO: 0.02 X10(3)/MCL (ref 0–0.04)
IMM GRANULOCYTES NFR BLD AUTO: 0.3 %
LYMPHOCYTES # BLD AUTO: 2.17 X10(3)/MCL (ref 0.6–4.6)
LYMPHOCYTES NFR BLD AUTO: 34 %
MCH RBC QN AUTO: 30.7 PG (ref 27–31)
MCHC RBC AUTO-ENTMCNC: 33.9 G/DL (ref 33–36)
MCV RBC AUTO: 90.5 FL (ref 80–94)
MONOCYTES # BLD AUTO: 0.46 X10(3)/MCL (ref 0.1–1.3)
MONOCYTES NFR BLD AUTO: 7.2 %
NEUTROPHILS # BLD AUTO: 3.65 X10(3)/MCL (ref 2.1–9.2)
NEUTROPHILS NFR BLD AUTO: 57 %
NRBC BLD AUTO-RTO: 0 %
PLATELET # BLD AUTO: 172 X10(3)/MCL (ref 130–400)
PMV BLD AUTO: 11.3 FL (ref 7.4–10.4)
POTASSIUM SERPL-SCNC: 3.7 MMOL/L (ref 3.5–5.1)
PROT SERPL-MCNC: 6.7 GM/DL (ref 6.4–8.3)
RBC # BLD AUTO: 4.53 X10(6)/MCL (ref 4.7–6.1)
SODIUM SERPL-SCNC: 139 MMOL/L (ref 136–145)
WBC # SPEC AUTO: 6.39 X10(3)/MCL (ref 4.5–11.5)

## 2023-05-24 PROCEDURE — 80053 COMPREHEN METABOLIC PANEL: CPT | Performed by: STUDENT IN AN ORGANIZED HEALTH CARE EDUCATION/TRAINING PROGRAM

## 2023-05-24 PROCEDURE — 85025 COMPLETE CBC W/AUTO DIFF WBC: CPT | Performed by: STUDENT IN AN ORGANIZED HEALTH CARE EDUCATION/TRAINING PROGRAM

## 2023-05-24 PROCEDURE — 25000003 PHARM REV CODE 250: Performed by: STUDENT IN AN ORGANIZED HEALTH CARE EDUCATION/TRAINING PROGRAM

## 2023-05-24 RX ORDER — SULFAMETHOXAZOLE AND TRIMETHOPRIM 800; 160 MG/1; MG/1
1 TABLET ORAL 2 TIMES DAILY
Qty: 14 TABLET | Refills: 0 | Status: SHIPPED | OUTPATIENT
Start: 2023-05-24 | End: 2023-05-31

## 2023-05-24 RX ADMIN — Medication: at 01:05

## 2023-05-24 NOTE — ED PROVIDER NOTES
Encounter Date: 5/23/2023       History     Chief Complaint   Patient presents with    Rash    Abscess     Myalgia, rash, and R. Glute pain. Does not know when the symptoms started, denies fever, abd pain, n/v, or drainage from site. Seen yesterday for similar complaint-- Pt is deaf, ASL, able to read lips.      33-year-old male medical history significant for deficits, hypertension, migraines presents emergency department chief complaint of rash to face and pain to right buttock.  He states symptoms began he believes proximally 2 days ago.  Not associated with any fever or chills.  Denies any shortness of breath chest pain difficulty swallowing.  Denies any new soaps or detergents.  Has come in the emergency department further evaluation.    The history is provided by the patient.   Review of patient's allergies indicates:  No Known Allergies  Past Medical History:   Diagnosis Date    Deaf     Hypertension     Migraines     Seizures      Past Surgical History:   Procedure Laterality Date    ABDOMINAL SURGERY      IMPLANTATION OF COCHLEAR PROSTHESIS      INNER EAR SURGERY Right      Family History   Problem Relation Age of Onset    Hypertension Mother      Social History     Tobacco Use    Smoking status: Heavy Smoker     Types: Cigars    Smokeless tobacco: Never   Substance Use Topics    Alcohol use: Not Currently    Drug use: Never     Review of Systems   Constitutional:  Negative for chills, fatigue and fever.   Respiratory:  Negative for chest tightness and shortness of breath.    Cardiovascular:  Negative for chest pain.   Gastrointestinal:  Negative for abdominal pain.   Skin:  Positive for rash.     Physical Exam     Initial Vitals [05/23/23 1958]   BP Pulse Resp Temp SpO2   (!) 142/99 75 19 98.7 °F (37.1 °C) 98 %      MAP       --         Physical Exam    Constitutional: He appears well-developed and well-nourished. He is not diaphoretic. No distress.   Patient resting comfortably in stretcher.  NAD.  No  respiratory distress.   HENT:   Head: Normocephalic and atraumatic.   Right Ear: External ear normal.   Left Ear: External ear normal.   Nose: Nose normal.   Eyes: EOM are normal. Pupils are equal, round, and reactive to light. Right eye exhibits no discharge. Left eye exhibits no discharge.   Cardiovascular:  Normal rate, regular rhythm and normal heart sounds.     Exam reveals no gallop and no friction rub.       No murmur heard.  Pulmonary/Chest: Effort normal and breath sounds normal. No respiratory distress. He has no wheezes. He has no rhonchi. He has no rales. He exhibits no tenderness.   Abdominal: Abdomen is soft. Bowel sounds are normal. He exhibits no distension and no mass. There is no abdominal tenderness. There is no rebound and no guarding.   Musculoskeletal:         General: No edema. Normal range of motion.     Neurological: He is alert and oriented to person, place, and time. No cranial nerve deficit or sensory deficit.   Skin: Skin is warm and dry. Capillary refill takes less than 2 seconds.   Multiple flesh-colored papules to face, similar to comedones.  Did not appear to be erythematous or draining, do not appear to be infected.  Single papule on right buttock near inferior fold.  Mildly tender to palpation.  No surrounding erythema edema no central fluctuance.       ED Course   Procedures  Labs Reviewed   COMPREHENSIVE METABOLIC PANEL - Abnormal; Notable for the following components:       Result Value    Blood Urea Nitrogen 8.0 (*)     All other components within normal limits   CBC WITH DIFFERENTIAL - Abnormal; Notable for the following components:    RBC 4.53 (*)     Hgb 13.9 (*)     Hct 41.0 (*)     MPV 11.3 (*)     All other components within normal limits   CBC W/ AUTO DIFFERENTIAL    Narrative:     The following orders were created for panel order CBC auto differential.  Procedure                               Abnormality         Status                     ---------                                -----------         ------                     CBC with Differential[[id number]]        Abnormal            Final result                 Please view results for these tests on the individual orders.          Imaging Results    None          Medications   LETS (LIDOcaine-TETRAcaine-EPINEPHrine) gel solution ( Topical (Top) Given 5/24/23 0121)                 ED Course as of 05/24/23 0233   Wed May 24, 2023   0106 Chart review reveals that patient was seen on 05/22 for headache.  Similar to migraines that he had in the past.  Was eventually discharged home. [MM]   0225 CBC auto differential(!)  No leukocytosis, anemia at baseline. [MM]   0225 Comprehensive metabolic panel(!)  No electrolyte abnormality no renal dysfunction [MM]   0229 On re-evaluation patient is found to be sleeping comfortably.  NAD. [MM]      ED Course User Index  [MM] Mayur Torres MD          Medical Decision Making  Patient presents with nonspecific skin complaint.      Differential diagnosis to include but not limited to acne, allergic reaction, abscess, cellulitis.      Patient is awake alert well-appearing.  Presents some comedones to his face, possibly acne.  He is a single white pustule on his buttock.  Let is applied, using an 18 gauge needle the pustule is de roofed, and a scant, insignificant amount of pus is expressed.  No other significant findings.  Will place on Bactrim and discharge.  Patient is amenable to this plan.  His labs are unremarkable.  He is nontoxic appearing.  He was found to be sleeping comfortably on re-evaluation.  Suitable for discharge at this time. Return precautions given.  Questions invited, questions answered to the best my ability.  Patient discharged home condition stable.      Amount and/or Complexity of Data Reviewed  External Data Reviewed:      Details: Please see ED course  Labs: ordered. Decision-making details documented in ED Course.    Risk  Prescription drug management.              Clinical Impression:   Final diagnoses:  [R21] Rash and nonspecific skin eruption (Primary)  [L08.9] Pustule        ED Disposition Condition    Discharge Stable          ED Prescriptions       Medication Sig Dispense Start Date End Date Auth. Provider    sulfamethoxazole-trimethoprim 800-160mg (BACTRIM DS) 800-160 mg Tab Take 1 tablet by mouth 2 (two) times daily. for 7 days 14 tablet 5/24/2023 5/31/2023 Mayur Torres MD          Follow-up Information       Follow up With Specialties Details Why Contact Info    [facility]  Call   [address] [address] [phone]               Mayur Torres MD  05/24/23 2024

## 2023-05-24 NOTE — DISCHARGE INSTRUCTIONS
Thanks for letting us take care of you today!  It is our goal to give you courteous care and to keep you comfortable and informed, if you have any questions before you leave I will be happy to try and answer them.    Here is some advice after your visit:    Your visit in the emergency department is NOT definitive care - please follow-up with your primary care doctor and/or specialist within 1-2 days. Please return to the emergency department if you develop worsening symptoms including: fever, chills, chest pain, shortness of breath, weakness, numbness, tingling, nausea, vomiting, inability to eat, drink, or take your medication. Please return if you have any worsening in your condition or if you have any other concerns.    If you had radiology exams like an XRAY or CT in the emergency Department the interpreation on them may be preliminary - there may be less time sensitive findings on the reports please obtain these reports within 24 hours from the hospital or by using your out on your mobile phone to access records.  Bring these to your primary care doctor and/or specialist for further review of incidental findings.    Please review any LAB WORK from your visit today with your primary care physician.    Please Follow up with your primary care provider (PCP), if you do not have a PCP, the contact information for the [facility] family medicine clinic is provided, you may call to schedule an appointment to establish care.  You may also consider calling [phone] to schedule an appointment with an [facility] PCP.    Please take all antibiotics as prescribed

## 2023-05-29 ENCOUNTER — HOSPITAL ENCOUNTER (EMERGENCY)
Facility: HOSPITAL | Age: 33
Discharge: HOME | End: 2023-05-29
Attending: EMERGENCY MEDICINE
Payer: COMMERCIAL

## 2023-05-29 VITALS
TEMPERATURE: 98 F | HEART RATE: 79 BPM | SYSTOLIC BLOOD PRESSURE: 119 MMHG | RESPIRATION RATE: 18 BRPM | OXYGEN SATURATION: 98 % | DIASTOLIC BLOOD PRESSURE: 80 MMHG

## 2023-05-29 DIAGNOSIS — L30.9 DERMATITIS: Primary | ICD-10-CM

## 2023-05-29 PROCEDURE — 99283 EMERGENCY DEPT VISIT LOW MDM: CPT | Mod: 25

## 2023-05-29 RX ORDER — FAMOTIDINE 10 MG/ML
40 INJECTION INTRAVENOUS
Status: DISCONTINUED | OUTPATIENT
Start: 2023-05-29 | End: 2023-05-29

## 2023-05-29 RX ORDER — DEXAMETHASONE SODIUM PHOSPHATE 4 MG/ML
8 INJECTION, SOLUTION INTRA-ARTICULAR; INTRALESIONAL; INTRAMUSCULAR; INTRAVENOUS; SOFT TISSUE
Status: DISCONTINUED | OUTPATIENT
Start: 2023-05-29 | End: 2023-05-29

## 2023-05-29 RX ORDER — DIPHENHYDRAMINE HYDROCHLORIDE 50 MG/ML
50 INJECTION INTRAMUSCULAR; INTRAVENOUS
Status: DISCONTINUED | OUTPATIENT
Start: 2023-05-29 | End: 2023-05-29

## 2023-05-29 RX ORDER — FAMOTIDINE 20 MG/1
40 TABLET, FILM COATED ORAL
Status: DISCONTINUED | OUTPATIENT
Start: 2023-05-29 | End: 2023-05-29

## 2023-05-30 NOTE — ED PROVIDER NOTES
Encounter Date: 5/29/2023    SCRIBE #1 NOTE: I, Sofi Posey, am scribing for, and in the presence of,  Dillan Cm MD. I have scribed the entire note.     History     Chief Complaint   Patient presents with    Skin Problem     Pt arrives c/o face burning since this morning. Concerned for allergic reaction after using usual body wash on face. Denies swelling, mouth pain, rash. No relief after washing with water. Pt uses ASL.      33 year old male presents to the ED for skin issue. Pt is deaf, so  services were used. Pt states over the past 3 weeks he has noticed bumps that are popping up over his face. Pt works at [employer] in the kitchen, and believes these bumps are due to how much he works. Pt states these bumps do not burn or itch. Pt feels tired from being overworked.     The history is provided by the patient. The history is limited by a language barrier. A  was used.   Illness   The current episode started several weeks ago. The problem occurs rarely. The problem has been unchanged. Nothing relieves the symptoms. Pertinent negatives include no fever, no abdominal pain, no constipation, no diarrhea, no nausea, no vomiting, no congestion, no ear pain, no headaches, no cough, no shortness of breath, no wheezing and no discharge. He has received no recent medical care.   Review of patient's allergies indicates:  No Known Allergies  Past Medical History:   Diagnosis Date    Deaf     Hypertension     Migraines     Seizures      Past Surgical History:   Procedure Laterality Date    ABDOMINAL SURGERY      IMPLANTATION OF COCHLEAR PROSTHESIS      INNER EAR SURGERY Right      Family History   Problem Relation Age of Onset    Hypertension Mother      Social History     Tobacco Use    Smoking status: Heavy Smoker     Types: Cigars    Smokeless tobacco: Never   Substance Use Topics    Alcohol use: Not Currently    Drug use: Never     Review of Systems   Constitutional:  Negative for  chills and fever.   HENT:  Negative for congestion and ear pain.    Eyes:  Negative for discharge.   Respiratory:  Negative for cough, shortness of breath and wheezing.    Cardiovascular:  Negative for chest pain and leg swelling.   Gastrointestinal:  Negative for abdominal pain, constipation, diarrhea, nausea and vomiting.   Genitourinary:  Negative for dysuria, flank pain and frequency.   Musculoskeletal:  Negative for back pain and joint swelling.   Skin:         Bumps across face   Neurological:  Negative for dizziness, weakness and headaches.   Psychiatric/Behavioral:  Negative for agitation, confusion and hallucinations.      Physical Exam     Initial Vitals [05/29/23 2035]   BP Pulse Resp Temp SpO2   119/80 79 18 98 °F (36.7 °C) 98 %      MAP       --         Physical Exam    Nursing note and vitals reviewed.  Constitutional: He appears well-developed. No distress.   HENT:   Head: Normocephalic and atraumatic.   Mouth/Throat: Oropharynx is clear and moist.   Eyes: Conjunctivae and EOM are normal. Pupils are equal, round, and reactive to light.   Neck: Neck supple.   Cardiovascular:  Normal rate and regular rhythm.           No murmur heard.  Pulmonary/Chest: Breath sounds normal. No respiratory distress. He exhibits no tenderness.   Abdominal: Abdomen is soft. Bowel sounds are normal. He exhibits no distension. There is no abdominal tenderness.   Musculoskeletal:         General: Normal range of motion.      Cervical back: Neck supple.      Lumbar back: Normal. No tenderness. Normal range of motion.     Neurological: He is alert and oriented to person, place, and time. He has normal strength. No cranial nerve deficit or sensory deficit.   Skin:   Acne pustules across face expressing white caseous material. No hives or other wounds.    Psychiatric: He has a normal mood and affect. Judgment normal.       ED Course   Procedures  Labs Reviewed - No data to display       Imaging Results    None           Medications - No data to display           Scribe Attestation:   Scribe #1: I performed the above scribed service and the documentation accurately describes the services I performed. I attest to the accuracy of the note.    Attending Attestation:           Physician Attestation for Scribe:  Physician Attestation Statement for Scribe #1: I, Dillan Cm MD, reviewed documentation, as scribed by Sofi Posey in my presence, and it is both accurate and complete.         Medical Decision Making  The differential diagnosis includes, but is not limited to, allergic reaction, atopic dermatitis, or acne.                            Clinical Impression:   Final diagnoses:  [L30.9] Dermatitis (Primary)        ED Disposition Condition    Discharge Stable          ED Prescriptions    None       Follow-up Information       Follow up With Specialties Details Why Contact Info    PCP  Call in 1 day  follow up with PCP ni 1-2 days             Dillan Cm MD  06/14/23 0712

## 2023-05-30 NOTE — FIRST PROVIDER EVALUATION
Medical screening examination initiated.  I have conducted a focused provider triage encounter, findings are as follows:    Brief history of present illness:  Patient presents to emergency room saying that his face is burning    Vitals:    05/29/23 2035   BP: 119/80   BP Location: Left arm   Patient Position: Sitting   Pulse: 79   Resp: 18   Temp: 98 °F (36.7 °C)   TempSrc: Oral   SpO2: 98%       Pertinent physical exam:  Awake, alert, oriented x3.  Trachea midline.  No acute distress.  Movement all extremities.  Neuro intact.  Psych normal.    Brief workup plan:  Medicines and further evaluation by ED provider    Preliminary workup initiated; this workup will be continued and followed by the physician or advanced practice provider that is assigned to the patient when roomed.

## 2023-06-21 ENCOUNTER — HOSPITAL ENCOUNTER (EMERGENCY)
Facility: HOSPITAL | Age: 33
Discharge: HOME | End: 2023-06-21
Attending: FAMILY MEDICINE
Payer: COMMERCIAL

## 2023-06-21 VITALS
HEIGHT: 69 IN | SYSTOLIC BLOOD PRESSURE: 113 MMHG | WEIGHT: 200 LBS | TEMPERATURE: 98 F | BODY MASS INDEX: 29.62 KG/M2 | DIASTOLIC BLOOD PRESSURE: 79 MMHG | OXYGEN SATURATION: 99 % | HEART RATE: 58 BPM | RESPIRATION RATE: 20 BRPM

## 2023-06-21 DIAGNOSIS — G43.009 MIGRAINE WITHOUT AURA AND WITHOUT STATUS MIGRAINOSUS, NOT INTRACTABLE: ICD-10-CM

## 2023-06-21 DIAGNOSIS — R56.9 SEIZURE-LIKE ACTIVITY: Primary | ICD-10-CM

## 2023-06-21 LAB
ALBUMIN SERPL-MCNC: 4 G/DL (ref 3.5–5)
ALBUMIN/GLOB SERPL: 1.4 RATIO (ref 1.1–2)
ALP SERPL-CCNC: 55 UNIT/L (ref 40–150)
ALT SERPL-CCNC: 10 UNIT/L (ref 0–55)
AST SERPL-CCNC: 17 UNIT/L (ref 5–34)
BASOPHILS # BLD AUTO: 0.01 X10(3)/MCL
BASOPHILS NFR BLD AUTO: 0.2 %
BILIRUBIN DIRECT+TOT PNL SERPL-MCNC: 0.6 MG/DL
BUN SERPL-MCNC: 9.1 MG/DL (ref 8.9–20.6)
CALCIUM SERPL-MCNC: 9.1 MG/DL (ref 8.4–10.2)
CHLORIDE SERPL-SCNC: 106 MMOL/L (ref 98–107)
CO2 SERPL-SCNC: 23 MMOL/L (ref 22–29)
CREAT SERPL-MCNC: 0.76 MG/DL (ref 0.73–1.18)
EOSINOPHIL # BLD AUTO: 0.08 X10(3)/MCL (ref 0–0.9)
EOSINOPHIL NFR BLD AUTO: 1.8 %
ERYTHROCYTE [DISTWIDTH] IN BLOOD BY AUTOMATED COUNT: 12.1 % (ref 11.5–17)
GFR SERPLBLD CREATININE-BSD FMLA CKD-EPI: >60 MLS/MIN/1.73/M2
GLOBULIN SER-MCNC: 2.9 GM/DL (ref 2.4–3.5)
GLUCOSE SERPL-MCNC: 85 MG/DL (ref 74–100)
HCT VFR BLD AUTO: 38 % (ref 42–52)
HGB BLD-MCNC: 13.1 G/DL (ref 14–18)
IMM GRANULOCYTES # BLD AUTO: 0.01 X10(3)/MCL (ref 0–0.04)
IMM GRANULOCYTES NFR BLD AUTO: 0.2 %
LYMPHOCYTES # BLD AUTO: 2.15 X10(3)/MCL (ref 0.6–4.6)
LYMPHOCYTES NFR BLD AUTO: 47 %
MCH RBC QN AUTO: 30 PG (ref 27–31)
MCHC RBC AUTO-ENTMCNC: 34.5 G/DL (ref 33–36)
MCV RBC AUTO: 87 FL (ref 80–94)
MONOCYTES # BLD AUTO: 0.28 X10(3)/MCL (ref 0.1–1.3)
MONOCYTES NFR BLD AUTO: 6.1 %
NEUTROPHILS # BLD AUTO: 2.04 X10(3)/MCL (ref 2.1–9.2)
NEUTROPHILS NFR BLD AUTO: 44.7 %
NRBC BLD AUTO-RTO: 0 %
PLATELET # BLD AUTO: 181 X10(3)/MCL (ref 130–400)
PMV BLD AUTO: 11.4 FL (ref 7.4–10.4)
POTASSIUM SERPL-SCNC: 3.4 MMOL/L (ref 3.5–5.1)
PROT SERPL-MCNC: 6.9 GM/DL (ref 6.4–8.3)
RBC # BLD AUTO: 4.37 X10(6)/MCL (ref 4.7–6.1)
SODIUM SERPL-SCNC: 139 MMOL/L (ref 136–145)
WBC # SPEC AUTO: 4.57 X10(3)/MCL (ref 4.5–11.5)

## 2023-06-21 PROCEDURE — 96360 HYDRATION IV INFUSION INIT: CPT

## 2023-06-21 PROCEDURE — 25000003 PHARM REV CODE 250: Performed by: FAMILY MEDICINE

## 2023-06-21 PROCEDURE — 80053 COMPREHEN METABOLIC PANEL: CPT | Performed by: FAMILY MEDICINE

## 2023-06-21 PROCEDURE — 99284 EMERGENCY DEPT VISIT MOD MDM: CPT | Mod: 25

## 2023-06-21 PROCEDURE — 85025 COMPLETE CBC W/AUTO DIFF WBC: CPT | Performed by: FAMILY MEDICINE

## 2023-06-21 RX ORDER — DIVALPROEX SODIUM 500 MG/1
1000 TABLET, FILM COATED, EXTENDED RELEASE ORAL NIGHTLY
Qty: 60 TABLET | Refills: 0 | Status: SHIPPED | OUTPATIENT
Start: 2023-06-21 | End: 2024-03-11

## 2023-06-21 RX ORDER — ACETAMINOPHEN 500 MG
1000 TABLET ORAL
Status: COMPLETED | OUTPATIENT
Start: 2023-06-21 | End: 2023-06-21

## 2023-06-21 RX ORDER — DIVALPROEX SODIUM 250 MG/1
1000 TABLET, DELAYED RELEASE ORAL
Status: COMPLETED | OUTPATIENT
Start: 2023-06-21 | End: 2023-06-21

## 2023-06-21 RX ADMIN — DIVALPROEX SODIUM 1000 MG: 250 TABLET, DELAYED RELEASE ORAL at 09:06

## 2023-06-21 RX ADMIN — ACETAMINOPHEN 1000 MG: 500 TABLET, FILM COATED ORAL at 09:06

## 2023-06-21 RX ADMIN — SODIUM CHLORIDE 1000 ML: 9 INJECTION, SOLUTION INTRAVENOUS at 09:06

## 2023-06-22 NOTE — ED PROVIDER NOTES
"Encounter Date: 6/21/2023       History     Chief Complaint   Patient presents with    Seizures     Pt c/o seiuzre activity pta. Pt reports he hasn't taken medication "in awhile". Pt aaox4      33-year-old gentleman brought in the emergency room by ambulance for evaluation due to "seizure-like activity".  Patient has a history of seizures, currently on Depakote, but noncompliant with medications.  Denies fever chills.  Denies nausea or vomiting.  Reports today feeling fatigued with a headache following a seizure.    The history is provided by the patient.   Review of patient's allergies indicates:  No Known Allergies  Past Medical History:   Diagnosis Date    Deaf     Hypertension     Migraines     Seizures      Past Surgical History:   Procedure Laterality Date    ABDOMINAL SURGERY      IMPLANTATION OF COCHLEAR PROSTHESIS      INNER EAR SURGERY Right      Family History   Problem Relation Age of Onset    Hypertension Mother      Social History     Tobacco Use    Smoking status: Heavy Smoker     Types: Cigars    Smokeless tobacco: Never   Substance Use Topics    Alcohol use: Not Currently    Drug use: Never     Review of Systems   Constitutional:  Positive for fatigue. Negative for chills and fever.   HENT:  Negative for ear pain, rhinorrhea and sore throat.    Eyes:  Negative for photophobia and pain.   Respiratory:  Negative for cough, shortness of breath and wheezing.    Cardiovascular:  Negative for chest pain.   Gastrointestinal:  Negative for abdominal pain, diarrhea, nausea and vomiting.   Genitourinary:  Negative for dysuria.   Neurological:  Positive for seizures and headaches. Negative for dizziness and weakness.   All other systems reviewed and are negative.    Physical Exam     Initial Vitals [06/21/23 2047]   BP Pulse Resp Temp SpO2   (!) 145/104 72 20 97.8 °F (36.6 °C) 99 %      MAP       --         Physical Exam    Nursing note and vitals reviewed.  Constitutional: He appears well-developed and " well-nourished.   HENT:   Head: Normocephalic and atraumatic.   Eyes: EOM are normal. Pupils are equal, round, and reactive to light.   Neck: Neck supple.   Normal range of motion.  Cardiovascular:  Normal rate, regular rhythm, normal heart sounds and intact distal pulses.     Exam reveals no gallop and no friction rub.       No murmur heard.  Pulmonary/Chest: Breath sounds normal. No respiratory distress.   Abdominal: Abdomen is soft. Bowel sounds are normal. He exhibits no distension. There is no abdominal tenderness.   Musculoskeletal:         General: Normal range of motion.      Cervical back: Normal range of motion and neck supple.     Neurological: He is alert and oriented to person, place, and time. He has normal strength.   Skin: Skin is warm and dry.   Psychiatric: He has a normal mood and affect. His behavior is normal. Judgment and thought content normal.     ED Course   Procedures  Labs Reviewed   COMPREHENSIVE METABOLIC PANEL - Abnormal; Notable for the following components:       Result Value    Potassium Level 3.4 (*)     All other components within normal limits   CBC WITH DIFFERENTIAL - Abnormal; Notable for the following components:    RBC 4.37 (*)     Hgb 13.1 (*)     Hct 38.0 (*)     MPV 11.4 (*)     Neut # 2.04 (*)     All other components within normal limits   CBC W/ AUTO DIFFERENTIAL    Narrative:     The following orders were created for panel order CBC Auto Differential.  Procedure                               Abnormality         Status                     ---------                               -----------         ------                     CBC with Differential[[id number]]        Abnormal            Final result                 Please view results for these tests on the individual orders.   URINALYSIS, REFLEX TO URINE CULTURE   EXTRA TUBES    Narrative:     The following orders were created for panel order EXTRA TUBES.  Procedure                               Abnormality         Status    "                  ---------                               -----------         ------                     Light Blue Top Hold[[id number]]                              In process                 Gold Top Hold[[id number]]                                    In process                   Please view results for these tests on the individual orders.   LIGHT BLUE TOP HOLD   GOLD TOP HOLD          Imaging Results    None          Medications   sodium chloride 0.9% bolus 1,000 mL 1,000 mL (1,000 mLs Intravenous New Bag 6/21/23 2153)   divalproex EC tablet 1,000 mg (1,000 mg Oral Given 6/21/23 2153)   acetaminophen tablet 1,000 mg (1,000 mg Oral Given 6/21/23 2153)     Medical Decision Making:   Initial Assessment:   Patient is a 33-year-old gentleman presents emergency room with reported seizures.  No persons at bedside, inpatient reported to have "seizure-like activity".  Patient noncompliant with Depakote.  Currently appears well in no acute distress.  Patient is awake and alert.  Will obtain laboratory evaluation including CBC CMP to evaluate for electrolytes and anemia, will provide patient with home medication of Depakote.  Will provide treatment for his headache.  Will continue to monitor.  Differential Diagnosis:   Recurrent seizure, electrolyte abnormality, anemia, medication noncompliance           ED Course as of 06/21/23 2237 Wed Jun 21, 2023 2150 WBC: 4.57 [MW]   2150 Hemoglobin(!): 13.1 [MW]   2150 Hematocrit(!): 38.0 [MW]   2150 Platelets: 181 [MW]   2203 Sodium: 139 [MW]   2204 Potassium(!): 3.4 [MW]   2204 Chloride: 106 [MW]   2204 CO2: 23 [MW]   2204 Glucose: 85 [MW]   2204 BUN: 9.1 [MW]   2204 Creatinine: 0.76 [MW]   2204 Calcium: 9.1 [MW]   2204 PROTEIN TOTAL: 6.9 [MW]   2204 Albumin: 4.0 [MW]   2204 Globulin, Total: 2.9 [MW]   2204 Albumin/Globulin Ratio: 1.4 [MW]   2204 BILIRUBIN TOTAL: 0.6 [MW]   2204 Alkaline Phosphatase: 55 [MW]   2204 ALT: 10 [MW]   2204 AST: 17 [MW]   2204 eGFR: >60 [MW]   2235 No " acute abnormality appreciated; given dose of depakote.  No further seizures in the ED.  Stable for discharge to home. [MW]      ED Course User Index  [MW] Vineet Hogan MD                   Clinical Impression:   Final diagnoses:  [R56.9] Seizure-like activity (Primary)  [G43.009] Migraine without aura and without status migrainosus, not intractable        ED Disposition Condition    Discharge Stable          ED Prescriptions       Medication Sig Dispense Start Date End Date Auth. Provider    divalproex 500 MG Tb24 Take 2 tablets (1,000 mg total) by mouth nightly. 60 tablet 6/21/2023 7/21/2023 Vineet Hogan MD          Follow-up Information       Follow up With Specialties Details Why Contact Info    [facility] Emergency Dept Emergency Medicine  As needed, If symptoms worsen [address] [address] [phone]    Primary Care Physician  In 5 days               Vineet Hogan MD  06/21/23 1890

## 2023-06-30 ENCOUNTER — HOSPITAL ENCOUNTER (EMERGENCY)
Facility: HOSPITAL | Age: 33
Discharge: LEFT BEFORE DISCHARGE | End: 2023-07-01
Payer: COMMERCIAL

## 2023-06-30 VITALS
SYSTOLIC BLOOD PRESSURE: 119 MMHG | DIASTOLIC BLOOD PRESSURE: 82 MMHG | HEIGHT: 69 IN | HEART RATE: 84 BPM | WEIGHT: 206 LBS | RESPIRATION RATE: 18 BRPM | TEMPERATURE: 98 F | BODY MASS INDEX: 30.51 KG/M2 | OXYGEN SATURATION: 98 %

## 2023-06-30 DIAGNOSIS — R06.02 SHORTNESS OF BREATH: ICD-10-CM

## 2023-06-30 LAB
ALBUMIN SERPL-MCNC: 4.1 G/DL (ref 3.5–5)
ALBUMIN/GLOB SERPL: 1.5 RATIO (ref 1.1–2)
ALP SERPL-CCNC: 59 UNIT/L (ref 40–150)
ALT SERPL-CCNC: 13 UNIT/L (ref 0–55)
AST SERPL-CCNC: 18 UNIT/L (ref 5–34)
BASOPHILS # BLD AUTO: 0.01 X10(3)/MCL
BASOPHILS NFR BLD AUTO: 0.2 %
BILIRUBIN DIRECT+TOT PNL SERPL-MCNC: 0.9 MG/DL
BNP BLD-MCNC: <10 PG/ML
BUN SERPL-MCNC: 7.4 MG/DL (ref 8.9–20.6)
CALCIUM SERPL-MCNC: 9.2 MG/DL (ref 8.4–10.2)
CHLORIDE SERPL-SCNC: 106 MMOL/L (ref 98–107)
CO2 SERPL-SCNC: 24 MMOL/L (ref 22–29)
CREAT SERPL-MCNC: 0.8 MG/DL (ref 0.73–1.18)
EOSINOPHIL # BLD AUTO: 0.03 X10(3)/MCL (ref 0–0.9)
EOSINOPHIL NFR BLD AUTO: 0.6 %
ERYTHROCYTE [DISTWIDTH] IN BLOOD BY AUTOMATED COUNT: 12.9 % (ref 11.5–17)
GFR SERPLBLD CREATININE-BSD FMLA CKD-EPI: >60 MLS/MIN/1.73/M2
GLOBULIN SER-MCNC: 2.7 GM/DL (ref 2.4–3.5)
GLUCOSE SERPL-MCNC: 85 MG/DL (ref 74–100)
HCT VFR BLD AUTO: 39.1 % (ref 42–52)
HGB BLD-MCNC: 13.6 G/DL (ref 14–18)
IMM GRANULOCYTES # BLD AUTO: 0 X10(3)/MCL (ref 0–0.04)
IMM GRANULOCYTES NFR BLD AUTO: 0 %
INR BLD: 1.11 (ref 0–1.3)
LYMPHOCYTES # BLD AUTO: 1.9 X10(3)/MCL (ref 0.6–4.6)
LYMPHOCYTES NFR BLD AUTO: 39.6 %
MCH RBC QN AUTO: 30.6 PG (ref 27–31)
MCHC RBC AUTO-ENTMCNC: 34.8 G/DL (ref 33–36)
MCV RBC AUTO: 87.9 FL (ref 80–94)
MONOCYTES # BLD AUTO: 0.33 X10(3)/MCL (ref 0.1–1.3)
MONOCYTES NFR BLD AUTO: 6.9 %
NEUTROPHILS # BLD AUTO: 2.53 X10(3)/MCL (ref 2.1–9.2)
NEUTROPHILS NFR BLD AUTO: 52.7 %
NRBC BLD AUTO-RTO: 0 %
PLATELET # BLD AUTO: 192 X10(3)/MCL (ref 130–400)
PMV BLD AUTO: 11.4 FL (ref 7.4–10.4)
POTASSIUM SERPL-SCNC: 3.6 MMOL/L (ref 3.5–5.1)
PROT SERPL-MCNC: 6.8 GM/DL (ref 6.4–8.3)
PROTHROMBIN TIME: 14.2 SECONDS (ref 12.5–14.5)
RBC # BLD AUTO: 4.45 X10(6)/MCL (ref 4.7–6.1)
SODIUM SERPL-SCNC: 136 MMOL/L (ref 136–145)
TROPONIN I SERPL-MCNC: <0.01 NG/ML (ref 0–0.04)
WBC # SPEC AUTO: 4.8 X10(3)/MCL (ref 4.5–11.5)

## 2023-06-30 PROCEDURE — 84484 ASSAY OF TROPONIN QUANT: CPT | Performed by: STUDENT IN AN ORGANIZED HEALTH CARE EDUCATION/TRAINING PROGRAM

## 2023-06-30 PROCEDURE — 93010 EKG 12-LEAD: ICD-10-PCS | Mod: ,,, | Performed by: INTERNAL MEDICINE

## 2023-06-30 PROCEDURE — 85025 COMPLETE CBC W/AUTO DIFF WBC: CPT | Performed by: STUDENT IN AN ORGANIZED HEALTH CARE EDUCATION/TRAINING PROGRAM

## 2023-06-30 PROCEDURE — 93005 ELECTROCARDIOGRAM TRACING: CPT

## 2023-06-30 PROCEDURE — 99285 EMERGENCY DEPT VISIT HI MDM: CPT | Mod: 25

## 2023-06-30 PROCEDURE — 85610 PROTHROMBIN TIME: CPT | Performed by: STUDENT IN AN ORGANIZED HEALTH CARE EDUCATION/TRAINING PROGRAM

## 2023-06-30 PROCEDURE — 83880 ASSAY OF NATRIURETIC PEPTIDE: CPT | Performed by: STUDENT IN AN ORGANIZED HEALTH CARE EDUCATION/TRAINING PROGRAM

## 2023-06-30 PROCEDURE — 80053 COMPREHEN METABOLIC PANEL: CPT | Performed by: STUDENT IN AN ORGANIZED HEALTH CARE EDUCATION/TRAINING PROGRAM

## 2023-06-30 PROCEDURE — 93010 ELECTROCARDIOGRAM REPORT: CPT | Mod: ,,, | Performed by: INTERNAL MEDICINE

## 2023-08-09 ENCOUNTER — HOSPITAL ENCOUNTER (EMERGENCY)
Facility: HOSPITAL | Age: 33
Discharge: HOME | End: 2023-08-10
Attending: EMERGENCY MEDICINE
Payer: COMMERCIAL

## 2023-08-09 VITALS
HEART RATE: 75 BPM | SYSTOLIC BLOOD PRESSURE: 115 MMHG | DIASTOLIC BLOOD PRESSURE: 77 MMHG | WEIGHT: 167.56 LBS | OXYGEN SATURATION: 99 % | HEIGHT: 69 IN | TEMPERATURE: 98 F | BODY MASS INDEX: 24.82 KG/M2 | RESPIRATION RATE: 18 BRPM

## 2023-08-09 DIAGNOSIS — T14.8XXA ABRASION OF SKIN: Primary | ICD-10-CM

## 2023-08-09 PROCEDURE — 99283 EMERGENCY DEPT VISIT LOW MDM: CPT

## 2023-08-10 RX ORDER — MUPIROCIN 20 MG/G
OINTMENT TOPICAL 3 TIMES DAILY
Qty: 30 G | Refills: 0 | Status: SHIPPED | OUTPATIENT
Start: 2023-08-10 | End: 2023-08-17

## 2023-08-10 NOTE — ED PROVIDER NOTES
"Encounter Date: 8/9/2023       History     Chief Complaint   Patient presents with    Facial Laceration     Pt states laceration to left naris (x)6 days. No active bleeding at this time. Patient "deaf" but stated he can read lips. nadn     Patient with pmhx as delineated below presents today with skin wound to right naris. He is unsure how long it has been there. Reports pain. He has been putting an otc ointment daily with no improvement.     The history is provided by the patient. No  was used.     Review of patient's allergies indicates:  No Known Allergies  Past Medical History:   Diagnosis Date    Deaf     Hypertension     Migraines     Seizures      Past Surgical History:   Procedure Laterality Date    ABDOMINAL SURGERY      IMPLANTATION OF COCHLEAR PROSTHESIS      INNER EAR SURGERY Right      Family History   Problem Relation Age of Onset    Hypertension Mother      Social History     Tobacco Use    Smoking status: Heavy Smoker     Current packs/day: 0.00     Types: Cigars    Smokeless tobacco: Never   Substance Use Topics    Alcohol use: Not Currently    Drug use: Never     Review of Systems   Constitutional:  Negative for chills and fever.   Respiratory:  Negative for cough, chest tightness and shortness of breath.    Cardiovascular:  Negative for chest pain.   Gastrointestinal:  Negative for abdominal pain, nausea and vomiting.   Genitourinary:  Negative for flank pain.   Musculoskeletal:  Negative for arthralgias and myalgias.   Skin:  Negative for rash.   Neurological:  Negative for syncope, light-headedness and headaches.       Physical Exam     Initial Vitals [08/09/23 2248]   BP Pulse Resp Temp SpO2   115/77 75 18 97.9 °F (36.6 °C) 99 %      MAP       --         Physical Exam    Nursing note and vitals reviewed.  Constitutional: He appears well-developed and well-nourished. He is not diaphoretic. No distress.   HENT:   Head: Normocephalic and atraumatic.   Mouth/Throat: Oropharynx " is clear and moist. No oropharyngeal exudate.   Small scab noted to interior of right naris.   Eyes: Conjunctivae and EOM are normal.   Neck: Neck supple.   Normal range of motion.  Cardiovascular:  Normal rate, regular rhythm, normal heart sounds and intact distal pulses.           Pulmonary/Chest: Breath sounds normal. No respiratory distress.   Abdominal: Abdomen is soft. He exhibits no distension. There is no abdominal tenderness. There is no rebound and no guarding.   Musculoskeletal:         General: No edema.      Cervical back: Normal range of motion and neck supple.     Neurological: He is alert and oriented to person, place, and time. GCS score is 15. GCS eye subscore is 4. GCS verbal subscore is 5. GCS motor subscore is 6.   Skin: Skin is warm and dry. Capillary refill takes less than 2 seconds. No rash noted.   Psychiatric: He has a normal mood and affect.         ED Course   Procedures  Labs Reviewed - No data to display       Imaging Results    None          Medications - No data to display  Medical Decision Making:   History:   Old Records Summarized: other records.  Differential Diagnosis:   Abrasion, laceration, cellulitis, abscess   Patient is non-toxic appearing. Vitals stable. Recommend mupirocin ointment. Advised to f/u with pcp. Stable for discharge. ED precautions given.                       Clinical Impression:   Final diagnoses:  [T14.8XXA] Abrasion of skin (Primary)        ED Disposition Condition    Discharge Stable          ED Prescriptions       Medication Sig Dispense Start Date End Date Auth. Provider    mupirocin (BACTROBAN) 2 % ointment Apply topically 3 (three) times daily. for 7 days 30 g 8/10/2023 8/17/2023 Whitney Martines PA          Follow-up Information       Follow up With Specialties Details Why Contact Info    [facility] - Emergency Dept Emergency Medicine  If symptoms worsen return to ED immediately [address] [facility]  [address]  [phone]    Primary Care Provider within 1-2 days  Go in 2 days               Whitney Martines PA  08/10/23 0023

## 2023-08-20 VITALS
OXYGEN SATURATION: 98 % | HEIGHT: 69 IN | HEART RATE: 88 BPM | RESPIRATION RATE: 17 BRPM | DIASTOLIC BLOOD PRESSURE: 86 MMHG | WEIGHT: 167.31 LBS | SYSTOLIC BLOOD PRESSURE: 131 MMHG | TEMPERATURE: 98 F | BODY MASS INDEX: 24.78 KG/M2

## 2023-08-20 PROCEDURE — 99900041 HC LEFT WITHOUT BEING SEEN- EMERGENCY

## 2023-08-21 ENCOUNTER — HOSPITAL ENCOUNTER (EMERGENCY)
Facility: HOSPITAL | Age: 33
Discharge: LEFT BEFORE DISCHARGE | End: 2023-08-21
Payer: COMMERCIAL

## 2023-10-07 ENCOUNTER — HOSPITAL ENCOUNTER (EMERGENCY)
Facility: HOSPITAL | Age: 33
Discharge: HOME | End: 2023-10-07
Attending: EMERGENCY MEDICINE
Payer: COMMERCIAL

## 2023-10-07 VITALS
BODY MASS INDEX: 24.82 KG/M2 | RESPIRATION RATE: 18 BRPM | TEMPERATURE: 99 F | DIASTOLIC BLOOD PRESSURE: 74 MMHG | OXYGEN SATURATION: 100 % | SYSTOLIC BLOOD PRESSURE: 123 MMHG | HEIGHT: 69 IN | HEART RATE: 57 BPM | WEIGHT: 167.56 LBS

## 2023-10-07 DIAGNOSIS — T30.0 BURN: Primary | ICD-10-CM

## 2023-10-07 LAB
AMPHET UR QL SCN: NEGATIVE
ANION GAP SERPL CALC-SCNC: 9 MEQ/L
APPEARANCE UR: CLEAR
BACTERIA #/AREA URNS AUTO: ABNORMAL /HPF
BARBITURATE SCN PRESENT UR: NEGATIVE
BASOPHILS # BLD AUTO: 0.01 X10(3)/MCL
BASOPHILS NFR BLD AUTO: 0.3 %
BENZODIAZ UR QL SCN: NEGATIVE
BILIRUB UR QL STRIP.AUTO: NEGATIVE
BUN SERPL-MCNC: 8.1 MG/DL (ref 8.9–20.6)
CALCIUM SERPL-MCNC: 9.4 MG/DL (ref 8.4–10.2)
CANNABINOIDS UR QL SCN: NEGATIVE
CHLORIDE SERPL-SCNC: 106 MMOL/L (ref 98–107)
CO2 SERPL-SCNC: 26 MMOL/L (ref 22–29)
COCAINE UR QL SCN: NEGATIVE
COLOR UR AUTO: YELLOW
CORTIS SERPL-SCNC: 6.5 UG/DL
CREAT SERPL-MCNC: 0.84 MG/DL (ref 0.73–1.18)
CREAT/UREA NIT SERPL: 10
EOSINOPHIL # BLD AUTO: 0.06 X10(3)/MCL (ref 0–0.9)
EOSINOPHIL NFR BLD AUTO: 1.6 %
ERYTHROCYTE [DISTWIDTH] IN BLOOD BY AUTOMATED COUNT: 13.3 % (ref 11.5–17)
FENTANYL UR QL SCN: NEGATIVE
GFR SERPLBLD CREATININE-BSD FMLA CKD-EPI: >60 MLS/MIN/1.73/M2
GLUCOSE SERPL-MCNC: 69 MG/DL (ref 74–100)
GLUCOSE UR QL STRIP.AUTO: NORMAL
HCT VFR BLD AUTO: 38.9 % (ref 42–52)
HGB BLD-MCNC: 13.1 G/DL (ref 14–18)
HYALINE CASTS #/AREA URNS LPF: ABNORMAL /LPF
IMM GRANULOCYTES # BLD AUTO: 0.01 X10(3)/MCL (ref 0–0.04)
IMM GRANULOCYTES NFR BLD AUTO: 0.3 %
KETONES UR QL STRIP.AUTO: NEGATIVE
LEUKOCYTE ESTERASE UR QL STRIP.AUTO: NEGATIVE
LYMPHOCYTES # BLD AUTO: 2.17 X10(3)/MCL (ref 0.6–4.6)
LYMPHOCYTES NFR BLD AUTO: 57 %
MAGNESIUM SERPL-MCNC: 1.9 MG/DL (ref 1.6–2.6)
MCH RBC QN AUTO: 30.2 PG (ref 27–31)
MCHC RBC AUTO-ENTMCNC: 33.7 G/DL (ref 33–36)
MCV RBC AUTO: 89.6 FL (ref 80–94)
MDMA UR QL SCN: NEGATIVE
MONOCYTES # BLD AUTO: 0.31 X10(3)/MCL (ref 0.1–1.3)
MONOCYTES NFR BLD AUTO: 8.1 %
MUCOUS THREADS URNS QL MICRO: ABNORMAL /LPF
NEUTROPHILS # BLD AUTO: 1.25 X10(3)/MCL (ref 2.1–9.2)
NEUTROPHILS NFR BLD AUTO: 32.7 %
NITRITE UR QL STRIP.AUTO: NEGATIVE
NRBC BLD AUTO-RTO: 0 %
OPIATES UR QL SCN: NEGATIVE
PCP UR QL: NEGATIVE
PH UR STRIP.AUTO: 6 [PH]
PH UR: 6 [PH] (ref 3–11)
PLATELET # BLD AUTO: 154 X10(3)/MCL (ref 130–400)
PMV BLD AUTO: 11.9 FL (ref 7.4–10.4)
POTASSIUM SERPL-SCNC: 3.6 MMOL/L (ref 3.5–5.1)
PROT UR QL STRIP.AUTO: NEGATIVE
RBC # BLD AUTO: 4.34 X10(6)/MCL (ref 4.7–6.1)
RBC #/AREA URNS AUTO: ABNORMAL /HPF
RBC UR QL AUTO: NEGATIVE
SODIUM SERPL-SCNC: 141 MMOL/L (ref 136–145)
SP GR UR STRIP.AUTO: 1.01 (ref 1–1.03)
SQUAMOUS #/AREA URNS LPF: ABNORMAL /HPF
TSH SERPL-ACNC: 1.16 UIU/ML (ref 0.35–4.94)
UROBILINOGEN UR STRIP-ACNC: ABNORMAL
WBC # SPEC AUTO: 3.81 X10(3)/MCL (ref 4.5–11.5)
WBC #/AREA URNS AUTO: ABNORMAL /HPF

## 2023-10-07 PROCEDURE — 82533 TOTAL CORTISOL: CPT | Performed by: EMERGENCY MEDICINE

## 2023-10-07 PROCEDURE — 99284 EMERGENCY DEPT VISIT MOD MDM: CPT | Mod: 25

## 2023-10-07 PROCEDURE — 84443 ASSAY THYROID STIM HORMONE: CPT | Performed by: EMERGENCY MEDICINE

## 2023-10-07 PROCEDURE — 83735 ASSAY OF MAGNESIUM: CPT | Performed by: EMERGENCY MEDICINE

## 2023-10-07 PROCEDURE — 85025 COMPLETE CBC W/AUTO DIFF WBC: CPT | Performed by: EMERGENCY MEDICINE

## 2023-10-07 PROCEDURE — 81001 URINALYSIS AUTO W/SCOPE: CPT | Mod: 59 | Performed by: EMERGENCY MEDICINE

## 2023-10-07 PROCEDURE — 96360 HYDRATION IV INFUSION INIT: CPT

## 2023-10-07 PROCEDURE — 80307 DRUG TEST PRSMV CHEM ANLYZR: CPT | Performed by: EMERGENCY MEDICINE

## 2023-10-07 PROCEDURE — 80048 BASIC METABOLIC PNL TOTAL CA: CPT | Performed by: EMERGENCY MEDICINE

## 2023-10-07 PROCEDURE — 25000003 PHARM REV CODE 250: Performed by: EMERGENCY MEDICINE

## 2023-10-07 RX ORDER — SILVER SULFADIAZINE 10 G/1000G
1 CREAM TOPICAL
Status: COMPLETED | OUTPATIENT
Start: 2023-10-07 | End: 2023-10-07

## 2023-10-07 RX ORDER — SILVER SULFADIAZINE 10 G/1000G
CREAM TOPICAL 2 TIMES DAILY
Qty: 20 G | Refills: 0 | Status: SHIPPED | OUTPATIENT
Start: 2023-10-07 | End: 2023-10-12

## 2023-10-07 RX ADMIN — SODIUM CHLORIDE 1000 ML: 9 INJECTION, SOLUTION INTRAVENOUS at 07:10

## 2023-10-07 RX ADMIN — SILVER SULFADIAZINE 1 TUBE: 10 CREAM TOPICAL at 07:10

## 2023-10-07 NOTE — ED PROVIDER NOTES
Encounter Date: 10/7/2023       History     Chief Complaint   Patient presents with    Burn    Fatigue     C/o fatigue x 3 days, also states burned his  right hand on toaster at work     Right hand burn (2 days ago, toaster at work); feels tired, appears mildly dry.      Burn  The patient complains of a thermal burn. The incident occurred two days ago. The incident occurred at work. Context: on toaster at work (kennedi pat) ; He came to the ER via personal transport. Head/neck injury location: dosrum right hand. Pertinent negatives include no altered mental status, no numbness, no abdominal pain, no bowel incontinence, no nausea, no vomiting, no bladder incontinence, no focal weakness and no decreased responsiveness. There have been no prior injuries to these areas. He has been Behaving normally. There were sick contacts none.     Review of patient's allergies indicates:  No Known Allergies  Past Medical History:   Diagnosis Date    Deaf     Hypertension     Migraines     Seizures      Past Surgical History:   Procedure Laterality Date    ABDOMINAL SURGERY      IMPLANTATION OF COCHLEAR PROSTHESIS      INNER EAR SURGERY Right      Family History   Problem Relation Age of Onset    Hypertension Mother      Social History     Tobacco Use    Smoking status: Heavy Smoker     Types: Cigars    Smokeless tobacco: Never   Substance Use Topics    Alcohol use: Not Currently    Drug use: Never     Review of Systems   Constitutional:  Negative for decreased responsiveness.   Gastrointestinal:  Negative for abdominal pain, bowel incontinence, nausea and vomiting.   Genitourinary:  Negative for bladder incontinence.   Neurological:  Negative for focal weakness and numbness.   All other systems reviewed and are negative.      Physical Exam     Initial Vitals   BP Pulse Resp Temp SpO2   10/07/23 0706 10/07/23 0706 10/07/23 0706 10/07/23 0705 10/07/23 0706   124/81 (!) 54 20 97.3 °F (36.3 °C) 100 %      MAP       --                 Physical Exam    Nursing note and vitals reviewed.  Constitutional: He appears well-developed and well-nourished. He is not diaphoretic. No distress.   HENT:   Head: Normocephalic and atraumatic.   Eyes: EOM are normal. Pupils are equal, round, and reactive to light. Right eye exhibits no discharge. Left eye exhibits no discharge.   Neck: Neck supple. No thyromegaly present. No tracheal deviation present. No JVD present.   Normal range of motion.  Cardiovascular:  Normal rate, regular rhythm, normal heart sounds and intact distal pulses.           No murmur heard.  Pulmonary/Chest: Breath sounds normal. No stridor. No respiratory distress. He has no wheezes. He has no rhonchi. He has no rales.   Abdominal: Abdomen is soft. He exhibits no distension. There is no abdominal tenderness. There is no rebound and no guarding.   Musculoskeletal:         General: No tenderness or edema. Normal range of motion.      Cervical back: Normal range of motion and neck supple.     Neurological: He is alert and oriented to person, place, and time. He has normal strength. No cranial nerve deficit. GCS score is 15. GCS eye subscore is 4. GCS verbal subscore is 5. GCS motor subscore is 6.   Skin: Capillary refill takes less than 2 seconds. No rash and no abscess noted. No erythema. No pallor.   0.125 BSA burn to dorsum right hand; partial thickness, normal sensation ;   Psychiatric: He has a normal mood and affect. His behavior is normal. Judgment and thought content normal.         ED Course   Procedures  Labs Reviewed   BASIC METABOLIC PANEL - Abnormal; Notable for the following components:       Result Value    Glucose Level 69 (*)     Blood Urea Nitrogen 8.1 (*)     All other components within normal limits   URINALYSIS, REFLEX TO URINE CULTURE - Abnormal; Notable for the following components:    Urobilinogen, UA 3+ (*)     Mucous, UA Trace (*)     All other components within normal limits   CBC WITH DIFFERENTIAL - Abnormal;  Notable for the following components:    WBC 3.81 (*)     RBC 4.34 (*)     Hgb 13.1 (*)     Hct 38.9 (*)     MPV 11.9 (*)     Neut # 1.25 (*)     All other components within normal limits   DRUG SCREEN, URINE (BEAKER) - Normal    Narrative:     Cut off concentrations:    Amphetamines - 1000 ng/ml  Barbiturates - 200 ng/ml  Benzodiazepine - 200 ng/ml  Cannabinoids (THC) - 50 ng/ml  Cocaine - 300 ng/ml  Fentanyl - 1.0 ng/ml  MDMA - 500 ng/ml  Opiates - 300 ng/ml   Phencyclidine (PCP) - 25 ng/ml    Specimen submitted for drug analysis and tested for pH and specific gravity in order to evaluate sample integrity. Suspect tampering if specific gravity is <1.003 and/or pH is not within the range of 4.5 - 8.0  False negatives may result form substances such as bleach added to urine.  False positives may result for the presence of a substance with similar chemical structure to the drug or its metabolite.    This test provides only a PRELIMINARY analytical test result. A more specific alternate chemical method must be used in order to obtain a confirmed analytical result. Gas chromatography/mass spectrometry (GC/MS) is the preferred confirmatory method. Other chemical confirmation methods are available. Clinical consideration and professional judgement should be applied to any drug of abuse test result, particularly when preliminary positive results are used.    Positive results will be confirmed only at the physicians request. Unconfirmed screening results are to be used only for medical purposes (treatment).        MAGNESIUM - Normal   TSH - Normal   CBC W/ AUTO DIFFERENTIAL    Narrative:     The following orders were created for panel order CBC auto differential.  Procedure                               Abnormality         Status                     ---------                               -----------         ------                     CBC with Differential[[id number]]        Abnormal            Final result                  Please view results for these tests on the individual orders.   EXTRA TUBES    Narrative:     The following orders were created for panel order EXTRA TUBES.  Procedure                               Abnormality         Status                     ---------                               -----------         ------                     Light Blue Top Hold[[id number]]                              In process                 Light Green Top Hold[[id number]]                             In process                 Lavender Top Hold[[id number]]                                In process                 Gold Top Hold[[id number]]                                    In process                   Please view results for these tests on the individual orders.   LIGHT BLUE TOP HOLD   LIGHT GREEN TOP HOLD   LAVENDER TOP HOLD   GOLD TOP HOLD   CORTISOL, RANDOM          Imaging Results    None          Medications   sodium chloride 0.9% bolus 1,000 mL 1,000 mL (1,000 mLs Intravenous New Bag 10/7/23 0750)   silver sulfADIAZINE 1% cream 1 Tube (1 Tube Topical (Top) Given 10/7/23 0751)     Medical Decision Making  Presents requesting ointment for burn. Patient initially mildly somnolent (though early); mental status resolves to normal after a brief nap. Lab data are found reassuring.     Amount and/or Complexity of Data Reviewed  Labs: ordered. Decision-making details documented in ED Course.    Risk  Prescription drug management.  Risk Details: Stable for out patient supervision, silvadene for burn.               ED Course as of 10/07/23 1101   Sat Oct 07, 2023   0833 Reassuring hemogram ; [CT]   0833 Generally reassuring chemistries (mild hypoglycemia) ; [CT]   0954 Tsh normal ; [CT]   1043 Reassuring ua; [CT]   1054 Utox negative by pcr ; [CT]      ED Course User Index  [CT] Mc Juan MD                    Clinical Impression:   Final diagnoses:  [T30.0] Burn (Primary)        ED Disposition Condition    Discharge Stable           ED Prescriptions       Medication Sig Dispense Start Date End Date Auth. Provider    silver sulfADIAZINE 1% (SILVADENE) 1 % cream Apply topically 2 (two) times daily. for 5 days 20 g 10/7/2023 10/12/2023 Mc Juan MD          Follow-up Information       Follow up With Specialties Details Why Contact Info    [facility] - Emergency Dept Emergency Medicine  As needed, If symptoms worsen [address] [facility] [address] [phone]             Mc Juan MD  10/07/23 0640

## 2023-11-12 ENCOUNTER — HOSPITAL ENCOUNTER (EMERGENCY)
Facility: HOSPITAL | Age: 33
Discharge: HOME | End: 2023-11-12
Attending: EMERGENCY MEDICINE
Payer: COMMERCIAL

## 2023-11-12 VITALS
DIASTOLIC BLOOD PRESSURE: 84 MMHG | BODY MASS INDEX: 22.53 KG/M2 | OXYGEN SATURATION: 100 % | SYSTOLIC BLOOD PRESSURE: 129 MMHG | HEIGHT: 69 IN | TEMPERATURE: 98 F | RESPIRATION RATE: 18 BRPM | HEART RATE: 64 BPM | WEIGHT: 152.13 LBS

## 2023-11-12 DIAGNOSIS — R51.9 ACUTE NONINTRACTABLE HEADACHE, UNSPECIFIED HEADACHE TYPE: Primary | ICD-10-CM

## 2023-11-12 PROCEDURE — 96372 THER/PROPH/DIAG INJ SC/IM: CPT | Performed by: NURSE PRACTITIONER

## 2023-11-12 PROCEDURE — 63600175 PHARM REV CODE 636 W HCPCS: Performed by: NURSE PRACTITIONER

## 2023-11-12 PROCEDURE — 99284 EMERGENCY DEPT VISIT MOD MDM: CPT

## 2023-11-12 RX ORDER — KETOROLAC TROMETHAMINE 30 MG/ML
30 INJECTION, SOLUTION INTRAMUSCULAR; INTRAVENOUS
Status: COMPLETED | OUTPATIENT
Start: 2023-11-12 | End: 2023-11-12

## 2023-11-12 RX ORDER — IBUPROFEN 800 MG/1
800 TABLET ORAL EVERY 8 HOURS PRN
Qty: 20 TABLET | Refills: 0 | Status: SHIPPED | OUTPATIENT
Start: 2023-11-12 | End: 2024-03-11

## 2023-11-12 RX ORDER — METOCLOPRAMIDE HYDROCHLORIDE 5 MG/ML
10 INJECTION INTRAMUSCULAR; INTRAVENOUS
Status: COMPLETED | OUTPATIENT
Start: 2023-11-12 | End: 2023-11-12

## 2023-11-12 RX ADMIN — METOCLOPRAMIDE HYDROCHLORIDE 10 MG: 5 INJECTION INTRAMUSCULAR; INTRAVENOUS at 07:11

## 2023-11-12 RX ADMIN — KETOROLAC TROMETHAMINE 30 MG: 30 INJECTION, SOLUTION INTRAMUSCULAR; INTRAVENOUS at 07:11

## 2023-11-12 NOTE — Clinical Note
"Chun Patel" Amado was seen and treated in our emergency department on 11/12/2023.  He may return to work on 11/15/2023.       If you have any questions or concerns, please don't hesitate to call.      Cosme King Jr., FNP"

## 2023-11-12 NOTE — Clinical Note
"Chun Patel" Amado was seen and treated in our emergency department on 11/12/2023.  He may return to work on 11/13/2023.       If you have any questions or concerns, please don't hesitate to call.      Carmen WISE    "

## 2023-11-13 NOTE — ED PROVIDER NOTES
"Encounter Date: 11/12/2023       History     Chief Complaint   Patient presents with    Headache     Arrived via EMS c/o headache and "stressed out" after verbal fight with manager at work.      Pt is a 33 y.o. male who presents to the [facility] ED complaining of headache after he became stressed by his manager at work. Hx of migraines headaches, [place], HTN, and seizure disorder. Denies SI, HI, auditory hallucinations, or visual hallucinations. Admits that when he becomes stressed sometimes it makes his migraines happen. Denies having a PCP at this time for follow up. Denies chest pain, SOB, weakness, dizziness, or blurred vision.      Review of patient's allergies indicates:  No Known Allergies  Past Medical History:   Diagnosis Date    Deaf     Hypertension     Migraines     Seizures      Past Surgical History:   Procedure Laterality Date    ABDOMINAL SURGERY      IMPLANTATION OF COCHLEAR PROSTHESIS      INNER EAR SURGERY Right      Family History   Problem Relation Age of Onset    Hypertension Mother      Social History     Tobacco Use    Smoking status: Heavy Smoker     Types: Cigars    Smokeless tobacco: Never   Substance Use Topics    Alcohol use: Not Currently    Drug use: Never     Review of Systems   Constitutional:  Negative for chills, diaphoresis, fatigue and fever.   HENT:  Negative for facial swelling, postnasal drip, rhinorrhea, sinus pressure, sinus pain, sore throat and trouble swallowing.    Respiratory:  Negative for cough, chest tightness, shortness of breath and wheezing.    Cardiovascular:  Negative for chest pain, palpitations and leg swelling.   Gastrointestinal:  Negative for abdominal pain, diarrhea, nausea and vomiting.   Genitourinary:  Negative for dysuria, flank pain, hematuria and urgency.   Musculoskeletal:  Negative for arthralgias, back pain and myalgias.   Skin:  Negative for color change and rash.   Neurological:  Positive for headaches. Negative for dizziness, syncope and weakness. "   Hematological:  Does not bruise/bleed easily.   All other systems reviewed and are negative.      Physical Exam     Initial Vitals [11/12/23 1925]   BP Pulse Resp Temp SpO2   129/84 64 17 97.7 °F (36.5 °C) 100 %      MAP       --         Physical Exam    Nursing note and vitals reviewed.  Constitutional: Vital signs are normal. He appears well-developed and well-nourished.   HENT:   Head: Normocephalic.       Nose: Nose normal.   Mouth/Throat: Oropharynx is clear and moist.   Eyes: Conjunctivae and EOM are normal. Pupils are equal, round, and reactive to light.   Neck: Neck supple.   Normal range of motion.  Cardiovascular:  Normal rate, regular rhythm, normal heart sounds and intact distal pulses.           Pulmonary/Chest: Effort normal and breath sounds normal. No respiratory distress. He has no wheezes. He has no rhonchi. He has no rales. He exhibits no tenderness.   Abdominal: Abdomen is soft and flat. Bowel sounds are normal. There is no abdominal tenderness. There is no rebound, no guarding, no tenderness at McBurney's point and negative Rivera's sign.   Musculoskeletal:         General: Normal range of motion.      Cervical back: Normal range of motion and neck supple.     Neurological: He is alert and oriented to person, place, and time. He has normal strength.   Skin: Skin is warm and dry. Capillary refill takes less than 2 seconds.   Psychiatric: He has a normal mood and affect. His behavior is normal. Judgment and thought content normal.         ED Course   Procedures  Labs Reviewed - No data to display       Imaging Results    None          Medications   ketorolac injection 30 mg (30 mg Intramuscular Given 11/12/23 1959)   metoclopramide HCl injection 10 mg (10 mg Intramuscular Given 11/12/23 1958)     Medical Decision Making  Differential:  Headache      Risk  Prescription drug management.               ED Course as of 11/12/23 2005   Sun Nov 12, 2023 2004 8:04 PM Reassessed patient at this time.  Reports condition has improved. Discussed with patient all pertinent ED information and results. Discussed diagnosis and treatment plan with patient. Follow up instructions and return to ED instruction have been given. All questions and concerns were addressed at this time. Patient voices understanding of information and instructions. Patient is comfortable with plan and discharge. Patient is stable for discharge.    [JA]      ED Course User Index  [JA] Cosme King Jr., FNP                    Clinical Impression:   Final diagnoses:  [R51.9] Acute nonintractable headache, unspecified headache type (Primary)        ED Disposition Condition    Discharge Stable          ED Prescriptions       Medication Sig Dispense Start Date End Date Auth. Provider    ibuprofen (ADVIL,MOTRIN) 800 MG tablet Take 1 tablet (800 mg total) by mouth every 8 (eight) hours as needed for Pain (take with food for pain). 20 tablet 11/12/2023 -- Cosme King Jr., FNP          Follow-up Information       Follow up With Specialties Details Why Contact Info    [facility] Emergency Dept Emergency Medicine In 3 days As needed, If symptoms worsen [address] [facility] [address] [phone]             Cosme King Jr., FNP  11/12/23 2005

## 2023-11-13 NOTE — DISCHARGE INSTRUCTIONS
Follow up with [facility] as discussed to obtain a PCP.  Take pain medication as prescribed for no more than 7 days.

## 2023-11-20 ENCOUNTER — HOSPITAL ENCOUNTER (EMERGENCY)
Facility: HOSPITAL | Age: 33
Discharge: HOME | End: 2023-11-21
Attending: STUDENT IN AN ORGANIZED HEALTH CARE EDUCATION/TRAINING PROGRAM
Payer: COMMERCIAL

## 2023-11-20 VITALS
HEIGHT: 66 IN | BODY MASS INDEX: 23.3 KG/M2 | OXYGEN SATURATION: 98 % | RESPIRATION RATE: 20 BRPM | TEMPERATURE: 98 F | DIASTOLIC BLOOD PRESSURE: 77 MMHG | WEIGHT: 145 LBS | HEART RATE: 80 BPM | SYSTOLIC BLOOD PRESSURE: 124 MMHG

## 2023-11-20 DIAGNOSIS — M26.51 MALOCCLUSION OF JAWS: Primary | ICD-10-CM

## 2023-11-20 PROCEDURE — 99283 EMERGENCY DEPT VISIT LOW MDM: CPT

## 2023-11-21 NOTE — DISCHARGE INSTRUCTIONS
Please follow up with a dentist and you will likely need a referral to an oral surgeon.  Return to the ER with any new or worsening symptoms.

## 2023-12-08 ENCOUNTER — HOSPITAL ENCOUNTER (EMERGENCY)
Facility: HOSPITAL | Age: 33
Discharge: HOME | End: 2023-12-08
Attending: EMERGENCY MEDICINE
Payer: COMMERCIAL

## 2023-12-08 VITALS
DIASTOLIC BLOOD PRESSURE: 89 MMHG | BODY MASS INDEX: 22.76 KG/M2 | WEIGHT: 145 LBS | SYSTOLIC BLOOD PRESSURE: 128 MMHG | RESPIRATION RATE: 18 BRPM | HEART RATE: 78 BPM | OXYGEN SATURATION: 99 % | HEIGHT: 67 IN | TEMPERATURE: 98 F

## 2023-12-08 DIAGNOSIS — S01.511A LIP LACERATION, INITIAL ENCOUNTER: Primary | ICD-10-CM

## 2023-12-08 PROCEDURE — 63600175 PHARM REV CODE 636 W HCPCS: Performed by: EMERGENCY MEDICINE

## 2023-12-08 PROCEDURE — 99284 EMERGENCY DEPT VISIT MOD MDM: CPT

## 2023-12-08 PROCEDURE — 96372 THER/PROPH/DIAG INJ SC/IM: CPT | Performed by: EMERGENCY MEDICINE

## 2023-12-08 RX ORDER — KETOROLAC TROMETHAMINE 30 MG/ML
15 INJECTION, SOLUTION INTRAMUSCULAR; INTRAVENOUS
Status: COMPLETED | OUTPATIENT
Start: 2023-12-08 | End: 2023-12-08

## 2023-12-08 RX ADMIN — KETOROLAC TROMETHAMINE 15 MG: 30 INJECTION, SOLUTION INTRAMUSCULAR; INTRAVENOUS at 06:12

## 2023-12-08 NOTE — ED NOTES
Pt given discharge instructions. Pt instructed to follow up with pcp and return to er If any complications. Pt given lip balm

## 2023-12-08 NOTE — Clinical Note
"Chun Patel" Jessicadavidsourav was seen and treated in our emergency department on 12/8/2023.  He may return to work on 12/08/2023.       If you have any questions or concerns, please don't hesitate to call.       RN    "

## 2023-12-08 NOTE — ED PROVIDER NOTES
"ED PROVIDER NOTE  12/8/2023    CHIEF COMPLAINT:   Chief Complaint   Patient presents with    Lip Laceration     Lkower lip laceration x3 days after "gold grill" dislodged.        HISTORY OF PRESENT ILLNESS:   Chun Fsicher is a 33 y.o. male who presents with chief complaint Lip laceration.  Onset was 3 days ago whenever he cut his lower lip on his "gold grill."    The history is provided by the patient. The history is limited by a language barrier. A  was used.         REVIEW OF SYSTEMS: as noted in the HPI.  NURSING NOTES REVIEWED      PAST MEDICAL/SURGICAL HISTORY:   Past Medical History:   Diagnosis Date    Deaf     Hypertension     Migraines     Seizures       Past Surgical History:   Procedure Laterality Date    ABDOMINAL SURGERY      IMPLANTATION OF COCHLEAR PROSTHESIS      INNER EAR SURGERY Right        FAMILY HISTORY:   Family History   Problem Relation Age of Onset    Hypertension Mother        SOCIAL HISTORY:   Social History     Tobacco Use    Smoking status: Heavy Smoker     Types: Cigars    Smokeless tobacco: Never   Substance Use Topics    Alcohol use: Not Currently    Drug use: Never       ALLERGIES: Review of patient's allergies indicates:  No Known Allergies    PHYSICAL EXAM:  Initial Vitals [12/08/23 0603]   BP Pulse Resp Temp SpO2   (!) 135/91 72 20 96.4 °F (35.8 °C) 98 %      MAP       --         Physical Exam    Nursing note and vitals reviewed.  Constitutional: He appears well-developed and well-nourished. No distress.   HENT:   Head: Normocephalic and atraumatic.   Nose: Nose normal.   Mouth/Throat: Oropharynx is clear and moist and mucous membranes are normal.       Very Superficial laceration to lower lip   Eyes: Conjunctivae and EOM are normal. Pupils are equal, round, and reactive to light.   Neck: Neck supple. No tracheal deviation present.   Cardiovascular:  Normal rate, regular rhythm, normal heart sounds, intact distal pulses and normal pulses.         "   Pulmonary/Chest: Effort normal and breath sounds normal. No respiratory distress.   Abdominal: Abdomen is soft. There is no abdominal tenderness. There is no rebound and no guarding.   Musculoskeletal:         General: Normal range of motion.      Cervical back: Neck supple.     Neurological: He is alert and oriented to person, place, and time. GCS eye subscore is 4. GCS verbal subscore is 5. GCS motor subscore is 6.   CN II-XII intact. Moves all extremities. No gross sensory or motor deficits.   Skin: Skin is warm, dry and intact.   Psychiatric: He has a normal mood and affect. His speech is normal and behavior is normal. Judgment and thought content normal. Cognition and memory are normal.         RESULTS:  Labs Reviewed - No data to display  Imaging Results    None         PROCEDURES:  Procedures    ECG:       ED COURSE AND MEDICAL DECISION MAKING:  Medications   ketorolac injection 15 mg (15 mg Intramuscular Given 12/8/23 0636)           Medical Decision Making  33-year-old male who presents with laceration to his lower lip sustained 3 days ago.  Laceration appears very superficial, just needs moisturizer.  No evidence for infection.  Given strict ED return precautions. I have spoken with the patient and/or caregivers. I have explained the patient's condition, diagnoses and treatment plan based on the information available to me at this time. I have answered the patient's and/or caregiver's questions and addressed any concerns. The patient and/or caregivers have as good an understanding of the patient's diagnosis, condition and treatment plan as can be expected at this point. The vital signs have been stable. The patient's condition is stable and appropriate for discharge from the emergency department.     The patient will pursue further outpatient evaluation with the primary care physician or other designated or consulting physician as outlined in the discharge instructions. The patient and/or caregivers are  agreeable to this plan of care and follow-up instructions have been explained in detail. The patient and/or caregivers have received these instructions in written format and have expressed an understanding of the discharge instructions. The patient and/or caregivers are aware that any significant change in condition or worsening of symptoms should prompt an immediate return to this or the closest emergency department or a call to 911.    Risk  OTC drugs.  Prescription drug management.        CLINICAL IMPRESSION:  1. Lip laceration, initial encounter        DISPOSITION:   ED Disposition Condition    Discharge Stable            ED Prescriptions    None       Follow-up Information       Follow up With Specialties Details Why Contact Info    [facility] Emergency Dept Emergency Medicine  If symptoms worsen [address] [facility] [address]  [phone]               Landon Glaser, DO  12/08/23 0657
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
## 2023-12-19 ENCOUNTER — OFFICE VISIT (OUTPATIENT)
Dept: INTERNAL MEDICINE | Facility: CLINIC | Age: 33
End: 2023-12-19
Payer: COMMERCIAL

## 2023-12-19 VITALS
SYSTOLIC BLOOD PRESSURE: 130 MMHG | BODY MASS INDEX: 24.96 KG/M2 | HEART RATE: 67 BPM | RESPIRATION RATE: 20 BRPM | OXYGEN SATURATION: 99 % | TEMPERATURE: 98 F | HEIGHT: 67 IN | DIASTOLIC BLOOD PRESSURE: 86 MMHG | WEIGHT: 159 LBS

## 2023-12-19 DIAGNOSIS — R51.9 ACUTE NONINTRACTABLE HEADACHE, UNSPECIFIED HEADACHE TYPE: ICD-10-CM

## 2023-12-19 DIAGNOSIS — Z86.59 HX OF SCHIZOPHRENIA: Primary | ICD-10-CM

## 2023-12-19 DIAGNOSIS — G40.909 NONINTRACTABLE EPILEPSY WITHOUT STATUS EPILEPTICUS, UNSPECIFIED EPILEPSY TYPE: ICD-10-CM

## 2023-12-19 PROCEDURE — 99215 OFFICE O/P EST HI 40 MIN: CPT | Mod: PBBFAC

## 2023-12-20 NOTE — PROGRESS NOTES
Attending Addendum:   Patient seen and examined in clinic. Management and Plan were discussed with resident. Care was reasonable and necessary.   Priscilla Garcia MD  Internal Medicine   [facility]

## 2023-12-21 ENCOUNTER — HOSPITAL ENCOUNTER (EMERGENCY)
Facility: HOSPITAL | Age: 33
Discharge: HOME | End: 2023-12-21
Attending: EMERGENCY MEDICINE
Payer: COMMERCIAL

## 2023-12-21 VITALS
OXYGEN SATURATION: 100 % | RESPIRATION RATE: 18 BRPM | DIASTOLIC BLOOD PRESSURE: 66 MMHG | SYSTOLIC BLOOD PRESSURE: 148 MMHG | WEIGHT: 165 LBS | HEART RATE: 60 BPM | TEMPERATURE: 97 F | HEIGHT: 68 IN | BODY MASS INDEX: 25.01 KG/M2

## 2023-12-21 DIAGNOSIS — S01.511A LIP LACERATION, INITIAL ENCOUNTER: ICD-10-CM

## 2023-12-21 DIAGNOSIS — R68.84 JAW PAIN: Primary | ICD-10-CM

## 2023-12-21 PROCEDURE — 99284 EMERGENCY DEPT VISIT MOD MDM: CPT | Mod: 25

## 2023-12-21 PROCEDURE — 25000003 PHARM REV CODE 250: Performed by: PHYSICIAN ASSISTANT

## 2023-12-21 PROCEDURE — 12011 RPR F/E/E/N/L/M 2.5 CM/<: CPT

## 2023-12-21 RX ORDER — HYDROCODONE BITARTRATE AND ACETAMINOPHEN 5; 325 MG/1; MG/1
1 TABLET ORAL ONCE
Status: COMPLETED | OUTPATIENT
Start: 2023-12-21 | End: 2023-12-21

## 2023-12-21 RX ORDER — LIDOCAINE HYDROCHLORIDE 10 MG/ML
5 INJECTION, SOLUTION EPIDURAL; INFILTRATION; INTRACAUDAL; PERINEURAL
Status: COMPLETED | OUTPATIENT
Start: 2023-12-21 | End: 2023-12-21

## 2023-12-21 RX ADMIN — LIDOCAINE HYDROCHLORIDE 50 MG: 10 INJECTION, SOLUTION EPIDURAL; INFILTRATION; INTRACAUDAL; PERINEURAL at 08:12

## 2023-12-21 RX ADMIN — HYDROCODONE BITARTRATE AND ACETAMINOPHEN 1 TABLET: 5; 325 TABLET ORAL at 07:12
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
## 2023-12-27 ENCOUNTER — HOSPITAL ENCOUNTER (EMERGENCY)
Facility: HOSPITAL | Age: 33
Discharge: HOME | End: 2023-12-27
Attending: EMERGENCY MEDICINE
Payer: COMMERCIAL

## 2023-12-27 ENCOUNTER — TELEPHONE (OUTPATIENT)
Dept: NEUROLOGY | Facility: CLINIC | Age: 33
End: 2023-12-27
Payer: COMMERCIAL

## 2023-12-27 VITALS
RESPIRATION RATE: 18 BRPM | WEIGHT: 148.13 LBS | BODY MASS INDEX: 21.94 KG/M2 | SYSTOLIC BLOOD PRESSURE: 137 MMHG | HEART RATE: 63 BPM | TEMPERATURE: 97 F | HEIGHT: 69 IN | OXYGEN SATURATION: 100 % | DIASTOLIC BLOOD PRESSURE: 86 MMHG

## 2023-12-27 DIAGNOSIS — Z51.89 VISIT FOR WOUND CHECK: Primary | ICD-10-CM

## 2023-12-27 PROCEDURE — 99281 EMR DPT VST MAYX REQ PHY/QHP: CPT

## 2023-12-27 NOTE — DISCHARGE INSTRUCTIONS
You had stitches placed on the inside of your lip that dissolve on their own. You do not need the stitches to be removed.     It is important that you follow up with your primary care provider or specialist if indicated for further evaluation, workup, and treatment as necessary. The exam and treatment you received in Emergency Department was for an urgent problem and NOT INTENDED AS COMPLETE CARE. It is important that you FOLLOW UP with a doctor for ongoing care. If your symptoms become WORSE or you DO NOT IMPROVE and you are unable to reach your health care provider, you should RETURN to the Emergency Department.

## 2023-12-27 NOTE — TELEPHONE ENCOUNTER
We have received the referral on this patient. Can you kindly order a routine EEG so we may have it on file when he is seen by Dr. Wynne? Thank you!

## 2023-12-27 NOTE — ED PROVIDER NOTES
Encounter Date: 12/27/2023       History     Chief Complaint   Patient presents with    Suture / Staple Removal     Pt reports suture to upper lip became dislodged. Placed on 12/21. No distress. Hearing impaired.     Chun Fischer is a 33 y.o. male with HTN, migraines, and seizures who presents to the ED for wound check. Patient states he had one suture placed in his left upper lip on 12/21 and he believes it fell out. He denies any lip pain, swelling, drainage.     The history is provided by the patient.     Review of patient's allergies indicates:  No Known Allergies  Past Medical History:   Diagnosis Date    Deaf     Hypertension     Migraines     Seizures      Past Surgical History:   Procedure Laterality Date    ABDOMINAL SURGERY      IMPLANTATION OF COCHLEAR PROSTHESIS      INNER EAR SURGERY Right      Family History   Problem Relation Age of Onset    Hypertension Mother      Social History     Tobacco Use    Smoking status: Heavy Smoker     Types: Cigars    Smokeless tobacco: Never   Substance Use Topics    Alcohol use: Not Currently    Drug use: Never     Review of Systems   Constitutional:  Negative for activity change, chills and fever.   HENT:  Negative for congestion and trouble swallowing.    Eyes:  Negative for photophobia and visual disturbance.   Respiratory:  Negative for chest tightness, shortness of breath and wheezing.    Cardiovascular:  Negative for chest pain, palpitations and leg swelling.   Gastrointestinal:  Negative for abdominal pain, constipation, diarrhea, nausea and vomiting.   Genitourinary:  Negative for dysuria, frequency, hematuria and urgency.   Musculoskeletal:  Negative for arthralgias, back pain and gait problem.   Skin:  Positive for wound. Negative for color change and rash.   Neurological:  Negative for dizziness, syncope, weakness, light-headedness, numbness and headaches.   Psychiatric/Behavioral:  Negative for agitation and confusion. The patient is not  nervous/anxious.        Physical Exam     Initial Vitals [12/27/23 1032]   BP Pulse Resp Temp SpO2   137/86 63 18 96.8 °F (36 °C) 100 %      MAP       --         Physical Exam    Nursing note and vitals reviewed.  Constitutional: He appears well-developed and well-nourished. No distress.   HENT:   Head: Normocephalic and atraumatic.   Mouth/Throat: No oropharyngeal exudate.   Eyes: EOM are normal. No scleral icterus.   Neck: Neck supple.   Normal range of motion.  Cardiovascular:  Normal rate and regular rhythm.           No murmur heard.  Pulmonary/Chest: No respiratory distress. He has no wheezes.   Abdominal: Abdomen is soft. He exhibits no distension. There is no abdominal tenderness.   Musculoskeletal:         General: No edema. Normal range of motion.      Cervical back: Normal range of motion and neck supple.     Neurological: He is alert and oriented to person, place, and time. No cranial nerve deficit.   Skin: Skin is warm and dry. Capillary refill takes less than 2 seconds. No erythema.   Left upper lip with well healed laceration. No suture present.    Psychiatric: He has a normal mood and affect. Thought content normal.         ED Course   Procedures  Labs Reviewed - No data to display       Imaging Results    None          Medications - No data to display  Medical Decision Making  Pt did not know suture was absorbable and was concerned that it fell out too early. Laceration is well healed and he is stable for discharge home. ED return precautions discussed. He verbalized understanding. All questions answered.                                       Clinical Impression:  Final diagnoses:  [Z51.89] Visit for wound check (Primary)          ED Disposition Condition    Discharge Stable          ED Prescriptions    None       Follow-up Information       Follow up With Specialties Details Why Contact Info    [facility] Emergency Medicine  If symptoms worsen [address] [facility]  [place] [address] [phone]    PCP  In 3 days Hospital follow up              Cindy Love PA-C  12/27/23 1041

## 2024-01-01 ENCOUNTER — HOSPITAL ENCOUNTER (EMERGENCY)
Facility: HOSPITAL | Age: 34
Discharge: HOME | End: 2024-01-01
Attending: INTERNAL MEDICINE
Payer: COMMERCIAL

## 2024-01-01 VITALS
HEART RATE: 61 BPM | SYSTOLIC BLOOD PRESSURE: 132 MMHG | OXYGEN SATURATION: 100 % | RESPIRATION RATE: 20 BRPM | HEIGHT: 70 IN | WEIGHT: 170 LBS | BODY MASS INDEX: 24.34 KG/M2 | DIASTOLIC BLOOD PRESSURE: 94 MMHG

## 2024-01-01 DIAGNOSIS — R53.1 WEAKNESS: Primary | ICD-10-CM

## 2024-01-01 LAB
ALBUMIN SERPL-MCNC: 4 G/DL (ref 3.5–5)
ALBUMIN/GLOB SERPL: 1.5 RATIO (ref 1.1–2)
ALP SERPL-CCNC: 49 UNIT/L (ref 40–150)
ALT SERPL-CCNC: 11 UNIT/L (ref 0–55)
AST SERPL-CCNC: 19 UNIT/L (ref 5–34)
BASOPHILS # BLD AUTO: 0.01 X10(3)/MCL
BASOPHILS NFR BLD AUTO: 0.2 %
BILIRUB SERPL-MCNC: 0.4 MG/DL
BUN SERPL-MCNC: 10.6 MG/DL (ref 8.9–20.6)
CALCIUM SERPL-MCNC: 8.9 MG/DL (ref 8.4–10.2)
CHLORIDE SERPL-SCNC: 106 MMOL/L (ref 98–107)
CO2 SERPL-SCNC: 26 MMOL/L (ref 22–29)
CREAT SERPL-MCNC: 0.82 MG/DL (ref 0.73–1.18)
EOSINOPHIL # BLD AUTO: 0.09 X10(3)/MCL (ref 0–0.9)
EOSINOPHIL NFR BLD AUTO: 1.6 %
ERYTHROCYTE [DISTWIDTH] IN BLOOD BY AUTOMATED COUNT: 13.6 % (ref 11.5–17)
GFR SERPLBLD CREATININE-BSD FMLA CKD-EPI: >60 MLS/MIN/1.73/M2
GLOBULIN SER-MCNC: 2.6 GM/DL (ref 2.4–3.5)
GLUCOSE SERPL-MCNC: 72 MG/DL (ref 74–100)
HCT VFR BLD AUTO: 37.9 % (ref 42–52)
HGB BLD-MCNC: 12.7 G/DL (ref 14–18)
HOLD SPECIMEN: NORMAL
HOLD SPECIMEN: NORMAL
IMM GRANULOCYTES # BLD AUTO: 0.01 X10(3)/MCL (ref 0–0.04)
IMM GRANULOCYTES NFR BLD AUTO: 0.2 %
LYMPHOCYTES # BLD AUTO: 2.97 X10(3)/MCL (ref 0.6–4.6)
LYMPHOCYTES NFR BLD AUTO: 53.2 %
MCH RBC QN AUTO: 30.8 PG (ref 27–31)
MCHC RBC AUTO-ENTMCNC: 33.5 G/DL (ref 33–36)
MCV RBC AUTO: 92 FL (ref 80–94)
MONOCYTES # BLD AUTO: 0.3 X10(3)/MCL (ref 0.1–1.3)
MONOCYTES NFR BLD AUTO: 5.4 %
NEUTROPHILS # BLD AUTO: 2.2 X10(3)/MCL (ref 2.1–9.2)
NEUTROPHILS NFR BLD AUTO: 39.4 %
NRBC BLD AUTO-RTO: 0 %
PLATELET # BLD AUTO: 162 X10(3)/MCL (ref 130–400)
PMV BLD AUTO: 11.6 FL (ref 7.4–10.4)
POCT GLUCOSE: 77 MG/DL (ref 70–110)
POTASSIUM SERPL-SCNC: 4.1 MMOL/L (ref 3.5–5.1)
PROT SERPL-MCNC: 6.6 GM/DL (ref 6.4–8.3)
RBC # BLD AUTO: 4.12 X10(6)/MCL (ref 4.7–6.1)
SODIUM SERPL-SCNC: 141 MMOL/L (ref 136–145)
WBC # SPEC AUTO: 5.58 X10(3)/MCL (ref 4.5–11.5)

## 2024-01-01 PROCEDURE — 80053 COMPREHEN METABOLIC PANEL: CPT | Performed by: INTERNAL MEDICINE

## 2024-01-01 PROCEDURE — 82962 GLUCOSE BLOOD TEST: CPT

## 2024-01-01 PROCEDURE — 25000003 PHARM REV CODE 250: Performed by: INTERNAL MEDICINE

## 2024-01-01 PROCEDURE — 99283 EMERGENCY DEPT VISIT LOW MDM: CPT

## 2024-01-01 PROCEDURE — 85025 COMPLETE CBC W/AUTO DIFF WBC: CPT | Performed by: INTERNAL MEDICINE

## 2024-01-01 RX ORDER — ACETAMINOPHEN 325 MG/1
650 TABLET ORAL
Status: COMPLETED | OUTPATIENT
Start: 2024-01-01 | End: 2024-01-01

## 2024-01-01 RX ADMIN — ACETAMINOPHEN 650 MG: 325 TABLET, FILM COATED ORAL at 12:01

## 2024-01-01 NOTE — Clinical Note
"Chun Patel" Jessicadavidsourav was seen and treated in our emergency department on 1/1/2024.  He may return to work on 01/02/2024.       If you have any questions or concerns, please don't hesitate to call.      Adri WISE    "

## 2024-01-01 NOTE — ED PROVIDER NOTES
Encounter Date: 1/1/2024       History     Chief Complaint   Patient presents with    Fatigue     Weakness  for   3  days     Presents with weakness and a headache for the last 3 days.    The history is provided by the patient.     Review of patient's allergies indicates:  No Known Allergies  Past Medical History:   Diagnosis Date    Deaf     Hypertension     Migraines     Seizures      Past Surgical History:   Procedure Laterality Date    ABDOMINAL SURGERY      IMPLANTATION OF COCHLEAR PROSTHESIS      INNER EAR SURGERY Right      Family History   Problem Relation Age of Onset    Hypertension Mother      Social History     Tobacco Use    Smoking status: Heavy Smoker     Types: Cigars    Smokeless tobacco: Never   Substance Use Topics    Alcohol use: Not Currently    Drug use: Never     Review of Systems   Constitutional:  Positive for fatigue. Negative for fever.   HENT:  Negative for sore throat.    Respiratory:  Negative for shortness of breath.    Cardiovascular:  Negative for chest pain.   Gastrointestinal:  Negative for nausea.   Genitourinary:  Negative for dysuria.   Musculoskeletal:  Negative for back pain.   Skin:  Negative for rash.   Neurological:  Positive for weakness and headaches.   Hematological:  Does not bruise/bleed easily.   All other systems reviewed and are negative.      Physical Exam     Initial Vitals [01/01/24 0018]   BP Pulse Resp Temp SpO2   (!) 132/98 (!) 58 20 -- 100 %      MAP       --         Physical Exam    Nursing note and vitals reviewed.  Constitutional: He appears well-developed.   HENT:   Head: Normocephalic and atraumatic.   Cochlear implant noticed right area   Eyes: Conjunctivae and EOM are normal. Pupils are equal, round, and reactive to light.   Neck: Neck supple. No thyromegaly present. No JVD present.   Normal range of motion.  Cardiovascular:  Regular rhythm, normal heart sounds and intact distal pulses.           Pulmonary/Chest: Breath sounds normal. No respiratory  distress.   Abdominal: Abdomen is soft. Bowel sounds are normal. He exhibits no distension. There is no abdominal tenderness. There is no rebound and no guarding.   Musculoskeletal:         General: No edema. Normal range of motion.      Cervical back: Normal range of motion and neck supple.     Neurological: He is alert and oriented to person, place, and time. He has normal strength. GCS score is 15. GCS eye subscore is 4. GCS verbal subscore is 5. GCS motor subscore is 6.   Skin: Skin is warm and dry. No rash noted.   Psychiatric: His behavior is normal.         ED Course   Procedures  Labs Reviewed   COMPREHENSIVE METABOLIC PANEL - Abnormal; Notable for the following components:       Result Value    Glucose Level 72 (*)     All other components within normal limits   CBC WITH DIFFERENTIAL - Abnormal; Notable for the following components:    RBC 4.12 (*)     Hgb 12.7 (*)     Hct 37.9 (*)     MPV 11.6 (*)     All other components within normal limits   CBC W/ AUTO DIFFERENTIAL    Narrative:     The following orders were created for panel order CBC auto differential.  Procedure                               Abnormality         Status                     ---------                               -----------         ------                     CBC with Differential[[id number]]       Abnormal            Final result                 Please view results for these tests on the individual orders.   EXTRA TUBES    Narrative:     The following orders were created for panel order EXTRA TUBES.  Procedure                               Abnormality         Status                     ---------                               -----------         ------                     Light Blue Top Hold[[id number]]                             In process                 Gold Top Hold[[id number]]                                   In process                   Please view results for these tests on the individual orders.   LIGHT BLUE TOP HOLD   GOLD  TOP HOLD   POCT GLUCOSE, HAND-HELD DEVICE          Imaging Results    None          Medications   acetaminophen tablet 650 mg (650 mg Oral Given 1/1/24 0030)     Medical Decision Making  Amount and/or Complexity of Data Reviewed  External Data Reviewed: radiology.     Details: IMPRESSION  1. Similar appearance of right cochlear implant components with resulting severe limiting streak artifact.  2. Within limitations, no convincing acute abnormality or significant change from the comparison.     RADIATION DOSE  Automated tube current modulation, weight-based exposure dosing, and/or iterative reconstruction technique utilized to reach lowest reasonably achievable exposure rate.     DLP: 974 mGy*cm        Electronically signed by: Juanjose May  Date:                                            12/21/2023  Time:                                           20:44    Labs: ordered. Decision-making details documented in ED Course.    Risk  OTC drugs.      Additional MDM:   Differential Diagnosis:   Hypothyroidism, medication side effect, orthostatic weakness, kidney failure, stroke, among others'                                    Clinical Impression:  Final diagnoses:  [R53.1] Weakness (Primary)          ED Disposition Condition    Discharge Stable          ED Prescriptions    None       Follow-up Information       Follow up With Specialties Details Why Contact Info    Salvador eLe MD Internal Medicine   [address] [address]  [phone]      [facility] Emergency Medicine Schedule an appointment as soon as possible for a visit   [address] [address] [phone]             Gonzalo Coates MD  01/01/24 0119

## 2024-01-02 ENCOUNTER — HOSPITAL ENCOUNTER (EMERGENCY)
Facility: HOSPITAL | Age: 34
Discharge: HOME | End: 2024-01-03
Attending: FAMILY MEDICINE
Payer: COMMERCIAL

## 2024-01-02 DIAGNOSIS — J06.9 UPPER RESPIRATORY TRACT INFECTION, UNSPECIFIED TYPE: Primary | ICD-10-CM

## 2024-01-02 PROCEDURE — 99282 EMERGENCY DEPT VISIT SF MDM: CPT

## 2024-01-02 NOTE — Clinical Note
"Chun Patel" Amado was seen and treated in our emergency department on 1/2/2024.  He may return to work on 01/05/2024.       If you have any questions or concerns, please don't hesitate to call.      DOROTHEA WISE    "

## 2024-01-03 VITALS
DIASTOLIC BLOOD PRESSURE: 83 MMHG | HEART RATE: 61 BPM | BODY MASS INDEX: 32.58 KG/M2 | RESPIRATION RATE: 17 BRPM | SYSTOLIC BLOOD PRESSURE: 126 MMHG | WEIGHT: 220 LBS | OXYGEN SATURATION: 99 % | TEMPERATURE: 98 F | HEIGHT: 69 IN

## 2024-01-03 PROCEDURE — 87651 STREP A DNA AMP PROBE: CPT | Performed by: PHYSICIAN ASSISTANT

## 2024-01-03 PROCEDURE — 0240U COVID/FLU A&B PCR: CPT | Performed by: PHYSICIAN ASSISTANT

## 2024-01-03 NOTE — ED PROVIDER NOTES
Encounter Date: 1/2/2024     History     Chief Complaint   Patient presents with    Generalized Body Aches     Pt states flu-like symptoms x2 days. Pt denies SOB. Pt reports headache and generalized body aches.      Hearing impaired patient with pmhx of migraines, seizures, and htn presents today c/o sinus congestion, rhinorrhea, cough, headache, and bodyaches for 2-3 days. No exacerbating or relieving factors. He denies any other associated symptoms.     The history is provided by the patient. No  was used.     Review of patient's allergies indicates:  No Known Allergies  Past Medical History:   Diagnosis Date    Deaf     Hypertension     Migraines     Seizures      Past Surgical History:   Procedure Laterality Date    ABDOMINAL SURGERY      IMPLANTATION OF COCHLEAR PROSTHESIS      INNER EAR SURGERY Right      Family History   Problem Relation Age of Onset    Hypertension Mother      Social History     Tobacco Use    Smoking status: Heavy Smoker     Types: Cigars    Smokeless tobacco: Never   Substance Use Topics    Alcohol use: Not Currently    Drug use: Never     Review of Systems   Constitutional:  Negative for chills and fever.   HENT:  Positive for congestion, hearing loss and rhinorrhea. Negative for ear discharge, ear pain, postnasal drip, sinus pressure, sinus pain and sore throat.    Respiratory:  Positive for cough. Negative for apnea, choking, chest tightness, shortness of breath, wheezing and stridor.    Cardiovascular:  Negative for chest pain.   Gastrointestinal:  Negative for abdominal pain, diarrhea, nausea and vomiting.   Genitourinary:  Negative for flank pain.   Musculoskeletal:  Positive for arthralgias and myalgias.   Skin:  Negative for rash.   Neurological:  Positive for headaches.   All other systems reviewed and are negative.      Physical Exam     Initial Vitals [01/02/24 2358]   BP Pulse Resp Temp SpO2   131/88 66 18 97.6 °F (36.4 °C) 99 %      MAP       --          Physical Exam    Nursing note and vitals reviewed.  Constitutional: He appears well-developed and well-nourished. He is not diaphoretic. No distress.   HENT:   Head: Normocephalic and atraumatic.   Right Ear: External ear normal.   Left Ear: External ear normal.   Nose: Nose normal.   Mouth/Throat: Oropharynx is clear and moist. No oropharyngeal exudate.   Cochlear implant noted   Eyes: Conjunctivae and EOM are normal.   Neck: Neck supple.   Normal range of motion.  Cardiovascular:  Normal rate, regular rhythm, normal heart sounds and intact distal pulses.           Pulmonary/Chest: Breath sounds normal. No respiratory distress. He has no wheezes. He has no rhonchi. He has no rales.   Abdominal: Abdomen is soft. Bowel sounds are normal. He exhibits no distension. There is no abdominal tenderness. There is no rebound and no guarding.   Musculoskeletal:         General: No edema.      Cervical back: Normal range of motion and neck supple.     Neurological: He is alert and oriented to person, place, and time. GCS score is 15. GCS eye subscore is 4. GCS verbal subscore is 5. GCS motor subscore is 6.   Skin: Skin is warm and dry. Capillary refill takes less than 2 seconds. No rash noted.   Psychiatric: He has a normal mood and affect.       ED Course   Procedures  Labs Reviewed   COVID/FLU A&B PCR - Normal    Narrative:     The Xpert Xpress SARS-CoV-2/FLU/RSV plus is a rapid, multiplexed real-time PCR test intended for the simultaneous qualitative detection and differentiation of SARS-CoV-2, Influenza A, Influenza B, and respiratory syncytial virus (RSV) viral RNA in either nasopharyngeal swab or nasal swab specimens.         STREP GROUP A BY PCR - Normal    Narrative:     The Xpert Xpress Strep A test is a rapid, qualitative in vitro diagnostic test for the detection of Streptococcus pyogenes (Group A ß-hemolytic Streptococcus, Strep A) in throat swab specimens from patients with signs and symptoms of pharyngitis.             Imaging Results    None          Medications - No data to display  Medical Decision Making  Patient presents today with URI symptoms for 2-3 days    Ddx: common cold, influenza, covid-19, strep throat, amongst others     Patient is non-toxic appearing. Vitals stable. Labs reviewed. Recommend supportive measures at home. Advised to f/u with pcp. He is stable for discharge. ED precautions given.     Amount and/or Complexity of Data Reviewed  Labs: ordered. Decision-making details documented in ED Course.               ED Course as of 01/03/24 0146 Wed Jan 03, 2024   0142 Influenza A, Molecular: Not Detected [SA]   0143 Influenza B, Molecular: Not Detected [SA]   0143 SARS-CoV2 (COVID-19) Qualitative PCR: Not Detected [SA]   0143 STREP A PCR ([facility]): Not Detected [SA]      ED Course User Index  [SA] Whitney Martines PA                   Clinical Impression:  Final diagnoses:  [J06.9] Upper respiratory tract infection, unspecified type (Primary)      ED Disposition Condition    Discharge Stable          ED Prescriptions    None       Follow-up Information       Follow up With Specialties Details Why Contact Info    [facility] Emergency Medicine  If symptoms worsen return to ED immediately [address] [facility] [id number] [phone]    Salvador Lee MD Internal Medicine In 2 days  [address] [facility] [address]  [phone]               Whitney Martines PA  01/03/24 0148

## 2024-01-10 DIAGNOSIS — R56.9 SEIZURE: Primary | ICD-10-CM

## 2024-01-17 ENCOUNTER — OFFICE VISIT (OUTPATIENT)
Dept: INTERNAL MEDICINE | Facility: CLINIC | Age: 34
End: 2024-01-17
Payer: COMMERCIAL

## 2024-01-17 VITALS
SYSTOLIC BLOOD PRESSURE: 126 MMHG | BODY MASS INDEX: 23.28 KG/M2 | TEMPERATURE: 98 F | HEART RATE: 73 BPM | WEIGHT: 157.19 LBS | DIASTOLIC BLOOD PRESSURE: 84 MMHG | OXYGEN SATURATION: 100 % | RESPIRATION RATE: 19 BRPM | HEIGHT: 69 IN

## 2024-01-17 DIAGNOSIS — Z72.0 TOBACCO USE: ICD-10-CM

## 2024-01-17 DIAGNOSIS — Z23 NEED FOR INFLUENZA VACCINATION: Primary | ICD-10-CM

## 2024-01-17 PROCEDURE — 90686 IIV4 VACC NO PRSV 0.5 ML IM: CPT | Mod: PBBFAC

## 2024-01-17 PROCEDURE — 99213 OFFICE O/P EST LOW 20 MIN: CPT | Mod: PBBFAC

## 2024-01-17 RX ORDER — IBUPROFEN 200 MG
1 TABLET ORAL DAILY
Qty: 28 PATCH | Refills: 4 | Status: SHIPPED | OUTPATIENT
Start: 2024-01-17 | End: 2024-03-11

## 2024-01-17 NOTE — PROGRESS NOTES
[facility] INTERNAL MEDICINE  OUTPATIENT OFFICE VISIT NOTE    SUBJECTIVE:      Chief Complaint: Follow-up (Pt here today for f/u visit. No distress noted at this time. )       HPI: Chun Fischer is a 33 y.o. yo male w/ PMH of  has a past medical history of Deaf, epilepsy?  Schizoaffective disorder with bipolar disorder per chart review, chronic oral pain., who presents for  1 month(s) follow up.  Complaining of right wrist pain after.  She has a few days ago when he slipped on a icy surface.  Did not hit his head.  Pain is located mainly on his radial aspect of forearm.  Denies any numbness tingling in hands or up his forearm.     Since his last visit, patient establish with [facility] for dentistry.  He had two tooth extractions to tooth number 1 & 2.  States oral pain is much better since extractions.  Was scheduled to go for suture removal yesterday, but clinic was canceled secondary to bad weather.  We will request records.     Has appointment for EEG on 01/31/2024, behavioral health on 03/11/2024, neurology on 08/27/2024.  Strongly encouraged to attend these appointments.  Also discussed with patient's father regarding these appointments and provided written dates as a reminder.     No recent seizure-like activity reported.  Grandfather states patient does feel down and depressed at times and subsequently go to ED for various complaints.  Denies any SI or HI.     Smokes tobacco 1 pack daily since 21 years of age.  Strongly encouraged reducing tobacco use.  Patient agreeable to starting nicotine patches.    Amenable for flu shot vaccination today.  We will do pneumonia vaccine at next visit along with other vaccinations.     Follow up in 4 months.    Past Medical History:   has a past medical history of Deaf, Hypertension, Migraines, and Seizures.     Past Surgical History:   has a past surgical history that includes Inner ear surgery (Right); Implantation of cochlear prosthesis; and Abdominal surgery.     Family  "History:  family history includes Hypertension in his mother.     Social History:   reports that he has been smoking cigars. He has never used smokeless tobacco. He reports that he does not currently use alcohol. He reports that he does not use drugs.     Allergies:  has No Known Allergies.     Home Medications:  Current Outpatient Medications   Medication Instructions    busPIRone (BUSPAR) 10 mg, Oral, 2 times daily    cetirizine (ZYRTEC) 10 mg, Oral, Daily    clindamycin (CLEOCIN T) 1 % lotion Topical (Top), 2 times daily    clotrimazole (LOTRIMIN) 1 % cream Topical (Top), 2 times daily    dicyclomine (BENTYL) 20 mg, Oral, 2 times daily PRN    divalproex ER (DEPAKOTE ER) 1,000 mg, Oral, Nightly    famotidine (PEPCID) 20 mg, Oral, Nightly    hydrocortisone 2.5 % cream Topical (Top), 2 times daily    ibuprofen (ADVIL,MOTRIN) 800 mg, Oral, Every 8 hours PRN    INVEGA SUSTENNA 156 mg, Intramuscular, Every 30 days    nicotine (NICODERM CQ) 21 mg/24 hr 1 patch, Transdermal, Daily    ondansetron (ZOFRAN-ODT) 4 mg, Oral, Every 8 hours PRN    risperiDONE (RISPERDAL) 1 mg, Oral, 2 times daily    sertraline (ZOLOFT) 100 mg, Oral, 2 times daily        ROS:  Review of Systems   Constitutional:  Negative for chills and fever.   HENT:  Negative for ear pain.    Respiratory:  Negative for cough and shortness of breath.    Cardiovascular:  Negative for chest pain.   Gastrointestinal:  Negative for abdominal pain, heartburn, nausea and vomiting.   Musculoskeletal:         R wrist pain   Neurological:  Negative for seizures and headaches.   Psychiatric/Behavioral:  Negative for depression and suicidal ideas.           OBJECTIVE:     Vital signs:   /84 (BP Location: Right arm, Patient Position: Sitting, BP Method: Medium (Automatic))   Pulse 73   Temp 97.6 °F (36.4 °C) (Oral)   Resp 19   Ht 5' 9" (1.753 m)   Wt 71.3 kg (157 lb 3.2 oz)   SpO2 100%   BMI 23.21 kg/m²      Physical Examination:  General:  Unkempt, no acute " distress  Mouth:  Poor dentition throughout all teeth with multiple caries noted. Right sided tooth # 1&2 extracted w/ suture in place  Neck: Full ROM; no lymphadenopathy  Pulm: CTA bilaterally, normal work of breathing  CV: S1, S2 w/o murmurs or gallops; no edema noted  GI: Soft with normal bowel sounds in all quadrants, no masses on palpation  MSK: Full ROM of all extremities and spine w/o limitation or discomfort  Derm: No rashes, abnormal bruising, or skin lesions  Neuro: AAOx4; CN II-XII intact; motor/sensory function intact      Labs:  CMP:   Recent Labs   Lab 05/30/21  1545 07/02/21  1610 08/12/21  1107 05/26/22  1722 06/21/23 2123 06/30/23  2011 10/07/23  0753 01/01/24  0029   Sodium Level 141 140 140   < > 139 136 141 141   Potassium Level 3.3 L 3.6 3.7   < > 3.4 L 3.6 3.6 4.1   Chloride 106 106 103   < > 106 106 106 106   Carbon Dioxide 20 L 26 27   < > 23 24 26 26   Blood Urea Nitrogen 3.4 L 3.5 L 5.1 L   < > 9.1 7.4 L 8.1 L 10.6   Creatinine 0.73 0.75 0.78   < > 0.76 0.80 0.84 0.82   Glucose Level 93 92 88   < > 85 85 69 L 72 L   Calcium Level Total 9.3 9.5 9.7   < > 9.1 9.2 9.4 8.9   Albumin Level 4.3 4.0 4.2   < > 4.0 4.1  --  4.0   Bilirubin Total 0.6 0.7 0.5   < > 0.6 0.9  --  0.4   Bilirubin Direct 0.2 0.3 0.2  --   --   --   --   --    Bilirubin Indirect 0.40 0.40 0.30  --   --   --   --   --    Aspartate Aminotransferase 57 H 22 36 H   < > 17 18  --  19   Alanine Aminotransferase 72 H 20 41   < > 10 13  --  11   Alkaline Phosphatase 97 91 88   < > 55 59  --  49    < > = values in this interval not displayed.     CBC:   Recent Labs   Lab 06/30/23  2011 10/07/23  0753 01/01/24  0029   WBC 4.80 3.81 L 5.58   Neut # 2.53 1.25 L 2.20   RBC 4.45 L 4.34 L 4.12 L   Hgb 13.6 L 13.1 L 12.7 L   Hct 39.1 L 38.9 L 37.9 L   Platelet 192 154 162   MCV 87.9 89.6 92.0   RDW 12.9 13.3 13.6     FLP:   Recent Labs   Lab 03/09/21  1048   Triglyceride 115     DM:   Recent Labs   Lab 06/30/23  2011 10/07/23  0753  01/01/24  0029   Creatinine 0.80 0.84 0.82     Thyroid:   Recent Labs   Lab 05/30/21  1545 08/20/22  1704 10/07/23  0753   TSH 1.7177 0.9325 1.163     Anemia:   Recent Labs   Lab 01/01/24  0029   Hgb 12.7 L   Hct 37.9 L           ASSESSMENT & PLAN:        R wrist pain  - Likely sprain after fall  - conservative management w/ Rest Ice, Compression   - PRN tylenol and Ibuprofen    Schizoaffective disorder with bipolar disorder?  - defer starting antipsychotics given unsure of accuracy of current medication regimen  - referred to Psychiatry, scheduled 3/11/24 w/ Dr Rowe  - strongly reinforced attending appointment    Epilepsy?  - unsure of diagnosis   - patient reports he has been seizure-free for the last year  - EEG scheduled 1/31/24  - referred to Neurology scheduled 8/27/24    Mandibular fx with class 3 malocclusion, maxillary hypoplasia, and mandibular hyperplasia.   - s/p tooth extraction w/ [facility]   - medical release form signed to obtain records  - ibuprofen p.r.n.    Tobacco use disorder  - 1 pack/day since 20 y/o (8 pack year)  - encouraged reducing tobacco use  - will Rx nicotine patches    Health Maintenance  Vaccinations  Immunization History   Administered Date(s) Administered    DTP 1989, 1989, 1989, 11/29/1990    HIB 11/01/1990    Hepatitis A, Adult 03/28/2008, 09/30/2008    Influenza - Quadrivalent - PF *Preferred* (6 months and older) 01/17/2024    Influenza - Trivalent - PF (ADULT) 12/10/2020    MMR 08/10/1990    OPV 1989, 1989, 11/01/1990    Pneumococcal Conjugate - 13 Valent 07/13/2016    Td (ADULT) 01/17/2014    Td (Adult), Unspecified Formulation 02/28/2000    Td - PF (ADULT) 04/28/2015    Tdap 01/12/2014, 11/13/2014, 09/05/2016, 02/24/2017, 07/17/2021, 12/30/2021, 02/01/2022       -Flu:  Done 1/71/24     -Pneumonia: will address at next visit     -Shingles: will address at next visit   -Tdap: will address at next visit   -COVID: will address at next visit    Screening   -Osteoporosis: will address at next visit    -Lung Ca. Screening: will address at next visit   -Colon Ca. Screening: will address at next visit   -Hep C, HIV: will address at next visit  Social   -EtOH:  reports that he does not currently use alcohol.   -Tobacco:  reports that he has been smoking cigars. He has never used smokeless tobacco.   -Drugs:  reports no history of drug use.    -Depression:   Depression: Low Risk  (7/5/2022)    Depression     Last PHQ-4: Flowsheet Data: 2          Return to clinic in 4 month(s).    Solo Robison DO  [facility] Internal Medicine PGY-2    Orders Placed This Encounter    Influenza - Quadrivalent *Preferred* (6 months+) (PF)    influenza (QUADRIVALENT PF) vaccine 0.5 mL    nicotine (NICODERM CQ) 21 mg/24 hr

## 2024-01-31 ENCOUNTER — PROCEDURE VISIT (OUTPATIENT)
Dept: NEUROLOGY | Facility: HOSPITAL | Age: 34
End: 2024-01-31
Payer: COMMERCIAL

## 2024-01-31 DIAGNOSIS — R56.9 SEIZURE: ICD-10-CM

## 2024-01-31 PROCEDURE — 95819 EEG AWAKE AND ASLEEP: CPT | Mod: 26,,, | Performed by: PSYCHIATRY & NEUROLOGY

## 2024-01-31 PROCEDURE — 95816 EEG AWAKE AND DROWSY: CPT

## 2024-01-31 NOTE — PROCEDURES
ROUTINE EEG    Date/Time: 1/31/2024 9:30 AM    Performed by: Carmen Wynne MD  Authorized by: Solo Robison DO      Indication: Seizure, NOS    Clinical Information: This is a 33 year old male with history of seizure disorder.     Anticonvulsants: Depakote 1000 mg at bedtime    Recording Condition: This is a routine electroencephalogram performed in outpatient settings using Prescient/[employer] software. Electroencephalogram leads were placed using a 10-20 placement system. The electroencephalogram is monitored in real time by a technologist and is of good technical quality for review.     Technique: Electroencephalogram leads were placed using a 10-20 placement system and electrode impedance was maintained below 10KOhms. Chapito and seizure detection computer program was used for digital EEG analysis throughout the monitoring period, to screen the EEG in real time and marah the data file with pointers to electrographic seizure and interictal discharges. Hyperventilation was performed and Photic stimulation was performed.     Description:   Background Symmetry- Symmetric  Frequency - Beta and Alpha  Voltage - Normal   Continuity - Continuous  Anterior to Posterior Gradient - Present  Posterior Dominant Rhythm - 8.5 Hz   Reactivity - Unclear  State Changes - Present with normal sleep stage N2 transients  Breach Artifact - Absent    Cyclic Alternating Pattern of Encephalopathy (CAPE) - Absent  Sporadic Epileptiform Discharges - Present: frequent right posterior temporal and parietal spike and wave during drowsiness.   Rhythmic or Periodic Patterns (RPPs) - Absent    Electroclinical Seizures (ECSz): Absent  Electroclinical Status Epilepticus (ECSE): Absent  Electrographical Seizures (Esz): Absent    Briefly Potentially Ictal Rhythmic Discharges (BIRDs): Absent  Ictal-Interictal Continuum (IIC): Absent    Conclusion: This is an abnormal routine awake and asleep EEG with right posterior temporal epileptiform discharge.      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 There is however no ongoing seizure activity, nor is there any evidence of status epilepticus in this study. Clinical correlation is advised.

## 2024-02-07 ENCOUNTER — HOSPITAL ENCOUNTER (EMERGENCY)
Facility: HOSPITAL | Age: 34
Discharge: HOME | End: 2024-02-07
Attending: INTERNAL MEDICINE
Payer: COMMERCIAL

## 2024-02-07 VITALS
RESPIRATION RATE: 19 BRPM | DIASTOLIC BLOOD PRESSURE: 98 MMHG | OXYGEN SATURATION: 99 % | BODY MASS INDEX: 22.89 KG/M2 | TEMPERATURE: 98 F | HEART RATE: 80 BPM | HEIGHT: 69 IN | WEIGHT: 154.56 LBS | SYSTOLIC BLOOD PRESSURE: 121 MMHG

## 2024-02-07 DIAGNOSIS — T14.8XXA SKIN ABRASION: Primary | ICD-10-CM

## 2024-02-07 DIAGNOSIS — J06.9 VIRAL URI: ICD-10-CM

## 2024-02-07 LAB
FLUAV AG UPPER RESP QL IA.RAPID: NOT DETECTED
FLUBV AG UPPER RESP QL IA.RAPID: NOT DETECTED
RSV A 5' UTR RNA NPH QL NAA+PROBE: NOT DETECTED
SARS-COV-2 RNA RESP QL NAA+PROBE: NOT DETECTED

## 2024-02-07 PROCEDURE — 25000003 PHARM REV CODE 250: Performed by: PHYSICIAN ASSISTANT

## 2024-02-07 PROCEDURE — 99283 EMERGENCY DEPT VISIT LOW MDM: CPT

## 2024-02-07 PROCEDURE — 0241U COVID/RSV/FLU A&B PCR: CPT | Performed by: PHYSICIAN ASSISTANT

## 2024-02-07 RX ORDER — MUPIROCIN 20 MG/G
OINTMENT TOPICAL 3 TIMES DAILY
Qty: 22 G | Refills: 0 | Status: SHIPPED | OUTPATIENT
Start: 2024-02-07 | End: 2024-02-12

## 2024-02-07 RX ORDER — FLUTICASONE PROPIONATE 50 MCG
1 SPRAY, SUSPENSION (ML) NASAL 2 TIMES DAILY PRN
Qty: 15 G | Refills: 0 | Status: SHIPPED | OUTPATIENT
Start: 2024-02-07 | End: 2024-02-14

## 2024-02-07 RX ORDER — LORATADINE 10 MG/1
10 TABLET ORAL DAILY
Qty: 14 TABLET | Refills: 0 | Status: SHIPPED | OUTPATIENT
Start: 2024-02-07 | End: 2024-03-11

## 2024-02-07 RX ADMIN — BACITRACIN ZINC, NEOMYCIN, POLYMYXIN B SULFAT 1 EACH: 5000; 3.5; 4 OINTMENT TOPICAL at 04:02

## 2024-02-07 NOTE — ED PROVIDER NOTES
Encounter Date: 2/7/2024       History     Chief Complaint   Patient presents with    Wound Check    Nasal Congestion     Small wound to left lower nare and right base of thumb x 3 days; does not recall scratching or any trauma to the area. Reports recent cold symptoms.     Patient reports to the emergency room with complaints of cough cold symptoms for the past 2 days; patient also reports a small break in his skin near the left lower nostril as well as on his right base of his thumb; patient is unsure if he scratched himself on accident    The history is provided by the patient.   URI  The primary symptoms include cough. Primary symptoms do not include fever, sore throat, nausea or rash.   The cough began yesterday.   Symptoms associated with the illness include congestion.     Review of patient's allergies indicates:  No Known Allergies  Past Medical History:   Diagnosis Date    Deaf     Hypertension     Migraines     Seizures      Past Surgical History:   Procedure Laterality Date    ABDOMINAL SURGERY      IMPLANTATION OF COCHLEAR PROSTHESIS      INNER EAR SURGERY Right      Family History   Problem Relation Age of Onset    Hypertension Mother      Social History     Tobacco Use    Smoking status: Every Day     Types: Cigars    Smokeless tobacco: Never   Substance Use Topics    Alcohol use: Not Currently    Drug use: Never     Review of Systems   Constitutional:  Negative for fever.   HENT:  Positive for congestion. Negative for sore throat.    Respiratory:  Positive for cough. Negative for shortness of breath.    Cardiovascular:  Negative for chest pain.   Gastrointestinal:  Negative for nausea.   Genitourinary:  Negative for dysuria.   Musculoskeletal:  Negative for back pain.   Skin:  Negative for rash.   Neurological:  Negative for weakness.   Hematological:  Does not bruise/bleed easily.   Psychiatric/Behavioral: Negative.         Physical Exam     Initial Vitals [02/07/24 1504]   BP Pulse Resp Temp SpO2    (!) 131/91 80 16 98.3 °F (36.8 °C) 98 %      MAP       --         Physical Exam    Vitals reviewed.  Constitutional: He appears well-developed and well-nourished.   HENT:   Head: Normocephalic and atraumatic.   Nose: Nose normal.       Small break in the skin present; no current bleeding or drainage consistent with possible scratched with a fingernail   Eyes: Conjunctivae and EOM are normal. Pupils are equal, round, and reactive to light.   Neck:   Normal range of motion.  Cardiovascular:  Normal rate, regular rhythm, normal heart sounds and intact distal pulses.           Pulmonary/Chest: Breath sounds normal. No respiratory distress. He has no wheezes. He exhibits no tenderness.   Abdominal: Abdomen is soft. Bowel sounds are normal. He exhibits no distension. There is no abdominal tenderness.   Musculoskeletal:         General: Normal range of motion.        Hands:       Cervical back: Normal range of motion.      Comments: Abrasion present as marked; normal range of motion present     Neurological: He is alert and oriented to person, place, and time. He displays normal reflexes. No cranial nerve deficit or sensory deficit. GCS score is 15. GCS eye subscore is 4. GCS verbal subscore is 5. GCS motor subscore is 6.   Skin: Skin is warm. No pallor.   Psychiatric: He has a normal mood and affect. His behavior is normal. Judgment and thought content normal.         ED Course   Procedures  Labs Reviewed   COVID/RSV/FLU A&B PCR - Normal    Narrative:     The Xpert Xpress SARS-CoV-2/FLU/RSV plus is a rapid, multiplexed real-time PCR test intended for the simultaneous qualitative detection and differentiation of SARS-CoV-2, Influenza A, Influenza B, and respiratory syncytial virus (RSV) viral RNA in either nasopharyngeal swab or nasal swab specimens.                Imaging Results    None          Medications   neomycin-bacitracnZn-polymyxnB packet (1 each Topical (Top) Given 2/7/24 8686)     Medical Decision  Making  Risk  OTC drugs.  Prescription drug management.  Risk Details: Given strict ED return precautions. I have spoken with the patient and/or caregivers. I have explained the patient's condition, diagnoses and treatment plan based on the information available to me at this time. I have answered the patient's and/or caregiver's questions and addressed any concerns. The patient and/or caregivers have as good an understanding of the patient's diagnosis, condition and treatment plan as can be expected at this point. The vital signs have been stable. The patient's condition is stable and appropriate for discharge from the emergency department.      The patient will pursue further outpatient evaluation with the primary care physician or other designated or consulting physician as outlined in the discharge instructions. The patient and/or caregivers are agreeable to this plan of care and follow-up instructions have been explained in detail. The patient and/or caregivers have received these instructions in written format and have expressed an understanding of the discharge instructions. The patient and/or caregivers are aware that any significant change in condition or worsening of symptoms should prompt an immediate return to this or the closest emergency department or a call to 911.                                      Clinical Impression:  Final diagnoses:  [T14.8XXA] Skin abrasion (Primary)  [J06.9] Viral URI          ED Disposition Condition    Discharge Stable          ED Prescriptions       Medication Sig Dispense Start Date End Date Auth. Provider    mupirocin (BACTROBAN) 2 % ointment Apply topically 3 (three) times daily. for 5 days 22 g 2/7/2024 2/12/2024 Candido Escobar PA    fluticasone propionate (FLONASE) 50 mcg/actuation nasal spray 1 spray (50 mcg total) by Each Nostril route 2 (two) times daily as needed for Rhinitis. 15 g 2/7/2024 2/14/2024 Candido Escobar PA    loratadine (CLARITIN) 10 mg tablet  Take 1 tablet (10 mg total) by mouth once daily. for 14 days 14 tablet 2/7/2024 2/21/2024 Candido Escobar PA          Follow-up Information       Follow up With Specialties Details Why Contact Info    Solo Robison DO Internal Medicine   [address] [facility] [place] [address] [phone]      discharge followup    If your symptoms become WORSE or you DO NOT IMPROVE and you are unable to reach your health care provider, you should RETURN to the emergency department    discharge info    Discussed all pertinent ED information, results, diagnosis and treatment plan; All questions and concerns were addressed at this time. Patient voices understanding of information and instructions. Patient is comfortable with plan and discharge             Candido Escobar PA  02/07/24 0838

## 2024-02-29 ENCOUNTER — HOSPITAL ENCOUNTER (EMERGENCY)
Facility: HOSPITAL | Age: 34
Discharge: HOME | End: 2024-02-29
Attending: FAMILY MEDICINE
Payer: COMMERCIAL

## 2024-02-29 VITALS
SYSTOLIC BLOOD PRESSURE: 128 MMHG | TEMPERATURE: 98 F | RESPIRATION RATE: 19 BRPM | WEIGHT: 240 LBS | DIASTOLIC BLOOD PRESSURE: 78 MMHG | HEART RATE: 80 BPM | OXYGEN SATURATION: 99 % | BODY MASS INDEX: 35.44 KG/M2

## 2024-02-29 DIAGNOSIS — R53.83 FATIGUE, UNSPECIFIED TYPE: Primary | ICD-10-CM

## 2024-02-29 DIAGNOSIS — M79.601 RIGHT ARM PAIN: ICD-10-CM

## 2024-02-29 PROCEDURE — 93005 ELECTROCARDIOGRAM TRACING: CPT

## 2024-02-29 PROCEDURE — 63600175 PHARM REV CODE 636 W HCPCS: Performed by: PHYSICIAN ASSISTANT

## 2024-02-29 PROCEDURE — 99284 EMERGENCY DEPT VISIT MOD MDM: CPT | Mod: 25

## 2024-02-29 PROCEDURE — 96372 THER/PROPH/DIAG INJ SC/IM: CPT | Performed by: PHYSICIAN ASSISTANT

## 2024-02-29 PROCEDURE — 25000003 PHARM REV CODE 250: Performed by: PHYSICIAN ASSISTANT

## 2024-02-29 RX ORDER — KETOROLAC TROMETHAMINE 30 MG/ML
30 INJECTION, SOLUTION INTRAMUSCULAR; INTRAVENOUS
Status: COMPLETED | OUTPATIENT
Start: 2024-02-29 | End: 2024-02-29

## 2024-02-29 RX ORDER — HYDROXYZINE PAMOATE 25 MG/1
25 CAPSULE ORAL NIGHTLY
Qty: 30 CAPSULE | Refills: 0 | Status: SHIPPED | OUTPATIENT
Start: 2024-02-29 | End: 2024-03-11

## 2024-02-29 RX ORDER — HYDROXYZINE PAMOATE 25 MG/1
25 CAPSULE ORAL
Status: COMPLETED | OUTPATIENT
Start: 2024-02-29 | End: 2024-02-29

## 2024-02-29 RX ADMIN — HYDROXYZINE PAMOATE 25 MG: 25 CAPSULE ORAL at 08:02

## 2024-02-29 RX ADMIN — KETOROLAC TROMETHAMINE 30 MG: 30 INJECTION, SOLUTION INTRAMUSCULAR; INTRAVENOUS at 08:02

## 2024-03-01 LAB
OHS QRS DURATION: 82 MS
OHS QTC CALCULATION: 406 MS

## 2024-03-01 NOTE — ED PROVIDER NOTES
Encounter Date: 2/29/2024       History     Chief Complaint   Patient presents with    Generalized Body Aches     DEAF PT W COCHLEAR IMPLANT W CO WEAKNESS, HA AND BODY ACHE SINCE THIS AM.  DENIES COUGH. EKG OBTAINED.     Headache     33-year-old deaf male with cochlear implants and a history of hypertension, presents to the emergency department with his father with complaints of generalized fatigue, right arm pain that began today and difficulty sleeping at night for weeks.  Patient states he been cooking lot at work and now his arm feels sore.  Patient's dad is requesting medications to help with the pain and for medication to help him relax so he can get some sleep at night.  Denies injury.    The history is provided by the patient and a parent. No  was used.     Review of patient's allergies indicates:  No Known Allergies  Past Medical History:   Diagnosis Date    Deaf     Hypertension     Migraines     Seizures      Past Surgical History:   Procedure Laterality Date    ABDOMINAL SURGERY      IMPLANTATION OF COCHLEAR PROSTHESIS      INNER EAR SURGERY Right      Family History   Problem Relation Age of Onset    Hypertension Mother      Social History     Tobacco Use    Smoking status: Every Day     Types: Cigars    Smokeless tobacco: Never   Substance Use Topics    Alcohol use: Not Currently    Drug use: Never     Review of Systems   Constitutional:  Positive for fatigue. Negative for chills and fever.   Respiratory:  Negative for cough and shortness of breath.    Cardiovascular:  Negative for chest pain and palpitations.   Gastrointestinal:  Negative for abdominal pain, nausea and vomiting.   Genitourinary:  Negative for dysuria and flank pain.   Musculoskeletal:  Negative for back pain and neck pain.        Right arm pain      Skin:  Negative for rash.   Neurological:  Negative for dizziness, weakness and light-headedness.       Physical Exam     Initial Vitals [02/29/24 1831]   BP Pulse Resp  Temp SpO2   127/83 75 16 97.5 °F (36.4 °C) 99 %      MAP       --         Physical Exam    Nursing note and vitals reviewed.  Constitutional: He appears well-developed and well-nourished.   HENT:   Head: Normocephalic and atraumatic.   Nose: Nose normal.   Mouth/Throat: Oropharynx is clear and moist.   Eyes: Conjunctivae are normal.   Neck: Neck supple.   Normal range of motion.  Cardiovascular:  Normal rate, regular rhythm, normal heart sounds and intact distal pulses.           Pulmonary/Chest: Breath sounds normal. No respiratory distress. He has no wheezes.   Abdominal: Abdomen is soft. Bowel sounds are normal. There is no abdominal tenderness.   Musculoskeletal:         General: No tenderness. Normal range of motion.      Cervical back: Normal range of motion and neck supple.     Neurological: He is alert and oriented to person, place, and time. GCS score is 15. GCS eye subscore is 4. GCS verbal subscore is 5. GCS motor subscore is 6.   Skin: Skin is warm. Capillary refill takes less than 2 seconds.         ED Course   Procedures  Labs Reviewed - No data to display  EKG Readings: (Independently Interpreted)   Initial Reading: No STEMI. Rhythm: Normal Sinus Rhythm. Heart Rate: 70. Ectopy: No Ectopy. ST Segments: Normal ST Segments.     ECG Results              EKG 12-lead (Shortness of Breath) Age > 50 (Final result)        Collection Time Result Time QRS Duration [facility] QTC Calculation    02/29/24 18:13:46 03/01/24 13:37:47 82 406                     Final result by Interface, Lab In [facility] (03/01/24 13:37:49)                   Narrative:    Test Reason : R06.02,    Vent. Rate : 070 BPM     Atrial Rate : 070 BPM     P-R Int : 144 ms          QRS Dur : 082 ms      QT Int : 376 ms       P-R-T Axes : 075 083 030 degrees     QTc Int : 406 ms    Normal sinus rhythm  Normal ECG  When compared with ECG of 30-JUN-2023 20:19,  No significant change was found  Confirmed by Ginette Fontanez MD ([id number]) on 3/1/2024 1:37:42  PM    Referred By:             Confirmed By:Ginette Fontanez MD                                  Imaging Results    None          Medications   ketorolac injection 30 mg (30 mg Intramuscular Given 2/29/24 2004)   hydrOXYzine pamoate capsule 25 mg (25 mg Oral Given 2/29/24 2004)     Medical Decision Making  33-year-old deaf male with cochlear implants and a history of hypertension, presents to the emergency department with his father with complaints of generalized fatigue, right arm pain that began today and difficulty sleeping at night for weeks.  Patient states he been cooking lot at work and now his arm feels sore.  Patient's dad is requesting medications to help with the pain and for medication to help him relax so he can get some sleep at night.  Denies injury.    DDx:  Musculoskeletal pain, anxiety, fatigue    Patient and his father declined blood work and imaging.  Normal EKG.  Just requesting medications to help with the symptoms and they will return if needed.  Toradol injection given in the ED for musculoskeletal pain along with Vistaril for anxiety so he can relax.  Vistaril prescribed    Amount and/or Complexity of Data Reviewed  ECG/medicine tests: independent interpretation performed. Decision-making details documented in ED Course.    Risk  Prescription drug management.                                      Clinical Impression:  Final diagnoses:  [R53.83] Fatigue, unspecified type (Primary)  [M79.601] Right arm pain          ED Disposition Condition    Discharge Stable          ED Prescriptions       Medication Sig Dispense Start Date End Date Auth. Provider    hydrOXYzine pamoate (VISTARIL) 25 MG Cap Take 1 capsule (25 mg total) by mouth every evening. for 30 doses 30 capsule 2/29/2024 3/30/2024 Radha Joseph PA          Follow-up Information       Follow up With Specialties Details Why Contact Info [facility] Emergency Medicine  As needed, If symptoms worsen [address]  [facility] [address]  [phone]    Solo Robison, DO Internal Medicine Schedule an appointment as soon as possible for a visit in 2 days  [address] [facility] [address]  [phone]               Radha Joseph PA  03/02/24 0104

## 2024-03-01 NOTE — DISCHARGE INSTRUCTIONS
Take Tylenol & Motrin as needed for muscle pain.  Take hydroxyzine at night to help you relax/sleep.  Return if symptoms worsen.  Follow up with the doctor within 1-2 days.

## 2024-03-11 ENCOUNTER — OFFICE VISIT (OUTPATIENT)
Dept: BEHAVIORAL HEALTH | Facility: CLINIC | Age: 34
End: 2024-03-11
Payer: COMMERCIAL

## 2024-03-11 VITALS
TEMPERATURE: 99 F | HEART RATE: 74 BPM | OXYGEN SATURATION: 99 % | DIASTOLIC BLOOD PRESSURE: 88 MMHG | BODY MASS INDEX: 23.78 KG/M2 | WEIGHT: 161 LBS | SYSTOLIC BLOOD PRESSURE: 139 MMHG

## 2024-03-11 DIAGNOSIS — G47.00 INSOMNIA, UNSPECIFIED TYPE: ICD-10-CM

## 2024-03-11 DIAGNOSIS — F25.9 SCHIZOAFFECTIVE DISORDER, UNSPECIFIED TYPE: Primary | ICD-10-CM

## 2024-03-11 PROCEDURE — 3075F SYST BP GE 130 - 139MM HG: CPT | Mod: CPTII,,, | Performed by: STUDENT IN AN ORGANIZED HEALTH CARE EDUCATION/TRAINING PROGRAM

## 2024-03-11 PROCEDURE — 99213 OFFICE O/P EST LOW 20 MIN: CPT | Mod: PBBFAC,PN | Performed by: STUDENT IN AN ORGANIZED HEALTH CARE EDUCATION/TRAINING PROGRAM

## 2024-03-11 PROCEDURE — 1159F MED LIST DOCD IN RCRD: CPT | Mod: CPTII,,, | Performed by: STUDENT IN AN ORGANIZED HEALTH CARE EDUCATION/TRAINING PROGRAM

## 2024-03-11 PROCEDURE — 3008F BODY MASS INDEX DOCD: CPT | Mod: CPTII,,, | Performed by: STUDENT IN AN ORGANIZED HEALTH CARE EDUCATION/TRAINING PROGRAM

## 2024-03-11 PROCEDURE — 3079F DIAST BP 80-89 MM HG: CPT | Mod: CPTII,,, | Performed by: STUDENT IN AN ORGANIZED HEALTH CARE EDUCATION/TRAINING PROGRAM

## 2024-03-11 PROCEDURE — 1160F RVW MEDS BY RX/DR IN RCRD: CPT | Mod: CPTII,,, | Performed by: STUDENT IN AN ORGANIZED HEALTH CARE EDUCATION/TRAINING PROGRAM

## 2024-03-11 PROCEDURE — 99205 OFFICE O/P NEW HI 60 MIN: CPT | Mod: S$PBB,,, | Performed by: STUDENT IN AN ORGANIZED HEALTH CARE EDUCATION/TRAINING PROGRAM

## 2024-03-11 RX ORDER — QUETIAPINE FUMARATE 100 MG/1
TABLET, FILM COATED ORAL
Qty: 60 TABLET | Refills: 2 | Status: SHIPPED | OUTPATIENT
Start: 2024-03-11 | End: 2024-04-25
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
## 2024-04-17 ENCOUNTER — HOSPITAL ENCOUNTER (EMERGENCY)
Facility: HOSPITAL | Age: 34
Discharge: HOME | End: 2024-04-17
Attending: INTERNAL MEDICINE
Payer: COMMERCIAL

## 2024-04-17 VITALS
SYSTOLIC BLOOD PRESSURE: 112 MMHG | HEART RATE: 64 BPM | HEIGHT: 70 IN | DIASTOLIC BLOOD PRESSURE: 76 MMHG | BODY MASS INDEX: 23.31 KG/M2 | OXYGEN SATURATION: 100 % | TEMPERATURE: 98 F | WEIGHT: 162.81 LBS | RESPIRATION RATE: 20 BRPM

## 2024-04-17 DIAGNOSIS — R21 RASH: Primary | ICD-10-CM

## 2024-04-17 DIAGNOSIS — R21 RASH AND NONSPECIFIC SKIN ERUPTION: ICD-10-CM

## 2024-04-17 PROCEDURE — 99283 EMERGENCY DEPT VISIT LOW MDM: CPT

## 2024-04-17 RX ORDER — TRIAMCINOLONE ACETONIDE 5 MG/G
OINTMENT TOPICAL 2 TIMES DAILY
Qty: 30 G | Refills: 0 | Status: SHIPPED | OUTPATIENT
Start: 2024-04-17

## 2024-04-17 NOTE — Clinical Note
"Chun Patel" Amado was seen and treated in our emergency department on 4/17/2024.  He may return to work on 04/19/2024.       If you have any questions or concerns, please don't hesitate to call.      Cathleen Solorio, ADRIÁN"

## 2024-04-18 NOTE — ED PROVIDER NOTES
Encounter Date: 4/17/2024       History     Chief Complaint   Patient presents with    Rash    Neck Swelling     The history is provided by the patient. The history is limited by a language barrier. A  was used.   Rash   This is a new problem. The current episode started two days ago. The problem has been unchanged. The problem is associated with an unknown factor. The rash is present on the neck. Associated symptoms include itching. Pertinent negatives include no blisters, no pain and no weeping. Treatments tried: alcohol swabs. The treatment provided no relief.     Review of patient's allergies indicates:  No Known Allergies  Past Medical History:   Diagnosis Date    Deaf     Hypertension     Migraines     Seizures      Past Surgical History:   Procedure Laterality Date    ABDOMINAL SURGERY      IMPLANTATION OF COCHLEAR PROSTHESIS      INNER EAR SURGERY Right      Family History   Problem Relation Name Age of Onset    Hypertension Mother       Social History     Tobacco Use    Smoking status: Every Day     Types: Cigars    Smokeless tobacco: Never   Substance Use Topics    Alcohol use: Not Currently    Drug use: Never     Review of Systems   Constitutional:  Negative for fever.   HENT: Negative.  Negative for rhinorrhea, sore throat and trouble swallowing.    Eyes: Negative.    Respiratory:  Negative for shortness of breath.    Cardiovascular:  Negative for chest pain.   Gastrointestinal: Negative.  Negative for nausea.   Genitourinary:  Negative for dysuria.   Musculoskeletal:  Negative for back pain.   Skin:  Positive for itching and rash.   Allergic/Immunologic: Negative.    Neurological: Negative.  Negative for weakness.   Hematological:  Does not bruise/bleed easily.   Psychiatric/Behavioral: Negative.     All other systems reviewed and are negative.      Physical Exam     Initial Vitals [04/17/24 2015]   BP Pulse Resp Temp SpO2   112/76 64 20 97.7 °F (36.5 °C) 100 %      MAP       --          Physical Exam    Nursing note and vitals reviewed.  Constitutional: He appears well-developed and well-nourished.   HENT:   Head: Normocephalic and atraumatic.   Eyes: Pupils are equal, round, and reactive to light.   Neck: Neck supple.   Normal range of motion.  Cardiovascular:  Normal rate and regular rhythm.           Pulmonary/Chest: Breath sounds normal.   Abdominal: Abdomen is soft.   Musculoskeletal:         General: Normal range of motion.      Cervical back: Normal range of motion and neck supple.     Neurological: He is alert and oriented to person, place, and time.   Skin: Skin is warm, dry and intact. Capillary refill takes less than 2 seconds. Rash noted. Rash is maculopapular. Rash is not papular.   Three small maculopapular lesions on neck near neck line. Lesions are not inflammed or fluctuant.    Psychiatric: He has a normal mood and affect.         ED Course   Procedures  Labs Reviewed - No data to display       Imaging Results    None          Medications - No data to display  Medical Decision Making  Risk  Prescription drug management.                                      Clinical Impression:  Final diagnoses:  [R21] Rash (Primary)  [R21] Rash and nonspecific skin eruption                 Cathleen Solorio, ADRIÁN  04/17/24 2111       Cathleen Solorio FNP  04/17/24 2114

## 2024-04-25 ENCOUNTER — OFFICE VISIT (OUTPATIENT)
Dept: BEHAVIORAL HEALTH | Facility: CLINIC | Age: 34
End: 2024-04-25
Payer: COMMERCIAL

## 2024-04-25 VITALS
DIASTOLIC BLOOD PRESSURE: 83 MMHG | TEMPERATURE: 98 F | BODY MASS INDEX: 23.86 KG/M2 | SYSTOLIC BLOOD PRESSURE: 122 MMHG | WEIGHT: 166.69 LBS | HEART RATE: 76 BPM | HEIGHT: 70 IN

## 2024-04-25 DIAGNOSIS — G47.00 INSOMNIA, UNSPECIFIED TYPE: ICD-10-CM

## 2024-04-25 DIAGNOSIS — F25.9 SCHIZOAFFECTIVE DISORDER, UNSPECIFIED TYPE: Primary | ICD-10-CM

## 2024-04-25 PROCEDURE — 99214 OFFICE O/P EST MOD 30 MIN: CPT | Mod: S$PBB,,, | Performed by: STUDENT IN AN ORGANIZED HEALTH CARE EDUCATION/TRAINING PROGRAM

## 2024-04-25 PROCEDURE — 1159F MED LIST DOCD IN RCRD: CPT | Mod: CPTII,,, | Performed by: STUDENT IN AN ORGANIZED HEALTH CARE EDUCATION/TRAINING PROGRAM

## 2024-04-25 PROCEDURE — 99213 OFFICE O/P EST LOW 20 MIN: CPT | Mod: PBBFAC,PN | Performed by: STUDENT IN AN ORGANIZED HEALTH CARE EDUCATION/TRAINING PROGRAM

## 2024-04-25 PROCEDURE — 1160F RVW MEDS BY RX/DR IN RCRD: CPT | Mod: CPTII,,, | Performed by: STUDENT IN AN ORGANIZED HEALTH CARE EDUCATION/TRAINING PROGRAM

## 2024-04-25 PROCEDURE — 3074F SYST BP LT 130 MM HG: CPT | Mod: CPTII,,, | Performed by: STUDENT IN AN ORGANIZED HEALTH CARE EDUCATION/TRAINING PROGRAM

## 2024-04-25 PROCEDURE — 3008F BODY MASS INDEX DOCD: CPT | Mod: CPTII,,, | Performed by: STUDENT IN AN ORGANIZED HEALTH CARE EDUCATION/TRAINING PROGRAM

## 2024-04-25 PROCEDURE — 3079F DIAST BP 80-89 MM HG: CPT | Mod: CPTII,,, | Performed by: STUDENT IN AN ORGANIZED HEALTH CARE EDUCATION/TRAINING PROGRAM

## 2024-04-25 RX ORDER — ARIPIPRAZOLE 5 MG/1
5 TABLET ORAL DAILY
Qty: 30 TABLET | Refills: 11 | Status: SHIPPED | OUTPATIENT
Start: 2024-04-25 | End: 2025-04-25

## 2024-04-25 RX ORDER — OLANZAPINE 5 MG/1
5 TABLET ORAL NIGHTLY
Qty: 30 TABLET | Refills: 5 | Status: SHIPPED | OUTPATIENT
Start: 2024-04-25 | End: 2024-04-25

## 2024-04-25 NOTE — PROGRESS NOTES
"Outpatient Psychiatry Follow-Up Visit    4/25/2024    Clinical Status of Patient:  Outpatient (Ambulatory)    Chief Complaint:  Chun Fischer is a 33 y.o. male who presents today for follow-up of psychosis. Patient last seen for initial evaluation on 3/11/2024. Met with patient.   [id number] utilized for communication during visit.     Interval History and Content of Current Session:  Interim Events/Subjective Report/Content of Current Session:   Pt reports doing "ok"  overall.  Notes poor reaction to seroquel, notes oversedation, stopped taking due to this.  Works 2 days weekly but hours are inconsistent and often unpredictable.  Reports "ok" mood, occasionally down when he feels picked on for his hearing difficulty.  Denies currently feeling depressed, fair anxiety  Sleeping well.  Appetite increased, weight stable.  Energy low, motivation fair.  Denies irritability, denies hopelessness.  Denies SI/HI/AVH/paranoia, denies plan or desire for self harm or harm to others.  Reports SE from current regimen: sedation from seroquel (self discontinued). Denies somatic complaints.   Pt voices desire to adjust regimen to address ongoing symptoms.      Psychiatric Review of Systems-is patient experiencing or having changes in  Integrated into HPI above.     Review of Systems   PSYCHIATRIC: Pertinant items are noted in the narrative.  CONSTITUTIONAL: No weight gain or loss.  MUSCULOSKELETAL: No pain or stiffness of the joints.  NEUROLOGIC: No weakness, sensory changes, seizures, confusion, memory loss, tremor or other abnormal movements.  CARDIAC: No CP, no palpitations  RESPIRATORY: No shortness of breath.  CARDIOVASCULAR: No tachycardia or chest pain.  GASTROINTESTINAL: No nausea, vomiting, pain, constipation or diarrhea.    Past Medical, Family and Social History: The patient's past medical, family and social history have been reviewed and updated as appropriate within the electronic medical " "record - see encounter notes.    Compliance: poor, self discontinued due to SE    Side effects: sedation from seroquel    Risk Parameters:  Patient reports no suicidal ideation  Patient reports no homicidal ideation  Patient reports no self-injurious behavior  Patient reports no violent behavior    Exam (detailed: at least 9 elements; comprehensive: all 15 elements)   Constitutional  Vitals:  Most recent vital signs, dated less than 90 days prior to this appointment, were reviewed.     Vitals:    04/25/24 1227   BP: 122/83   Pulse: 76   Temp: 98 °F (36.7 °C)   TempSrc: Oral   Weight: 75.6 kg (166 lb 11.2 oz)   Height: 5' 10" (1.778 m)        General:   Constitutional: No acute distress, appears stated age, casually dressed    Neurologic:   Motor: moves all extremities spontaneously and without difficulty, no abnormal involuntary movements observed  Gait: normal gait and station    Mental status examination:   Appearance: appears stated age, casually dressed, no acute distress  Behavior: unremarkable for situation, calm and cooperative  Mood: "ok"  Affect: mood congruent and constricted  Thought process: linear and goal directed  Thought content: no plan or desire for self harm or harm to others, denies paranoia, no delusional ideation volunteered  Perceptions: denies hallucinations or other altered perceptions  Associations: appropriate for conversation  Orientation: oriented to day of week, month, year, location, and situation  Language: English, fluid  Attention: able to attend to interview  Insight: good  Judgement: good    PHQ9:  Over the last two weeks how often have you been bothered by little interest or pleasure in doing things: 2  Over the last two weeks how often have you been bothered by feeling down, depressed or hopeless: 2  PHQ-2 Total Score: 4  PHQ-9 Score: 21  PHQ-9 Interpretation: Severe          3/11/2024     9:00 AM   GAD7   1. Feeling nervous, anxious, or on edge? 2   2. Not being able to stop or " control worrying? 2   3. Worrying too much about different things? 2   4. Trouble relaxing? 2   5. Being so restless that it is hard to sit still? 2   6. Becoming easily annoyed or irritable? 3   7. Feeling afraid as if something awful might happen? 1   8. If you checked off any problems, how difficult have these problems made it for you to do your work, take care of things at home, or get along with other people? 3   EZIO-7 Score 14       Assessment and Diagnosis   Status/Progress: Based on the examination today, the patient's problem(s) is/are adequately but not ideally controlled.  New problems have not been presented today.   SE from treatment are complicating management of the primary condition.  Number of separate conditions addressed during today's visit: 2 (mood fair, sleep uncontrolled) .  Are medication adjustments being made today: Yes.  Are referral(s) being ordered today: No.  Complexity (level) of medical decision making employed in the encounter: MODERATE.    General Impression:    ICD-10-CM ICD-9-CM   1. Schizoaffective disorder, unspecified type  F25.9 295.70   2. Insomnia, unspecified type  G47.00 780.52       Intervention/Counseling/Treatment Plan   Will continue to utilize  during future visits to ensure understanding, interview/treatment planning complicated by sensory deficit (  Stop seroquel due to SE  Start abilify 5mg daily  No need for PEC as pt is not an imminent danger to self or others or gravely disabled due to acute psychiatric illness  Discussed that pt should either call clinic for psychiatric crisis symptoms or present to nearest emergency room    Discussed with patient informed consent including diagnosis, risks and benefits of proposed treatment above vs. alternative treatments vs. no treatment, as well as serious and common side effects of these treatments, and the inherent unpredictability of individual responses to these treatments. The patient expresses  understanding of the above and displays the capacity to agree with this current plan. Patient also agrees that, currently, the benefits outweigh the risks and would like to pursue treatment at this time, and had no other questions.    Instructions:  Take all medications as prescribed.    Abstain from recreational drugs and alcohol.  Present to ED or call 911 for SI/HI plan or intent, psychosis, or medical emergency.    Return to Clinic: Follow up in about 6 weeks (around 6/6/2024).    Total time:   The total time for services performed on the date of the encounter (including review of prior visit notes, review of notes from other providers, review of results from laboratory/imaging studies, face-to-face time with patient, and time spent on other activities directly related to patient care): 45 minutes.    Salvador Rowe MD  [facility]

## 2024-05-08 ENCOUNTER — OFFICE VISIT (OUTPATIENT)
Dept: INTERNAL MEDICINE | Facility: CLINIC | Age: 34
End: 2024-05-08
Payer: COMMERCIAL

## 2024-05-08 VITALS
TEMPERATURE: 98 F | DIASTOLIC BLOOD PRESSURE: 82 MMHG | RESPIRATION RATE: 20 BRPM | OXYGEN SATURATION: 100 % | HEIGHT: 70 IN | BODY MASS INDEX: 24.05 KG/M2 | HEART RATE: 69 BPM | SYSTOLIC BLOOD PRESSURE: 130 MMHG | WEIGHT: 168 LBS

## 2024-05-08 DIAGNOSIS — R06.83 SNORING: Primary | ICD-10-CM

## 2024-05-08 DIAGNOSIS — Z00.00 HEALTHCARE MAINTENANCE: ICD-10-CM

## 2024-05-08 DIAGNOSIS — F32.A DEPRESSION, UNSPECIFIED DEPRESSION TYPE: ICD-10-CM

## 2024-05-08 DIAGNOSIS — F17.200 TOBACCO USE DISORDER: ICD-10-CM

## 2024-05-08 DIAGNOSIS — B35.0 TINEA BARBAE: ICD-10-CM

## 2024-05-08 PROCEDURE — 99215 OFFICE O/P EST HI 40 MIN: CPT | Mod: PBBFAC

## 2024-05-08 RX ORDER — IBUPROFEN 200 MG
1 TABLET ORAL DAILY
Qty: 90 PATCH | Refills: 0 | Status: SHIPPED | OUTPATIENT
Start: 2024-05-08 | End: 2024-08-06

## 2024-05-08 RX ORDER — DIPHENHYDRAMINE HCL 25 MG
4 CAPSULE ORAL
Qty: 50 EACH | Refills: 4 | Status: SHIPPED | OUTPATIENT
Start: 2024-05-08 | End: 2024-08-06

## 2024-05-08 RX ORDER — CLOTRIMAZOLE 1 %
CREAM (GRAM) TOPICAL 2 TIMES DAILY
Qty: 12 G | Refills: 1 | Status: SHIPPED | OUTPATIENT
Start: 2024-05-08 | End: 2024-05-22

## 2024-05-08 NOTE — PROGRESS NOTES
[facility] INTERNAL MEDICINE  OUTPATIENT OFFICE VISIT NOTE    SUBJECTIVE:      Chief Complaint: Follow-up (Pt here today with his grandfather. )       HPI: Chun Fischer is a 34 y.o. yo male w/ PMH of  has a past medical history of Deaf, epilepsy?  Schizoaffective disorder per chart review, chronic oral pain., who presents for 4 month(s) follow up.     Patient complaining of bumps on his cheeks/jaw.  Denies any itchiness.  Has changed shower gel without improvement.  Does state [facility] get a sh[facility] and noticed the rash becoming worse.  No fevers, or chills.  Rash only present on face.  No nausea vomiting.  Had ED visit on 04/17/2024 and prescribed Kenalog cream without relief.     Patient also expressing frustration regarding communicating with non deaf individuals.  Has significant issues getting his message across.  States he is always told he looks angry or mad, when he does not feel so.  He states he does try to get angry.  He is denying any ideas of SI/HI.  He is encouraged to seek therapy which he is agreeable to.  Following with Psychiatry.    Also endorsing daytime fatigue and taking multiple naps.  States he drinks 3 medium size coffees in the morning but denies any late caffeine consumption or any energy drinks.  States he sleeps from 11:00 p.m. to about 2:00 a.m..  Does not have a TV on or any distracting noises at night.  Grandfather endorses patient is sleeping.    Patient is scheduled for dental work with dentistry.     Follow up in 4 months.    Past Medical History:   has a past medical history of Deaf, Hypertension, Migraines, and Seizures.     Past Surgical History:   has a past surgical history that includes Inner ear surgery (Right); Implantation of cochlear prosthesis; and Abdominal surgery.     Family History:  family history includes Hypertension in his mother.     Social History:   reports that he has been smoking cigars. He has never used smokeless tobacco. He reports that he  "does not currently use alcohol. He reports that he does not use drugs.     Allergies:  has No Known Allergies.     Home Medications:  Current Outpatient Medications   Medication Instructions    ARIPiprazole (ABILIFY) 5 mg, Oral, Daily    nicotine (NICODERM CQ) 21 mg/24 hr 1 patch, Transdermal, Daily    nicotine polacrilex (NICORETTE) 4 mg, Oral, As needed (PRN)    triamcinolone (KENALOG) 0.5 % ointment Topical (Top), 2 times daily        ROS:  Review of Systems   Constitutional:  Negative for chills and fever.   HENT:  Negative for ear pain.    Respiratory:  Negative for cough and shortness of breath.    Cardiovascular:  Negative for chest pain.   Gastrointestinal:  Negative for abdominal pain, heartburn, nausea and vomiting.   Neurological:  Negative for seizures and headaches.   Psychiatric/Behavioral:  Negative for depression and suicidal ideas.           OBJECTIVE:     Vital signs:   /82 (BP Location: Right arm, Patient Position: Sitting, BP Method: Medium (Manual))   Pulse 69   Temp 97.5 °F (36.4 °C) (Oral)   Resp 20   Ht 5' 10" (1.778 m)   Wt 76.2 kg (167 lb 15.9 oz)   SpO2 100%   BMI 24.10 kg/m²      Physical Examination:  General:  Unkempt, no acute distress  Mouth:  Poor dentition throughout all teeth with multiple caries noted.   Neck: Full ROM; no lymphadenopathy  Pulm: CTA bilaterally, normal work of breathing  CV: S1, S2 w/o murmurs or gallops; no edema noted  GI: Soft with normal bowel sounds in all quadrants, no masses on palpation  MSK: Full ROM of all extremities and spine w/o limitation or discomfort  Derm:  Multiple small skin colored papules present on jaw and mid cheek without erythema; large papule noted with weeping  Neuro: AAOx4; CN II-XII intact; motor/sensory function intact      Labs:  CMP:   Recent Labs   Lab 05/30/21  1545 07/02/21  1610 08/12/21  1107 05/26/22  1722 06/21/23  2123 06/30/23  2011 10/07/23  0753 01/01/24  0029   Sodium 141 140 140   < > 139 136 141 141 "   Potassium 3.3 L 3.6 3.7   < > 3.4 L 3.6 3.6 4.1   Chloride 106 106 103   < > 106 106 106 106   CO2 20 L 26 27   < > 23 24 26 26   Blood Urea Nitrogen 3.4 L 3.5 L 5.1 L   < > 9.1 7.4 L 8.1 L 10.6   Creatinine 0.73 0.75 0.78   < > 0.76 0.80 0.84 0.82   Glucose 93 92 88   < > 85 85 69 L 72 L   Calcium 9.3 9.5 9.7   < > 9.1 9.2 9.4 8.9   Albumin 4.3 4.0 4.2   < > 4.0 4.1  --  4.0   Bilirubin Total 0.6 0.7 0.5   < > 0.6 0.9  --  0.4   Bilirubin Direct 0.2 0.3 0.2  --   --   --   --   --    Bilirubin Indirect 0.40 0.40 0.30  --   --   --   --   --    AST 57 H 22 36 H   < > 17 18  --  19   ALT 72 H 20 41   < > 10 13  --  11   ALP 97 91 88   < > 55 59  --  49    < > = values in this interval not displayed.     CBC:   Recent Labs   Lab 06/30/23  2011 10/07/23  0753 01/01/24  0029   WBC 4.80 3.81 L 5.58   Neut # 2.53 1.25 L 2.20   RBC 4.45 L 4.34 L 4.12 L   Hgb 13.6 L 13.1 L 12.7 L   Hct 39.1 L 38.9 L 37.9 L   Platelet 192 154 162   MCV 87.9 89.6 92.0   RDW 12.9 13.3 13.6     FLP:         DM:   Recent Labs   Lab 06/30/23  2011 10/07/23  0753 01/01/24  0029   Creatinine 0.80 0.84 0.82     Thyroid:   Recent Labs   Lab 05/30/21  1545 08/20/22  1704 10/07/23  0753   TSH 1.7177 0.9325 1.163     Anemia:   Recent Labs   Lab 01/01/24  0029   Hgb 12.7 L   Hct 37.9 L           ASSESSMENT & PLAN:        Facial rash  Tinea barbae?  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   - s/p tooth extraction w/ [facility] dentistry   - medical release form signed to obtain records  - ibuprofen p.r.n.    Tobacco use disorder  - 1 pack/day since 20 y/o (8 pack year)  - encouraged reducing tobacco use  - will Rx nicotine patches and gum    Health Maintenance  Vaccinations  Immunization History   Administered Date(s) Administered    DTP 1989, 1989, 1989, 11/29/1990    HIB 11/01/1990    Hepatitis A, Adult 03/28/2008, 09/30/2008    Influenza - Quadrivalent - PF *Preferred* (6 months and older) 01/17/2024    Influenza - Trivalent - PF (ADULT) 12/10/2020    MMR 08/10/1990    OPV 1989, 1989, 11/01/1990    Pneumococcal Conjugate - 13 Valent 07/13/2016    Td (ADULT) 01/17/2014    Td (Adult), Unspecified Formulation 02/28/2000    Td - PF (ADULT) 04/28/2015    Tdap 01/12/2014, 11/13/2014, 09/05/2016, 02/24/2017, 07/17/2021, 12/30/2021, 02/01/2022       -Flu:  Done 1/71/24     -Pneumonia: will address at next visit    Screening   -Lung Ca. Screening:  Not of age   -Colon Ca. Screening:  Not of age   -Hep C, HIV:  Ordered today  Social   -EtOH:  reports that he does not currently use alcohol.   -Tobacco:  reports that he has been smoking cigars. He has never used smokeless tobacco.   -Drugs:  reports no history of drug use.    -Depression:   Depression: Low Risk  (5/8/2024)    Depression     Last PHQ-4: Flowsheet Data: 2   Recent Concern: Depression - High Risk (3/11/2024)    Depression     Last PHQ-4: Flowsheet Data: 21          Return to clinic in 4 month(s).    Solo Robison DO  [facility] Internal Medicine PGY-2    Orders Placed This Encounter    Ambulatory referral/consult to Sleep Disorders    Ambulatory referral/consult to Behavioral Health    nicotine (NICODERM CQ) 21 mg/24 hr    nicotine polacrilex (NICORETTE) 4 MG Gum

## 2024-05-13 ENCOUNTER — HOSPITAL ENCOUNTER (EMERGENCY)
Facility: HOSPITAL | Age: 34
Discharge: HOME | End: 2024-05-13
Attending: EMERGENCY MEDICINE
Payer: COMMERCIAL

## 2024-05-13 VITALS
OXYGEN SATURATION: 99 % | TEMPERATURE: 99 F | WEIGHT: 240 LBS | SYSTOLIC BLOOD PRESSURE: 118 MMHG | RESPIRATION RATE: 18 BRPM | HEIGHT: 69 IN | BODY MASS INDEX: 35.55 KG/M2 | HEART RATE: 62 BPM | DIASTOLIC BLOOD PRESSURE: 86 MMHG

## 2024-05-13 DIAGNOSIS — E87.6 HYPOKALEMIA: Primary | ICD-10-CM

## 2024-05-13 LAB
ALBUMIN SERPL-MCNC: 3.8 G/DL (ref 3.5–5)
ALBUMIN/GLOB SERPL: 1.4 RATIO (ref 1.1–2)
ALP SERPL-CCNC: 49 UNIT/L (ref 40–150)
ALT SERPL-CCNC: 11 UNIT/L (ref 0–55)
AST SERPL-CCNC: 16 UNIT/L (ref 5–34)
BASOPHILS # BLD AUTO: 0.01 X10(3)/MCL
BASOPHILS NFR BLD AUTO: 0.2 %
BILIRUB SERPL-MCNC: 0.3 MG/DL
BUN SERPL-MCNC: 14.8 MG/DL (ref 8.9–20.6)
CALCIUM SERPL-MCNC: 9.1 MG/DL (ref 8.4–10.2)
CHLORIDE SERPL-SCNC: 107 MMOL/L (ref 98–107)
CO2 SERPL-SCNC: 28 MMOL/L (ref 22–29)
CREAT SERPL-MCNC: 0.87 MG/DL (ref 0.73–1.18)
EOSINOPHIL # BLD AUTO: 0.12 X10(3)/MCL (ref 0–0.9)
EOSINOPHIL NFR BLD AUTO: 2.4 %
ERYTHROCYTE [DISTWIDTH] IN BLOOD BY AUTOMATED COUNT: 12.7 % (ref 11.5–17)
FLUAV AG UPPER RESP QL IA.RAPID: NOT DETECTED
FLUBV AG UPPER RESP QL IA.RAPID: NOT DETECTED
GFR SERPLBLD CREATININE-BSD FMLA CKD-EPI: >60 ML/MIN/1.73/M2
GLOBULIN SER-MCNC: 2.8 GM/DL (ref 2.4–3.5)
GLUCOSE SERPL-MCNC: 108 MG/DL (ref 74–100)
HCT VFR BLD AUTO: 37.8 % (ref 42–52)
HGB BLD-MCNC: 13.1 G/DL (ref 14–18)
HOLD SPECIMEN: NORMAL
IMM GRANULOCYTES # BLD AUTO: 0 X10(3)/MCL (ref 0–0.04)
IMM GRANULOCYTES NFR BLD AUTO: 0 %
LIPASE SERPL-CCNC: 13 U/L
LYMPHOCYTES # BLD AUTO: 2.2 X10(3)/MCL (ref 0.6–4.6)
LYMPHOCYTES NFR BLD AUTO: 44.8 %
MAGNESIUM SERPL-MCNC: 2.1 MG/DL (ref 1.6–2.6)
MCH RBC QN AUTO: 31.2 PG (ref 27–31)
MCHC RBC AUTO-ENTMCNC: 34.7 G/DL (ref 33–36)
MCV RBC AUTO: 90 FL (ref 80–94)
MONOCYTES # BLD AUTO: 0.31 X10(3)/MCL (ref 0.1–1.3)
MONOCYTES NFR BLD AUTO: 6.3 %
NEUTROPHILS # BLD AUTO: 2.27 X10(3)/MCL (ref 2.1–9.2)
NEUTROPHILS NFR BLD AUTO: 46.3 %
NRBC BLD AUTO-RTO: 0 %
PLATELET # BLD AUTO: 166 X10(3)/MCL (ref 130–400)
PMV BLD AUTO: 11.2 FL (ref 7.4–10.4)
POTASSIUM SERPL-SCNC: 3.3 MMOL/L (ref 3.5–5.1)
PROT SERPL-MCNC: 6.6 GM/DL (ref 6.4–8.3)
RBC # BLD AUTO: 4.2 X10(6)/MCL (ref 4.7–6.1)
SARS-COV-2 RNA RESP QL NAA+PROBE: NOT DETECTED
SODIUM SERPL-SCNC: 141 MMOL/L (ref 136–145)
WBC # SPEC AUTO: 4.91 X10(3)/MCL (ref 4.5–11.5)

## 2024-05-13 PROCEDURE — 25000003 PHARM REV CODE 250: Performed by: EMERGENCY MEDICINE

## 2024-05-13 PROCEDURE — 80053 COMPREHEN METABOLIC PANEL: CPT | Performed by: EMERGENCY MEDICINE

## 2024-05-13 PROCEDURE — 0240U COVID/FLU A&B PCR: CPT | Performed by: EMERGENCY MEDICINE

## 2024-05-13 PROCEDURE — 83690 ASSAY OF LIPASE: CPT | Performed by: EMERGENCY MEDICINE

## 2024-05-13 PROCEDURE — 99284 EMERGENCY DEPT VISIT MOD MDM: CPT | Mod: 25

## 2024-05-13 PROCEDURE — 85025 COMPLETE CBC W/AUTO DIFF WBC: CPT | Performed by: EMERGENCY MEDICINE

## 2024-05-13 PROCEDURE — 63600175 PHARM REV CODE 636 W HCPCS: Performed by: EMERGENCY MEDICINE

## 2024-05-13 PROCEDURE — 96375 TX/PRO/DX INJ NEW DRUG ADDON: CPT

## 2024-05-13 PROCEDURE — 83735 ASSAY OF MAGNESIUM: CPT | Performed by: EMERGENCY MEDICINE

## 2024-05-13 PROCEDURE — 96361 HYDRATE IV INFUSION ADD-ON: CPT

## 2024-05-13 PROCEDURE — 96374 THER/PROPH/DIAG INJ IV PUSH: CPT

## 2024-05-13 RX ORDER — DIPHENHYDRAMINE HYDROCHLORIDE 50 MG/ML
25 INJECTION INTRAMUSCULAR; INTRAVENOUS
Status: COMPLETED | OUTPATIENT
Start: 2024-05-13 | End: 2024-05-13

## 2024-05-13 RX ORDER — KETOROLAC TROMETHAMINE 30 MG/ML
30 INJECTION, SOLUTION INTRAMUSCULAR; INTRAVENOUS
Status: COMPLETED | OUTPATIENT
Start: 2024-05-13 | End: 2024-05-13

## 2024-05-13 RX ADMIN — POTASSIUM BICARBONATE 50 MEQ: 977.5 TABLET, EFFERVESCENT ORAL at 03:05

## 2024-05-13 RX ADMIN — SODIUM CHLORIDE 1000 ML: 9 INJECTION, SOLUTION INTRAVENOUS at 02:05

## 2024-05-13 RX ADMIN — KETOROLAC TROMETHAMINE 30 MG: 30 INJECTION, SOLUTION INTRAMUSCULAR at 02:05

## 2024-05-13 RX ADMIN — DIPHENHYDRAMINE HYDROCHLORIDE 25 MG: 50 INJECTION INTRAMUSCULAR; INTRAVENOUS at 02:05

## 2024-05-13 NOTE — ED PROVIDER NOTES
"Encounter Date: 5/13/2024       History     Chief Complaint   Patient presents with    Weakness     Woke 30 minutes ago and "my eyes felt funny and I felt weak".     Feeling weak and tired at work for two days;         Review of patient's allergies indicates:  No Known Allergies  Past Medical History:   Diagnosis Date    Deaf     Hypertension     Migraines     Seizures      Past Surgical History:   Procedure Laterality Date    ABDOMINAL SURGERY      IMPLANTATION OF COCHLEAR PROSTHESIS      INNER EAR SURGERY Right      Family History   Problem Relation Name Age of Onset    Hypertension Mother       Social History     Tobacco Use    Smoking status: Every Day     Types: Cigars    Smokeless tobacco: Never   Substance Use Topics    Alcohol use: Not Currently    Drug use: Never     Review of Systems    Physical Exam     Initial Vitals [05/13/24 0113]   BP Pulse Resp Temp SpO2   124/86 91 17 98.1 °F (36.7 °C) 99 %      MAP       --         Physical Exam    Nursing note and vitals reviewed.  Constitutional: He appears well-developed and well-nourished. He is not diaphoretic. No distress.   HENT:   Head: Normocephalic and atraumatic.   Eyes: EOM are normal. Pupils are equal, round, and reactive to light. Right eye exhibits no discharge. Left eye exhibits no discharge.   Neck: Neck supple. No thyromegaly present. No tracheal deviation present. No JVD present.   Normal range of motion.  Cardiovascular:  Normal rate, regular rhythm, normal heart sounds and intact distal pulses.           No murmur heard.  Pulmonary/Chest: Breath sounds normal. No stridor. No respiratory distress. He has no wheezes. He has no rhonchi. He has no rales.   Abdominal: Abdomen is soft. He exhibits no distension. There is no abdominal tenderness. There is no rebound and no guarding.   Musculoskeletal:         General: No tenderness or edema. Normal range of motion.      Cervical back: Normal range of motion and neck supple.     Neurological: He is " alert and oriented to person, place, and time. He has normal strength. No cranial nerve deficit. GCS score is 15. GCS eye subscore is 4. GCS verbal subscore is 5. GCS motor subscore is 6.   Skin: Skin is warm and dry. Capillary refill takes less than 2 seconds. No rash and no abscess noted. No erythema. No pallor.   Psychiatric: He has a normal mood and affect. His behavior is normal. Judgment and thought content normal.         ED Course   Procedures  Labs Reviewed   COMPREHENSIVE METABOLIC PANEL - Abnormal; Notable for the following components:       Result Value    Potassium 3.3 (*)     Glucose 108 (*)     All other components within normal limits   CBC WITH DIFFERENTIAL - Abnormal; Notable for the following components:    RBC 4.20 (*)     Hgb 13.1 (*)     Hct 37.8 (*)     MCH 31.2 (*)     MPV 11.2 (*)     All other components within normal limits   MAGNESIUM - Normal   LIPASE - Normal   CBC W/ AUTO DIFFERENTIAL    Narrative:     The following orders were created for panel order CBC auto differential.  Procedure                               Abnormality         Status                     ---------                               -----------         ------                     CBC with Differential[[id number]]       Abnormal            Final result                 Please view results for these tests on the individual orders.   COVID/FLU A&B PCR   URINALYSIS, REFLEX TO URINE CULTURE   EXTRA TUBES    Narrative:     The following orders were created for panel order EXTRA TUBES.  Procedure                               Abnormality         Status                     ---------                               -----------         ------                     Light Blue Top Hold[[id number]]                             In process                 Lavender Top Hold[[id number]]                               In process                 Gold Top Hold[[id number]]                                   In process                   Please view  results for these tests on the individual orders.   LIGHT BLUE TOP HOLD   LAVENDER TOP HOLD   GOLD TOP HOLD          Imaging Results    None          Medications   sodium chloride 0.9% bolus 1,000 mL 1,000 mL (1,000 mLs Intravenous New Bag 5/13/24 0254)   ketorolac injection 30 mg (30 mg Intravenous Given 5/13/24 0240)   diphenhydrAMINE injection 25 mg (25 mg Intravenous Given 5/13/24 0240)   potassium bicarbonate disintegrating tablet 50 mEq (50 mEq Oral Given 5/13/24 0342)     Medical Decision Making  Amount and/or Complexity of Data Reviewed  Labs: ordered. Decision-making details documented in ED Course.     Details: Mild hypokalemia on chemistries;    Risk  Prescription drug management.  Risk Details: Risk found sufficient to obtain evaluation with lab data; labs demonstrate mild hypokalemia but are otherwise found generally reassuring.  We have repleted the potassium.  Patient is discharged with anticipatory guidance, return precautions, follow-up instructions.               ED Course as of 05/13/24 0348   Mon May 13, 2024   0308 Reassuring hemogram; [CT]   0331 Negative lipase; [CT]   0331 Generally reassuring chemistries, mild hypokalemia, normal magnesium; [CT]      ED Course User Index  [CT] Mc Juan MD                           Clinical Impression:  Final diagnoses:  [E87.6] Hypokalemia (Primary)          ED Disposition Condition    Discharge Stable          ED Prescriptions    None       Follow-up Information       Follow up With Specialties Details Why Contact Info    [facility] Emergency Medicine  As needed, If symptoms worsen [address], [place] [address] [phone]    Solo Robison, DO Internal Medicine Call   [address] [facility] [address]  [phone]               Mc Juan MD  05/13/24 1351

## 2024-06-27 DIAGNOSIS — G47.33 OSA (OBSTRUCTIVE SLEEP APNEA): Primary | ICD-10-CM

## 2024-08-26 NOTE — PROGRESS NOTES
[facility] Neurology Initial Office Visit Note    Initial Visit Date: 8/27/2024  Current Visit Date:  08/27/2024    Chief Complaint:     Chief Complaint   Patient presents with    Seizures     Patient states the last seizure was a year ago.        History of Present Illness:      Sign language interpretor on site.     This is 34 y.o. right hand dominant male with history of bipolar disorder, ALEA who is referred for seizure disorder.    Age of Onset : Childhood     Semiology: behavioral arrest followed by GTC of unknown duration with post ictal confusion.     Frequency: last event 6/21/2023    Provocation Factors: overheating, stress      Risk Factors  - Family history of seizure disorders:  No  - History of focal CNS lesions: No  - History of CNS infections: No  - History head trauma with prolonged LOC: Unknown  - History of childhood seizures, including febrile seizures: Yes as above   - History of development delay: Unknown   - History of underlying mood disorder: Yes bipolar disorder    - History of sleep disorder: Yes ALEA  - Recreational drug use: No  - Alcohol use: No  - Family planning and contraceptive use: Not Applicable     Medications:     Current Anti-Seizure Medication(s):  None     Prior Anti-Seizure Medication(s):  Depakote 1000 mg at bedtime: not taking.    Surgical Intervention & Devices:     - VNS:  - DBS  - RNS:  - Lobectomy:    Labs:     Results for orders placed or performed during the hospital encounter of 05/13/24   COVID/FLU A&B PCR   Result Value Ref Range    Influenza A PCR Not Detected Not Detected    Influenza B PCR Not Detected Not Detected    SARS-CoV-2 PCR Not Detected Not Detected, Negative   Comprehensive metabolic panel   Result Value Ref Range    Sodium 141 136 - 145 mmol/L    Potassium 3.3 (L) 3.5 - 5.1 mmol/L    Chloride 107 98 - 107 mmol/L    CO2 28 22 - 29 mmol/L    Glucose 108 (H) 74 - 100 mg/dL    Blood Urea Nitrogen 14.8 8.9 - 20.6 mg/dL    Creatinine 0.87 0.73 - 1.18 mg/dL     Calcium 9.1 8.4 - 10.2 mg/dL    Protein Total 6.6 6.4 - 8.3 gm/dL    Albumin 3.8 3.5 - 5.0 g/dL    Globulin 2.8 2.4 - 3.5 gm/dL    Albumin/Globulin Ratio 1.4 1.1 - 2.0 ratio    Bilirubin Total 0.3 <=1.5 mg/dL    ALP 49 40 - 150 unit/L    ALT 11 0 - 55 unit/L    AST 16 5 - 34 unit/L    eGFR >60 mL/min/1.73/m2   Magnesium   Result Value Ref Range    Magnesium Level 2.10 1.60 - 2.60 mg/dL   Lipase   Result Value Ref Range    Lipase Level 13 <=60 U/L   CBC with Differential   Result Value Ref Range    WBC 4.91 4.50 - 11.50 x10(3)/mcL    RBC 4.20 (L) 4.70 - 6.10 x10(6)/mcL    Hgb 13.1 (L) 14.0 - 18.0 g/dL    Hct 37.8 (L) 42.0 - 52.0 %    MCV 90.0 80.0 - 94.0 fL    MCH 31.2 (H) 27.0 - 31.0 pg    MCHC 34.7 33.0 - 36.0 g/dL    RDW 12.7 11.5 - 17.0 %    Platelet 166 130 - 400 x10(3)/mcL    MPV 11.2 (H) 7.4 - 10.4 fL    Neut % 46.3 %    Lymph % 44.8 %    Mono % 6.3 %    Eos % 2.4 %    Basophil % 0.2 %    Lymph # 2.20 0.6 - 4.6 x10(3)/mcL    Neut # 2.27 2.1 - 9.2 x10(3)/mcL    Mono # 0.31 0.1 - 1.3 x10(3)/mcL    Eos # 0.12 0 - 0.9 x10(3)/mcL    Baso # 0.01 <=0.2 x10(3)/mcL    IG# 0.00 0 - 0.04 x10(3)/mcL    IG% 0.0 %    NRBC% 0.0 %   Light Blue Top Hold   Result Value Ref Range    Extra Tube Hold for add-ons.    Lavender Top Hold   Result Value Ref Range    Extra Tube Hold for add-ons.    Gold Top Hold   Result Value Ref Range    Extra Tube Hold for add-ons.        Studies:      - routine EEG 1/31/2024: This is an abnormal routine awake and asleep EEG with right posterior temporal epileptiform discharge.   - one hour EEG:   - 24 hour EEG:  - Ambulatory EEG:  - EMU Video EEG:  - MRI Brain:   - NCHCT:  - WADA:    Review of Systems:     Review of Systems   All other systems reviewed and are negative.      Physical Exams:     Vitals:    08/27/24 1117   BP: 127/87   Pulse: 70   Resp: 18   Temp: 97.5 °F (36.4 °C)       Physical Exam  Vitals and nursing note reviewed.   Constitutional:       Appearance: Normal appearance.   HENT:       Head: Normocephalic and atraumatic.      Nose: Nose normal.      Mouth/Throat:      Mouth: Mucous membranes are moist.      Pharynx: Oropharynx is clear.   Eyes:      Conjunctiva/sclera: Conjunctivae normal.   Cardiovascular:      Rate and Rhythm: Normal rate and regular rhythm.      Pulses: Normal pulses.      Heart sounds: Normal heart sounds.   Pulmonary:      Effort: Pulmonary effort is normal.      Breath sounds: Normal breath sounds.   Abdominal:      General: Abdomen is flat.      Palpations: Abdomen is soft.   Musculoskeletal:         General: Normal range of motion.      Cervical back: Normal range of motion.   Neurological:      Mental Status: He is alert.   Psychiatric:         Mood and Affect: Mood normal.         Comprehensive Neurological Exam:  Mental Status: Alert Oriented to Self, Date, and Place.  Comprehension wnl. N  CN II - XII: NICOLE, No APD, VFFC, No ptosis OU, EOMI without nystagmus, LT/Temp symmetric in CN V1-3 distribution, Hearing impaired, Face Symmetric, Tongue and Uvula midline, Trapezius symmetric bilateral.   Motor: tone and bulk wnl throughout, no abnormal involuntary or voluntary movements, 5/5 to confrontation, Fine finger movements wnl b/l, No pronator drift.   Sensory: LT, Proprioception, Vibration, PP, Temp symmetric.   Reflexes: 2+ throughout  Cerebellar: FNF wnl b/l, RAHM wnl b/l  Gait: normal.     Assessment:     This is 34 y.o. right hand dominant male with history of bipolar disorder, ALEA who is referred for right temporal focal epilepsy.     Problem List Items Addressed This Visit          Neuro    Localization-related (focal) (partial) idiopathic epilepsy and epileptic syndromes with seizures of localized onset, not intractable, without status epilepticus - Primary       Plan:     [] start Depakote 500 mg at bedtime   [] CBC,CMP in 2 months     RTC 4 Months      Visit today is associated with current or anticipated ongoing medical care related to this patient's single  serious condition/complex condition as documented above.     Seizure education provided: including family planning and teratogenicity of AEDs, risk of uncontrolled seizure disorder, SUDEP. No driving until seizure free for six months ([place] state law). No swimming, climbing heights, or operate heavy machinery without supervision. Patient is advised to avoid Benadryl, Tramadol, Wellbutrin, flashing lights, alcohol, sleep deprivation, and certain anti-biotics that can lower seizure threshold.     I have explained the treatment plan, diagnosis, and prognosis to patient. All questions are answered to the best of my knowledge.     Face to face time 45 minutes, including documentation, chart review, counseling, education, review of test results, relevant medical records, and coordination of care.   I have explained the treatment plan, diagnosis, and prognosis to patient. All questions are answered to the best of my knowledge.         Carmen Wynne MD   General Neurology  08/26/2024

## 2024-08-27 ENCOUNTER — OFFICE VISIT (OUTPATIENT)
Dept: NEUROLOGY | Facility: CLINIC | Age: 34
End: 2024-08-27
Payer: COMMERCIAL

## 2024-08-27 VITALS
HEART RATE: 70 BPM | BODY MASS INDEX: 26.91 KG/M2 | RESPIRATION RATE: 18 BRPM | OXYGEN SATURATION: 99 % | WEIGHT: 181.69 LBS | DIASTOLIC BLOOD PRESSURE: 87 MMHG | HEIGHT: 69 IN | SYSTOLIC BLOOD PRESSURE: 127 MMHG | TEMPERATURE: 98 F

## 2024-08-27 DIAGNOSIS — H91.93 BILATERAL DEAFNESS: ICD-10-CM

## 2024-08-27 DIAGNOSIS — G40.009 LOCALIZATION-RELATED (FOCAL) (PARTIAL) IDIOPATHIC EPILEPSY AND EPILEPTIC SYNDROMES WITH SEIZURES OF LOCALIZED ONSET, NOT INTRACTABLE, WITHOUT STATUS EPILEPTICUS: Primary | ICD-10-CM

## 2024-08-27 PROCEDURE — 99213 OFFICE O/P EST LOW 20 MIN: CPT | Mod: PBBFAC | Performed by: PSYCHIATRY & NEUROLOGY

## 2024-08-27 RX ORDER — DIVALPROEX SODIUM 500 MG/1
500 TABLET, FILM COATED, EXTENDED RELEASE ORAL NIGHTLY
Qty: 30 TABLET | Refills: 3 | Status: SHIPPED | OUTPATIENT
Start: 2024-08-27 | End: 2024-12-25

## 2024-09-02 ENCOUNTER — HOSPITAL ENCOUNTER (EMERGENCY)
Facility: HOSPITAL | Age: 34
Discharge: HOME | End: 2024-09-02
Attending: EMERGENCY MEDICINE
Payer: COMMERCIAL

## 2024-09-02 VITALS
HEART RATE: 53 BPM | HEIGHT: 69 IN | OXYGEN SATURATION: 100 % | SYSTOLIC BLOOD PRESSURE: 107 MMHG | DIASTOLIC BLOOD PRESSURE: 78 MMHG | BODY MASS INDEX: 32.58 KG/M2 | RESPIRATION RATE: 19 BRPM | TEMPERATURE: 97 F | WEIGHT: 220 LBS

## 2024-09-02 DIAGNOSIS — G40.009 LOCALIZATION-RELATED (FOCAL) (PARTIAL) IDIOPATHIC EPILEPSY AND EPILEPTIC SYNDROMES WITH SEIZURES OF LOCALIZED ONSET, NOT INTRACTABLE, WITHOUT STATUS EPILEPTICUS: Primary | ICD-10-CM

## 2024-09-02 LAB
ALBUMIN SERPL-MCNC: 3.9 G/DL (ref 3.5–5)
ALBUMIN/GLOB SERPL: 1.6 RATIO (ref 1.1–2)
ALP SERPL-CCNC: 48 UNIT/L (ref 40–150)
ALT SERPL-CCNC: 32 UNIT/L (ref 0–55)
AMPHET UR QL SCN: NEGATIVE
ANION GAP SERPL CALC-SCNC: 8 MEQ/L
AST SERPL-CCNC: 32 UNIT/L (ref 5–34)
BARBITURATE SCN PRESENT UR: NEGATIVE
BASOPHILS # BLD AUTO: 0.01 X10(3)/MCL
BASOPHILS NFR BLD AUTO: 0.3 %
BENZODIAZ UR QL SCN: NEGATIVE
BILIRUB SERPL-MCNC: 0.5 MG/DL
BUN SERPL-MCNC: 5.1 MG/DL (ref 8.9–20.6)
CALCIUM SERPL-MCNC: 9 MG/DL (ref 8.4–10.2)
CANNABINOIDS UR QL SCN: NEGATIVE
CHLORIDE SERPL-SCNC: 109 MMOL/L (ref 98–107)
CO2 SERPL-SCNC: 26 MMOL/L (ref 22–29)
COCAINE UR QL SCN: NEGATIVE
CREAT SERPL-MCNC: 0.78 MG/DL (ref 0.73–1.18)
CREAT/UREA NIT SERPL: 7
EOSINOPHIL # BLD AUTO: 0.05 X10(3)/MCL (ref 0–0.9)
EOSINOPHIL NFR BLD AUTO: 1.3 %
ERYTHROCYTE [DISTWIDTH] IN BLOOD BY AUTOMATED COUNT: 14.1 % (ref 11.5–17)
ETHANOL SERPL-MCNC: <10 MG/DL
FENTANYL UR QL SCN: NEGATIVE
GFR SERPLBLD CREATININE-BSD FMLA CKD-EPI: >60 ML/MIN/1.73/M2
GLOBULIN SER-MCNC: 2.4 GM/DL (ref 2.4–3.5)
GLUCOSE SERPL-MCNC: 74 MG/DL (ref 74–100)
HCT VFR BLD AUTO: 35.7 % (ref 42–52)
HGB BLD-MCNC: 12.6 G/DL (ref 14–18)
HOLD SPECIMEN: NORMAL
IMM GRANULOCYTES # BLD AUTO: 0.01 X10(3)/MCL (ref 0–0.04)
IMM GRANULOCYTES NFR BLD AUTO: 0.3 %
LYMPHOCYTES # BLD AUTO: 2.18 X10(3)/MCL (ref 0.6–4.6)
LYMPHOCYTES NFR BLD AUTO: 57.8 %
MCH RBC QN AUTO: 31.5 PG (ref 27–31)
MCHC RBC AUTO-ENTMCNC: 35.3 G/DL (ref 33–36)
MCV RBC AUTO: 89.3 FL (ref 80–94)
MDMA UR QL SCN: NEGATIVE
MONOCYTES # BLD AUTO: 0.22 X10(3)/MCL (ref 0.1–1.3)
MONOCYTES NFR BLD AUTO: 5.8 %
NEUTROPHILS # BLD AUTO: 1.3 X10(3)/MCL (ref 2.1–9.2)
NEUTROPHILS NFR BLD AUTO: 34.5 %
NRBC BLD AUTO-RTO: 0 %
OPIATES UR QL SCN: NEGATIVE
PCP UR QL: NEGATIVE
PH UR: 6.5 [PH] (ref 3–11)
PLATELET # BLD AUTO: 129 X10(3)/MCL (ref 130–400)
PLATELETS.RETICULATED NFR BLD AUTO: 7.2 % (ref 0.9–11.2)
PMV BLD AUTO: 11.5 FL (ref 7.4–10.4)
POTASSIUM SERPL-SCNC: 3.2 MMOL/L (ref 3.5–5.1)
PROT SERPL-MCNC: 6.3 GM/DL (ref 6.4–8.3)
RBC # BLD AUTO: 4 X10(6)/MCL (ref 4.7–6.1)
SODIUM SERPL-SCNC: 143 MMOL/L (ref 136–145)
SPECIFIC GRAVITY, URINE AUTO (.000) (OHS): 1 (ref 1–1.03)
VALPROATE SERPL-MCNC: <12.5 UG/ML (ref 50–100)
WBC # BLD AUTO: 3.77 X10(3)/MCL (ref 4.5–11.5)

## 2024-09-02 PROCEDURE — 25000003 PHARM REV CODE 250: Performed by: NURSE PRACTITIONER

## 2024-09-02 PROCEDURE — 80307 DRUG TEST PRSMV CHEM ANLYZR: CPT | Performed by: NURSE PRACTITIONER

## 2024-09-02 PROCEDURE — 82077 ASSAY SPEC XCP UR&BREATH IA: CPT | Performed by: NURSE PRACTITIONER

## 2024-09-02 PROCEDURE — 99284 EMERGENCY DEPT VISIT MOD MDM: CPT | Mod: 25

## 2024-09-02 PROCEDURE — 63600175 PHARM REV CODE 636 W HCPCS: Performed by: NURSE PRACTITIONER

## 2024-09-02 PROCEDURE — 80053 COMPREHEN METABOLIC PANEL: CPT | Performed by: NURSE PRACTITIONER

## 2024-09-02 PROCEDURE — 85025 COMPLETE CBC W/AUTO DIFF WBC: CPT | Performed by: NURSE PRACTITIONER

## 2024-09-02 PROCEDURE — 96375 TX/PRO/DX INJ NEW DRUG ADDON: CPT

## 2024-09-02 PROCEDURE — 96365 THER/PROPH/DIAG IV INF INIT: CPT

## 2024-09-02 PROCEDURE — 80164 ASSAY DIPROPYLACETIC ACD TOT: CPT | Performed by: NURSE PRACTITIONER

## 2024-09-02 RX ORDER — LORAZEPAM 2 MG/ML
2 INJECTION INTRAMUSCULAR
Status: COMPLETED | OUTPATIENT
Start: 2024-09-02 | End: 2024-09-02

## 2024-09-02 RX ORDER — ACETAMINOPHEN 500 MG
1000 TABLET ORAL
Status: DISCONTINUED | OUTPATIENT
Start: 2024-09-02 | End: 2024-09-02

## 2024-09-02 RX ADMIN — DEXTROSE MONOHYDRATE 1000 MG: 50 INJECTION, SOLUTION INTRAVENOUS at 07:09

## 2024-09-02 RX ADMIN — LORAZEPAM 2 MG: 2 INJECTION INTRAMUSCULAR; INTRAVENOUS at 07:09

## 2024-09-03 NOTE — ED PROVIDER NOTES
Encounter Date: 9/2/2024       History     Chief Complaint   Patient presents with    Seizures     Seizure while at work today, GCS 15 upon arrival to ER with EMS. Patient states has a history of seizures. Has not had a seizure in a year so he stopped taking meds     34 year old with a history of seizures had a seizure while at work PTA. He is noncompliant with medication. He has not taken medication in over a year. He saw Dr. Wong on 8/27/24 and was started on depakote 500 mg at bedtime. He has right temporal focal epilepsy.     The history is provided by the patient. No  was used.     Review of patient's allergies indicates:  No Known Allergies  Past Medical History:   Diagnosis Date    Deaf     Hypertension     Migraines     Seizures      Past Surgical History:   Procedure Laterality Date    ABDOMINAL SURGERY      IMPLANTATION OF COCHLEAR PROSTHESIS      INNER EAR SURGERY Right      Family History   Problem Relation Name Age of Onset    Hypertension Mother       Social History     Tobacco Use    Smoking status: Every Day     Types: Cigars    Smokeless tobacco: Never   Substance Use Topics    Alcohol use: Not Currently    Drug use: Never     Review of Systems   Constitutional:  Negative for fever.   HENT:  Negative for sore throat.    Respiratory:  Negative for shortness of breath.    Cardiovascular:  Negative for chest pain.   Gastrointestinal:  Negative for nausea.   Genitourinary:  Negative for dysuria.   Musculoskeletal:  Negative for back pain.   Skin:  Negative for rash.   Neurological:  Positive for seizures. Negative for weakness.   Hematological:  Does not bruise/bleed easily.       Physical Exam     Initial Vitals [09/02/24 1910]   BP Pulse Resp Temp SpO2   (!) 133/94 (!) 55 18 96.8 °F (36 °C) 100 %      MAP       --         Physical Exam    Nursing note and vitals reviewed.  Constitutional: He appears well-developed and well-nourished. No distress.   HENT:   Head: Normocephalic and  atraumatic.   Eyes: Pupils are equal, round, and reactive to light.   Neck: Neck supple.   Normal range of motion.  Cardiovascular:  Normal rate, regular rhythm, normal heart sounds and intact distal pulses.           Pulmonary/Chest: Breath sounds normal.   Abdominal: Abdomen is soft. Bowel sounds are normal.   Musculoskeletal:         General: Normal range of motion.      Cervical back: Normal range of motion and neck supple.     Neurological: He is alert and oriented to person, place, and time. He has normal strength and normal reflexes. A sensory deficit is present. No cranial nerve deficit. GCS score is 15. GCS eye subscore is 4. GCS verbal subscore is 5. GCS motor subscore is 6.   Right facial parasthesia   Skin: Skin is warm and dry.   Psychiatric: Thought content normal.         ED Course   Procedures  Labs Reviewed   COMPREHENSIVE METABOLIC PANEL - Abnormal       Result Value    Sodium 143      Potassium 3.2 (*)     Chloride 109 (*)     CO2 26      Glucose 74      Blood Urea Nitrogen 5.1 (*)     Creatinine 0.78      Calcium 9.0      Protein Total 6.3 (*)     Albumin 3.9      Globulin 2.4      Albumin/Globulin Ratio 1.6      Bilirubin Total 0.5      ALP 48      ALT 32      AST 32      eGFR >60      Anion Gap 8.0      BUN/Creatinine Ratio 7     VALPROIC ACID - Abnormal    Valproic Acid <12.5 (*)    CBC WITH DIFFERENTIAL - Abnormal    WBC 3.77 (*)     RBC 4.00 (*)     Hgb 12.6 (*)     Hct 35.7 (*)     MCV 89.3      MCH 31.5 (*)     MCHC 35.3      RDW 14.1      Platelet 129 (*)     MPV 11.5 (*)     IPF 7.2      Neut % 34.5      Lymph % 57.8      Mono % 5.8      Eos % 1.3      Basophil % 0.3      Lymph # 2.18      Neut # 1.30 (*)     Mono # 0.22      Eos # 0.05      Baso # 0.01      IG# 0.01      IG% 0.3      NRBC% 0.0     ALCOHOL,MEDICAL (ETHANOL) - Normal    Ethanol Level <10.0     DRUG SCREEN, URINE (BEAKER) - Normal    Amphetamines, Urine Negative      Barbiturates, Urine Negative      Benzodiazepine, Urine  Negative      Cannabinoids, Urine Negative      Cocaine, Urine Negative      Fentanyl, Urine Negative      MDMA, Urine Negative      Opiates, Urine Negative      Phencyclidine, Urine Negative      pH, Urine 6.5      Specific Gravity, Urine Auto 1.005      Narrative:     Cut off concentrations:    Amphetamines - 1000 ng/ml  Barbiturates - 200 ng/ml  Benzodiazepine - 200 ng/ml  Cannabinoids (THC) - 50 ng/ml  Cocaine - 300 ng/ml  Fentanyl - 1.0 ng/ml  MDMA - 500 ng/ml  Opiates - 300 ng/ml   Phencyclidine (PCP) - 25 ng/ml    Specimen submitted for drug analysis and tested for pH and specific gravity in order to evaluate sample integrity. Suspect tampering if specific gravity is <1.003 and/or pH is not within the range of 4.5 - 8.0  False negatives may result form substances such as bleach added to urine.  False positives may result for the presence of a substance with similar chemical structure to the drug or its metabolite.    This test provides only a PRELIMINARY analytical test result. A more specific alternate chemical method must be used in order to obtain a confirmed analytical result. Gas chromatography/mass spectrometry (GC/MS) is the preferred confirmatory method. Other chemical confirmation methods are available. Clinical consideration and professional judgement should be applied to any drug of abuse test result, particularly when preliminary positive results are used.    Positive results will be confirmed only at the physicians request. Unconfirmed screening results are to be used only for medical purposes (treatment).        CBC W/ AUTO DIFFERENTIAL    Narrative:     The following orders were created for panel order CBC auto differential.  Procedure                               Abnormality         Status                     ---------                               -----------         ------                     CBC with Differential[[id number]]       Abnormal            Final result                 Please  view results for these tests on the individual orders.   EXTRA TUBES    Narrative:     The following orders were created for panel order EXTRA TUBES.  Procedure                               Abnormality         Status                     ---------                               -----------         ------                     Light Blue Top Hold[[id number]]                             Final result               Red Top Hold[[id number]]                                    Final result               Light Green Top Hold[[id number]]                            Final result               Lavender Top Hold[[id number]]                               Final result               Gold Top Hold[[id number]]                                   Final result                 Please view results for these tests on the individual orders.   LIGHT BLUE TOP HOLD    Extra Tube Hold for add-ons.     RED TOP HOLD    Extra Tube Hold for add-ons.     LIGHT GREEN TOP HOLD    Extra Tube Hold for add-ons.     LAVENDER TOP HOLD    Extra Tube Hold for add-ons.     GOLD TOP HOLD    Extra Tube Hold for add-ons.            Imaging Results    None          Medications   LORazepam injection 2 mg (2 mg Intravenous Given 9/2/24 1937)   valproate (DEPACON) 1,000 mg in D5W 100 mL IVPB (0 mg Intravenous Stopped 9/2/24 2051)     Medical Decision Making  34 year old with a history of seizures had a seizure while at work PTA. He is noncompliant with medication. He has not taken medication in over a year. He saw Dr. Wong on 8/27/24 and was started on depakote 500 mg at bedtime. He has right temporal focal epilepsy.     IV started, ativan given, loading dose of depakote given.   Labs reassuring.     No seizure while in ER. Discharged home with mother. Follow up with Dr. Wong, Driving precautions, seizure precautions and ER Precautions given.    Amount and/or Complexity of Data Reviewed  Labs: ordered. Decision-making details documented in ED Course.    Risk  Prescription  drug management.  Risk Details: Risk Details: Given strict ED return precautions. I have spoken with the patient and/or caregivers. I have explained the patient's condition, diagnoses and treatment plan based on the information available to me at this time. I have answered the patient's and/or caregiver's questions and addressed any concerns. The patient and/or caregivers have as good an understanding of the patient's diagnosis, condition and treatment plan as can be expected at this point. The vital signs have been stable. The patient's condition is stable and appropriate for discharge from the emergency department.      The patient will pursue further outpatient evaluation with the primary care physician or other designated or consulting physician as outlined in the discharge instructions. The patient and/or caregivers are agreeable to this plan of care and follow-up instructions have been explained in detail. The patient and/or caregivers have received these instructions in written format and have expressed an understanding of the discharge instructions. The patient and/or caregivers are aware that any significant change in condition or worsening of symptoms should prompt an immediate return to this or the closest emergency department or a call to 911.           Additional MDM:   Differential Diagnosis:   Tremors, drug induced seizure, overdose, malignancy, etc               ED Course as of 09/02/24 2125   Mon Sep 02, 2024   2121 No seizure activity while in ER will send home with patient's mother.  [LS]      ED Course User Index  [LS] Tata Larson, ADRIÁN             It is important that you follow up with your primary care provider or specialist if indicated for further evaluation, workup, and treatment as necessary. The exam and treatment you received in Emergency Department was for an urgent problem and NOT INTENDED AS COMPLETE CARE. It is important that you FOLLOW UP with a doctor for ongoing care. If  your symptoms become WORSE or you DO NOT IMPROVE and you are unable to reach your health care provider, you should RETURN to the Emergency Department                Clinical Impression:  Final diagnoses:  [G40.009] Localization-related (focal) (partial) idiopathic epilepsy and epileptic syndromes with seizures of localized onset, not intractable, without status epilepticus (Primary)          ED Disposition Condition    Discharge Stable          ED Prescriptions    None       Follow-up Information       Follow up With Specialties Details Why Contact Info    Carmen Wynne MD Neurology, Psychiatry Schedule an appointment as soon as possible for a visit   [address] [facility] [address]  [phone]               Tata Larson, [facility]  09/02/24 4996

## 2024-09-18 ENCOUNTER — HOSPITAL ENCOUNTER (EMERGENCY)
Facility: HOSPITAL | Age: 34
Discharge: HOME | End: 2024-09-18
Attending: INTERNAL MEDICINE
Payer: COMMERCIAL

## 2024-09-18 VITALS
SYSTOLIC BLOOD PRESSURE: 132 MMHG | WEIGHT: 220 LBS | BODY MASS INDEX: 32.58 KG/M2 | RESPIRATION RATE: 17 BRPM | HEIGHT: 69 IN | TEMPERATURE: 98 F | DIASTOLIC BLOOD PRESSURE: 94 MMHG | HEART RATE: 54 BPM | OXYGEN SATURATION: 99 %

## 2024-09-18 DIAGNOSIS — K29.00 ACUTE GASTRITIS WITHOUT HEMORRHAGE, UNSPECIFIED GASTRITIS TYPE: Primary | ICD-10-CM

## 2024-09-18 LAB
ALBUMIN SERPL-MCNC: 4 G/DL (ref 3.5–5)
ALBUMIN/GLOB SERPL: 1.5 RATIO (ref 1.1–2)
ALP SERPL-CCNC: 55 UNIT/L (ref 40–150)
ALT SERPL-CCNC: 18 UNIT/L (ref 0–55)
ANION GAP SERPL CALC-SCNC: 8 MEQ/L
AST SERPL-CCNC: 19 UNIT/L (ref 5–34)
BASOPHILS # BLD AUTO: 0.01 X10(3)/MCL
BASOPHILS NFR BLD AUTO: 0.3 %
BILIRUB SERPL-MCNC: 0.5 MG/DL
BUN SERPL-MCNC: 7 MG/DL (ref 8.9–20.6)
CALCIUM SERPL-MCNC: 9 MG/DL (ref 8.4–10.2)
CHLORIDE SERPL-SCNC: 103 MMOL/L (ref 98–107)
CO2 SERPL-SCNC: 30 MMOL/L (ref 22–29)
CREAT SERPL-MCNC: 0.88 MG/DL (ref 0.73–1.18)
CREAT/UREA NIT SERPL: 8
EOSINOPHIL # BLD AUTO: 0.04 X10(3)/MCL (ref 0–0.9)
EOSINOPHIL NFR BLD AUTO: 1.3 %
ERYTHROCYTE [DISTWIDTH] IN BLOOD BY AUTOMATED COUNT: 13.5 % (ref 11.5–17)
GFR SERPLBLD CREATININE-BSD FMLA CKD-EPI: >60 ML/MIN/1.73/M2
GLOBULIN SER-MCNC: 2.7 GM/DL (ref 2.4–3.5)
GLUCOSE SERPL-MCNC: 82 MG/DL (ref 74–100)
HCT VFR BLD AUTO: 38 % (ref 42–52)
HGB BLD-MCNC: 13.1 G/DL (ref 14–18)
IMM GRANULOCYTES # BLD AUTO: 0 X10(3)/MCL (ref 0–0.04)
IMM GRANULOCYTES NFR BLD AUTO: 0 %
LIPASE SERPL-CCNC: 7 U/L
LYMPHOCYTES # BLD AUTO: 1.51 X10(3)/MCL (ref 0.6–4.6)
LYMPHOCYTES NFR BLD AUTO: 48.6 %
MCH RBC QN AUTO: 31 PG (ref 27–31)
MCHC RBC AUTO-ENTMCNC: 34.5 G/DL (ref 33–36)
MCV RBC AUTO: 90 FL (ref 80–94)
MONOCYTES # BLD AUTO: 0.29 X10(3)/MCL (ref 0.1–1.3)
MONOCYTES NFR BLD AUTO: 9.3 %
NEUTROPHILS # BLD AUTO: 1.26 X10(3)/MCL (ref 2.1–9.2)
NEUTROPHILS NFR BLD AUTO: 40.5 %
NRBC BLD AUTO-RTO: 0 %
PLATELET # BLD AUTO: 143 X10(3)/MCL (ref 130–400)
PLATELETS.RETICULATED NFR BLD AUTO: 8.3 % (ref 0.9–11.2)
PMV BLD AUTO: 11 FL (ref 7.4–10.4)
POTASSIUM SERPL-SCNC: 3.2 MMOL/L (ref 3.5–5.1)
PROT SERPL-MCNC: 6.7 GM/DL (ref 6.4–8.3)
RBC # BLD AUTO: 4.22 X10(6)/MCL (ref 4.7–6.1)
SODIUM SERPL-SCNC: 141 MMOL/L (ref 136–145)
WBC # BLD AUTO: 3.11 X10(3)/MCL (ref 4.5–11.5)

## 2024-09-18 PROCEDURE — 96361 HYDRATE IV INFUSION ADD-ON: CPT

## 2024-09-18 PROCEDURE — 96374 THER/PROPH/DIAG INJ IV PUSH: CPT

## 2024-09-18 PROCEDURE — 25000003 PHARM REV CODE 250: Performed by: INTERNAL MEDICINE

## 2024-09-18 PROCEDURE — 99284 EMERGENCY DEPT VISIT MOD MDM: CPT | Mod: 25

## 2024-09-18 PROCEDURE — 96375 TX/PRO/DX INJ NEW DRUG ADDON: CPT

## 2024-09-18 PROCEDURE — 63600175 PHARM REV CODE 636 W HCPCS: Performed by: INTERNAL MEDICINE

## 2024-09-18 PROCEDURE — 85025 COMPLETE CBC W/AUTO DIFF WBC: CPT | Performed by: INTERNAL MEDICINE

## 2024-09-18 PROCEDURE — 83690 ASSAY OF LIPASE: CPT | Performed by: INTERNAL MEDICINE

## 2024-09-18 PROCEDURE — 80053 COMPREHEN METABOLIC PANEL: CPT | Performed by: INTERNAL MEDICINE

## 2024-09-18 RX ORDER — FAMOTIDINE 20 MG/1
20 TABLET, FILM COATED ORAL 2 TIMES DAILY
Qty: 20 TABLET | Refills: 0 | Status: SHIPPED | OUTPATIENT
Start: 2024-09-18 | End: 2025-09-18

## 2024-09-18 RX ORDER — ONDANSETRON 4 MG/1
4 TABLET, ORALLY DISINTEGRATING ORAL EVERY 6 HOURS PRN
Qty: 15 TABLET | Refills: 0 | Status: SHIPPED | OUTPATIENT
Start: 2024-09-18

## 2024-09-18 RX ORDER — FAMOTIDINE 10 MG/ML
20 INJECTION INTRAVENOUS
Status: COMPLETED | OUTPATIENT
Start: 2024-09-18 | End: 2024-09-18

## 2024-09-18 RX ORDER — ONDANSETRON HYDROCHLORIDE 2 MG/ML
4 INJECTION, SOLUTION INTRAVENOUS ONCE
Status: COMPLETED | OUTPATIENT
Start: 2024-09-18 | End: 2024-09-18

## 2024-09-18 RX ADMIN — SODIUM CHLORIDE, POTASSIUM CHLORIDE, SODIUM LACTATE AND CALCIUM CHLORIDE 1000 ML: 600; 310; 30; 20 INJECTION, SOLUTION INTRAVENOUS at 12:09

## 2024-09-18 RX ADMIN — ONDANSETRON 4 MG: 2 INJECTION INTRAMUSCULAR; INTRAVENOUS at 12:09

## 2024-09-18 RX ADMIN — FAMOTIDINE 20 MG: 10 INJECTION INTRAVENOUS at 12:09

## 2024-09-18 NOTE — ED PROVIDER NOTES
Encounter Date: 9/18/2024       History     Chief Complaint   Patient presents with    Vomiting     To ED per EMS.  States Vomited x 2.  No vomiting with EMS or in Triage.       Presents by EMS with abdominal pain, nausea and vomiting today. States unable to keep anything down. Denies injury, headache, fever, diarrhea or hematemesis. Hx of HTN and seizures. Pain is diffuse abdomen, achy, constant, mild, non radiating. Sx Hx Appendectomy    The history is provided by the patient and the EMS personnel.     Review of patient's allergies indicates:  No Known Allergies  Past Medical History:   Diagnosis Date    Deaf     Hypertension     Migraines     Seizures      Past Surgical History:   Procedure Laterality Date    ABDOMINAL SURGERY      IMPLANTATION OF COCHLEAR PROSTHESIS      INNER EAR SURGERY Right      Family History   Problem Relation Name Age of Onset    Hypertension Mother       Social History     Tobacco Use    Smoking status: Every Day     Types: Cigars    Smokeless tobacco: Never   Substance Use Topics    Alcohol use: Not Currently    Drug use: Never     Review of Systems   Gastrointestinal:  Positive for abdominal pain, nausea and vomiting.       Physical Exam     Initial Vitals [09/18/24 0021]   BP Pulse Resp Temp SpO2   127/87 (!) 56 17 97.9 °F (36.6 °C) 99 %      MAP       --         Physical Exam    Nursing note and vitals reviewed.  Constitutional: He appears well-developed.   HENT:   Head: Normocephalic and atraumatic.   Eyes: Conjunctivae and EOM are normal. Pupils are equal, round, and reactive to light.   Neck: Neck supple.   Normal range of motion.  Cardiovascular:  Regular rhythm, normal heart sounds and intact distal pulses.           Pulmonary/Chest: Breath sounds normal. No respiratory distress.   Abdominal: Abdomen is soft. Bowel sounds are normal. He exhibits no distension. There is no abdominal tenderness. There is no rebound and no guarding.   Musculoskeletal:         General: No edema.  Normal range of motion.      Cervical back: Normal range of motion and neck supple.     Neurological: He is alert and oriented to person, place, and time. He has normal strength.   Skin: Skin is warm and dry. No rash noted.   Psychiatric: His behavior is normal.         ED Course   Procedures  Labs Reviewed   COMPREHENSIVE METABOLIC PANEL - Abnormal       Result Value    Sodium 141      Potassium 3.2 (*)     Chloride 103      CO2 30 (*)     Glucose 82      Blood Urea Nitrogen 7.0 (*)     Creatinine 0.88      Calcium 9.0      Protein Total 6.7      Albumin 4.0      Globulin 2.7      Albumin/Globulin Ratio 1.5      Bilirubin Total 0.5      ALP 55      ALT 18      AST 19      eGFR >60      Anion Gap 8.0      BUN/Creatinine Ratio 8     CBC WITH DIFFERENTIAL - Abnormal    WBC 3.11 (*)     RBC 4.22 (*)     Hgb 13.1 (*)     Hct 38.0 (*)     MCV 90.0      MCH 31.0      MCHC 34.5      RDW 13.5      Platelet 143      MPV 11.0 (*)     IPF 8.3      Neut % 40.5      Lymph % 48.6      Mono % 9.3      Eos % 1.3      Basophil % 0.3      Lymph # 1.51      Neut # 1.26 (*)     Mono # 0.29      Eos # 0.04      Baso # 0.01      IG# 0.00      IG% 0.0      NRBC% 0.0     LIPASE - Normal    Lipase Level 7     CBC W/ AUTO DIFFERENTIAL    Narrative:     The following orders were created for panel order CBC auto differential.  Procedure                               Abnormality         Status                     ---------                               -----------         ------                     CBC with Differential[[id number]]       Abnormal            Final result                 Please view results for these tests on the individual orders.   URINALYSIS, REFLEX TO URINE CULTURE          Imaging Results    None          Medications   famotidine (PF) injection 20 mg (20 mg Intravenous Given 9/18/24 0031)   ondansetron injection 4 mg (4 mg Intravenous Given 9/18/24 0031)   lactated ringers bolus 1,000 mL (0 mLs Intravenous Stopped 9/18/24  8983)     Medical Decision Making  Amount and/or Complexity of Data Reviewed  Labs: ordered. Decision-making details documented in ED Course.    Risk  Prescription drug management.      Additional MDM:   Differential Diagnosis:   Appendicitis, Diverticulitis, Pancreatitis, Pyelonephritis, AAA, Dissection, MI, Gastric Ulcer, Peptic Ulcer, Urinary retention, among others                          2:09 AM    At this time pt stable, tolerating PO intake. Diagnostics unremarkable.              Clinical Impression:  Final diagnoses:  [K29.00] Acute gastritis without hemorrhage, unspecified gastritis type (Primary)          ED Disposition Condition    Discharge Stable          ED Prescriptions       Medication Sig Dispense Start Date End Date Auth. Provider    famotidine (PEPCID) 20 MG tablet Take 1 tablet (20 mg total) by mouth 2 (two) times daily. 20 tablet 9/18/2024 9/18/2025 Gonzalo Coates MD    ondansetron (ZOFRAN-ODT) 4 MG TbDL Take 1 tablet (4 mg total) by mouth every 6 (six) hours as needed. 15 tablet 9/18/2024 -- Gonzalo Coates MD          Follow-up Information       Follow up With Specialties Details Why Contact Info    Solo Robison,  Internal Medicine Schedule an appointment as soon as possible for a visit in 2 weeks  [address] [facility] [address]  [phone]      [facility] Emergency Medicine  If symptoms worsen [address] [facility] [id number] [phone]             Gonzalo Coates MD  09/18/24 0211

## 2024-09-20 ENCOUNTER — HOSPITAL ENCOUNTER (EMERGENCY)
Facility: HOSPITAL | Age: 34
Discharge: HOME | End: 2024-09-20
Attending: STUDENT IN AN ORGANIZED HEALTH CARE EDUCATION/TRAINING PROGRAM
Payer: COMMERCIAL

## 2024-09-20 VITALS
RESPIRATION RATE: 17 BRPM | HEIGHT: 69 IN | WEIGHT: 140 LBS | DIASTOLIC BLOOD PRESSURE: 86 MMHG | HEART RATE: 54 BPM | OXYGEN SATURATION: 100 % | TEMPERATURE: 97 F | BODY MASS INDEX: 20.73 KG/M2 | SYSTOLIC BLOOD PRESSURE: 131 MMHG

## 2024-09-20 DIAGNOSIS — R53.83 OTHER FATIGUE: Primary | ICD-10-CM

## 2024-09-20 DIAGNOSIS — R53.1 WEAKNESS: ICD-10-CM

## 2024-09-20 LAB
ALBUMIN SERPL-MCNC: 4.1 G/DL (ref 3.5–5)
ALBUMIN/GLOB SERPL: 1.5 RATIO (ref 1.1–2)
ALP SERPL-CCNC: 54 UNIT/L (ref 40–150)
ALT SERPL-CCNC: 14 UNIT/L (ref 0–55)
ANION GAP SERPL CALC-SCNC: 7 MEQ/L
AST SERPL-CCNC: 18 UNIT/L (ref 5–34)
BACTERIA #/AREA URNS AUTO: ABNORMAL /HPF
BASOPHILS # BLD AUTO: 0.01 X10(3)/MCL
BASOPHILS NFR BLD AUTO: 0.3 %
BILIRUB SERPL-MCNC: 0.6 MG/DL
BILIRUB UR QL STRIP.AUTO: NEGATIVE
BUN SERPL-MCNC: 6 MG/DL (ref 8.9–20.6)
CALCIUM SERPL-MCNC: 9.5 MG/DL (ref 8.4–10.2)
CHLORIDE SERPL-SCNC: 106 MMOL/L (ref 98–107)
CLARITY UR: CLEAR
CO2 SERPL-SCNC: 28 MMOL/L (ref 22–29)
COLOR UR AUTO: YELLOW
CREAT SERPL-MCNC: 0.78 MG/DL (ref 0.73–1.18)
CREAT/UREA NIT SERPL: 8
EOSINOPHIL # BLD AUTO: 0.04 X10(3)/MCL (ref 0–0.9)
EOSINOPHIL NFR BLD AUTO: 1.2 %
ERYTHROCYTE [DISTWIDTH] IN BLOOD BY AUTOMATED COUNT: 13.3 % (ref 11.5–17)
FLUAV AG UPPER RESP QL IA.RAPID: NOT DETECTED
FLUBV AG UPPER RESP QL IA.RAPID: NOT DETECTED
GFR SERPLBLD CREATININE-BSD FMLA CKD-EPI: >60 ML/MIN/1.73/M2
GLOBULIN SER-MCNC: 2.7 GM/DL (ref 2.4–3.5)
GLUCOSE SERPL-MCNC: 82 MG/DL (ref 74–100)
GLUCOSE UR QL STRIP: NORMAL
HCT VFR BLD AUTO: 37.8 % (ref 42–52)
HGB BLD-MCNC: 13.6 G/DL (ref 14–18)
HGB UR QL STRIP: NEGATIVE
HOLD SPECIMEN: NORMAL
HYALINE CASTS #/AREA URNS LPF: ABNORMAL /LPF
IMM GRANULOCYTES # BLD AUTO: 0 X10(3)/MCL (ref 0–0.04)
IMM GRANULOCYTES NFR BLD AUTO: 0 %
KETONES UR QL STRIP: NEGATIVE
LACTATE SERPL-SCNC: 0.8 MMOL/L (ref 0.5–2.2)
LEUKOCYTE ESTERASE UR QL STRIP: NEGATIVE
LIPASE SERPL-CCNC: 7 U/L
LYMPHOCYTES # BLD AUTO: 1.61 X10(3)/MCL (ref 0.6–4.6)
LYMPHOCYTES NFR BLD AUTO: 50 %
MAGNESIUM SERPL-MCNC: 2 MG/DL (ref 1.6–2.6)
MCH RBC QN AUTO: 31.9 PG (ref 27–31)
MCHC RBC AUTO-ENTMCNC: 36 G/DL (ref 33–36)
MCV RBC AUTO: 88.7 FL (ref 80–94)
MONOCYTES # BLD AUTO: 0.13 X10(3)/MCL (ref 0.1–1.3)
MONOCYTES NFR BLD AUTO: 4 %
MUCOUS THREADS URNS QL MICRO: ABNORMAL /LPF
NEUTROPHILS # BLD AUTO: 1.43 X10(3)/MCL (ref 2.1–9.2)
NEUTROPHILS NFR BLD AUTO: 44.5 %
NITRITE UR QL STRIP: NEGATIVE
NRBC BLD AUTO-RTO: 0 %
OHS QRS DURATION: 92 MS
OHS QTC CALCULATION: 412 MS
PH UR STRIP: 7 [PH]
PLATELET # BLD AUTO: 158 X10(3)/MCL (ref 130–400)
PMV BLD AUTO: 11.4 FL (ref 7.4–10.4)
POTASSIUM SERPL-SCNC: 3.5 MMOL/L (ref 3.5–5.1)
PROT SERPL-MCNC: 6.8 GM/DL (ref 6.4–8.3)
PROT UR QL STRIP: NEGATIVE
RBC # BLD AUTO: 4.26 X10(6)/MCL (ref 4.7–6.1)
RBC #/AREA URNS AUTO: ABNORMAL /HPF
RSV A 5' UTR RNA NPH QL NAA+PROBE: NOT DETECTED
SARS-COV-2 RNA RESP QL NAA+PROBE: NOT DETECTED
SODIUM SERPL-SCNC: 141 MMOL/L (ref 136–145)
SP GR UR STRIP.AUTO: 1.01 (ref 1–1.03)
SQUAMOUS #/AREA URNS LPF: ABNORMAL /HPF
TROPONIN I SERPL-MCNC: <0.01 NG/ML (ref 0–0.04)
TSH SERPL-ACNC: 0.74 UIU/ML (ref 0.35–4.94)
UROBILINOGEN UR STRIP-ACNC: ABNORMAL
WBC # BLD AUTO: 3.22 X10(3)/MCL (ref 4.5–11.5)
WBC #/AREA URNS AUTO: ABNORMAL /HPF

## 2024-09-20 PROCEDURE — 83690 ASSAY OF LIPASE: CPT | Performed by: STUDENT IN AN ORGANIZED HEALTH CARE EDUCATION/TRAINING PROGRAM

## 2024-09-20 PROCEDURE — 84443 ASSAY THYROID STIM HORMONE: CPT | Performed by: STUDENT IN AN ORGANIZED HEALTH CARE EDUCATION/TRAINING PROGRAM

## 2024-09-20 PROCEDURE — 99285 EMERGENCY DEPT VISIT HI MDM: CPT | Mod: 25

## 2024-09-20 PROCEDURE — 0241U COVID/RSV/FLU A&B PCR: CPT | Performed by: STUDENT IN AN ORGANIZED HEALTH CARE EDUCATION/TRAINING PROGRAM

## 2024-09-20 PROCEDURE — 25000003 PHARM REV CODE 250: Performed by: STUDENT IN AN ORGANIZED HEALTH CARE EDUCATION/TRAINING PROGRAM

## 2024-09-20 PROCEDURE — 81001 URINALYSIS AUTO W/SCOPE: CPT | Performed by: STUDENT IN AN ORGANIZED HEALTH CARE EDUCATION/TRAINING PROGRAM

## 2024-09-20 PROCEDURE — 93005 ELECTROCARDIOGRAM TRACING: CPT

## 2024-09-20 PROCEDURE — 83605 ASSAY OF LACTIC ACID: CPT | Performed by: STUDENT IN AN ORGANIZED HEALTH CARE EDUCATION/TRAINING PROGRAM

## 2024-09-20 PROCEDURE — 80053 COMPREHEN METABOLIC PANEL: CPT | Performed by: STUDENT IN AN ORGANIZED HEALTH CARE EDUCATION/TRAINING PROGRAM

## 2024-09-20 PROCEDURE — 96360 HYDRATION IV INFUSION INIT: CPT

## 2024-09-20 PROCEDURE — 85025 COMPLETE CBC W/AUTO DIFF WBC: CPT | Performed by: STUDENT IN AN ORGANIZED HEALTH CARE EDUCATION/TRAINING PROGRAM

## 2024-09-20 PROCEDURE — 83735 ASSAY OF MAGNESIUM: CPT | Performed by: STUDENT IN AN ORGANIZED HEALTH CARE EDUCATION/TRAINING PROGRAM

## 2024-09-20 PROCEDURE — 84484 ASSAY OF TROPONIN QUANT: CPT | Performed by: STUDENT IN AN ORGANIZED HEALTH CARE EDUCATION/TRAINING PROGRAM

## 2024-09-20 RX ADMIN — SODIUM CHLORIDE 1000 ML: 9 INJECTION, SOLUTION INTRAVENOUS at 11:09

## 2024-09-20 NOTE — ED PROVIDER NOTES
Encounter Date: 9/20/2024       History     Chief Complaint   Patient presents with    Fatigue     Reports n/v, generalized abd pain, generalized weakness x2 days.      Patient presents to emergency department complaining of fatigue abdominal pain and nausea for the last 2 days.  No fevers.    The history is provided by the patient. The history is limited by a language barrier. A  was used.     Review of patient's allergies indicates:  No Known Allergies  Past Medical History:   Diagnosis Date    Deaf     Hypertension     Migraines     Seizures      Past Surgical History:   Procedure Laterality Date    ABDOMINAL SURGERY      IMPLANTATION OF COCHLEAR PROSTHESIS      INNER EAR SURGERY Right      Family History   Problem Relation Name Age of Onset    Hypertension Mother       Social History     Tobacco Use    Smoking status: Every Day     Average packs/day: 1 pack/day for 5.0 years (5.0 ttl pk-yrs)     Types: Cigarettes     Start date: 2010    Smokeless tobacco: Never   Substance Use Topics    Alcohol use: Not Currently    Drug use: Never     Review of Systems   Constitutional:  Positive for fatigue. Negative for chills and fever.   HENT:  Negative for congestion and sore throat.    Respiratory:  Negative for cough and shortness of breath.    Cardiovascular:  Negative for chest pain and palpitations.   Gastrointestinal:  Positive for abdominal pain and nausea.   Genitourinary:  Negative for dysuria and hematuria.   Musculoskeletal:  Negative for arthralgias and myalgias.   Neurological:  Negative for dizziness and weakness.       Physical Exam     Initial Vitals [09/20/24 0858]   BP Pulse Resp Temp SpO2   (!) 151/90 65 18 97.2 °F (36.2 °C) 97 %      MAP       --         Physical Exam    Nursing note and vitals reviewed.  Constitutional: He appears well-developed and well-nourished.   HENT:   Head: Normocephalic and atraumatic.   Eyes: Conjunctivae are normal. Pupils are equal, round, and reactive to  light.   Neck: Neck supple.   Normal range of motion.  Cardiovascular:  Normal rate and regular rhythm.           Pulmonary/Chest: Breath sounds normal. No respiratory distress.   Abdominal: Abdomen is soft. He exhibits no distension. There is no abdominal tenderness. There is no rebound.   Musculoskeletal:         General: No edema. Normal range of motion.      Cervical back: Normal range of motion and neck supple.     Neurological: He is alert and oriented to person, place, and time.   Skin: Skin is warm and dry.         ED Course   Procedures  Labs Reviewed   COMPREHENSIVE METABOLIC PANEL - Abnormal       Result Value    Sodium 141      Potassium 3.5      Chloride 106      CO2 28      Glucose 82      Blood Urea Nitrogen 6.0 (*)     Creatinine 0.78      Calcium 9.5      Protein Total 6.8      Albumin 4.1      Globulin 2.7      Albumin/Globulin Ratio 1.5      Bilirubin Total 0.6      ALP 54      ALT 14      AST 18      eGFR >60      Anion Gap 7.0      BUN/Creatinine Ratio 8     URINALYSIS, REFLEX TO URINE CULTURE - Abnormal    Color, UA Yellow      Appearance, UA Clear      Specific Gravity, UA 1.015      pH, UA 7.0      Protein, UA Negative      Glucose, UA Normal      Ketones, UA Negative      Blood, UA Negative      Bilirubin, UA Negative      Urobilinogen, UA 2+ (*)     Nitrites, UA Negative      Leukocyte Esterase, UA Negative      RBC, UA 0-5      WBC, UA 0-5      Bacteria, UA None Seen      Squamous Epithelial Cells, UA None Seen      Mucous, UA Trace (*)     Hyaline Casts, UA None Seen     CBC WITH DIFFERENTIAL - Abnormal    WBC 3.22 (*)     RBC 4.26 (*)     Hgb 13.6 (*)     Hct 37.8 (*)     MCV 88.7      MCH 31.9 (*)     MCHC 36.0      RDW 13.3      Platelet 158      MPV 11.4 (*)     Neut % 44.5      Lymph % 50.0      Mono % 4.0      Eos % 1.2      Basophil % 0.3      Lymph # 1.61      Neut # 1.43 (*)     Mono # 0.13      Eos # 0.04      Baso # 0.01      IG# 0.00      IG% 0.0      NRBC% 0.0      COVID/RSV/FLU A&B PCR - Normal    Influenza A PCR Not Detected      Influenza B PCR Not Detected      Respiratory Syncytial Virus PCR Not Detected      SARS-CoV-2 PCR Not Detected      Narrative:     The Xpert Xpress SARS-CoV-2/FLU/RSV plus is a rapid, multiplexed real-time PCR test intended for the simultaneous qualitative detection and differentiation of SARS-CoV-2, Influenza A, Influenza B, and respiratory syncytial virus (RSV) viral RNA in either nasopharyngeal swab or nasal swab specimens.         LACTIC ACID, PLASMA - Normal    Lactic Acid Level 0.8     MAGNESIUM - Normal    Magnesium Level 2.00     TROPONIN I - Normal    Troponin-I <0.010     LIPASE - Normal    Lipase Level 7     TSH - Normal    TSH 0.741     CBC W/ AUTO DIFFERENTIAL    Narrative:     The following orders were created for panel order CBC auto differential.  Procedure                               Abnormality         Status                     ---------                               -----------         ------                     CBC with Differential[[id number]]       Abnormal            Final result                 Please view results for these tests on the individual orders.   EXTRA TUBES    Narrative:     The following orders were created for panel order EXTRA TUBES.  Procedure                               Abnormality         Status                     ---------                               -----------         ------                     Light Blue Top Hold[[id number]]                             Final result               Gold Top Hold[[id number]]                                   Final result               Pink Top Hold[[id number]]                                   Final result                 Please view results for these tests on the individual orders.   LIGHT BLUE TOP HOLD    Extra Tube Hold for add-ons.     GOLD TOP HOLD    Extra Tube Hold for add-ons.     PINK TOP HOLD    Extra Tube Hold for add-ons.       EKG Readings:  (Independently Interpreted)   Rhythm: Sinus Bradycardia. Heart Rate: 53. Ectopy: No Ectopy. Conduction: Normal. Axis: Normal.     ECG Results              EKG 12-lead (Final result)        Collection Time Result Time QRS Duration OHS QTC Calculation    09/20/24 09:52:20 09/20/24 12:09:18 92 412                     Final result by Interface, Lab In [facility] (09/20/24 12:09:23)                   Narrative:    Test Reason : R53.1,    Vent. Rate : 053 BPM     Atrial Rate : 053 BPM     P-R Int : 156 ms          QRS Dur : 092 ms      QT Int : 440 ms       P-R-T Axes : 080 063 056 degrees     QTc Int : 412 ms    Sinus bradycardia  Otherwise normal ECG  Confirmed by Raymundo Lopez MD ([id number]) on 9/20/2024 12:09:13 PM    Referred By:             Confirmed By:Raymundo Lopez MD                                     EKG 12-lead (Final result)  Result time 09/26/24 15:34:28      Final result by Unknown User (09/26/24 15:34:28)                                      Imaging Results              CT Abdomen Pelvis  Without Contrast (Final result)  Result time 09/20/24 11:39:37   Procedure changed from CT Abdomen Pelvis With IV Contrast NO Oral Contrast     Final result by Mita Alvarado MD (09/20/24 11:39:37)                   Impression:      No acute abnormality of the abdomen or pelvis.      Electronically signed by: Mita Alvarado  Date:    09/20/2024  Time:    11:39               Narrative:    EXAMINATION:  CT ABDOMEN PELVIS WITHOUT CONTRAST    CLINICAL HISTORY:  Epigastric pain;    TECHNIQUE:  CT imaging was performed of the abdomen and pelvis without intravenous contrast. Dose length product is 152 mGycm. Automatic exposure control, adjustment of mA/kV or iterative reconstruction technique was used to limit radiation dose.    COMPARISON:  CT abdomen pelvis dated 12/25/2020    FINDINGS:  Assessment of the visceral organs and vasculature is limited by the lack of IV contrast.    Liver/biliary: No concerning hepatic findings. No  radiodense gallstone or biliary dilatation appreciated.    Pancreas: Normal.    Spleen: Normal.    Adrenals: Normal.    Kidneys, ureters and bladder: No obstructing urinary calculus.  No hydronephrosis.  The bladder is within normal limits.    Reproductive organs: No pelvic masses.    Stomach/bowel: No evidence of bowel obstruction. The appendix is not definitely visualized.  No discernible bowel inflammation.    Lymph nodes: No pathologically enlarged lymph node identified with noncontrast technique.    Peritoneum: No ascites or free air.    Vessels: Normal abdominal aortic caliber.    Abdominal wall: Normal.    Lung bases: No consolidation or pleural effusion.    Bones: No acute osseous findings.                                       X-Ray Chest AP Portable (Final result)  Result time 09/20/24 09:42:58      Final result by Marcie Banks MD (09/20/24 09:42:58)                   Impression:      No acute cardiopulmonary abnormality.      Electronically signed by: Marcie Banks  Date:    09/20/2024  Time:    09:42               Narrative:    EXAMINATION:  XR CHEST AP PORTABLE    CLINICAL HISTORY:  generalized weakness;    TECHNIQUE:  Single frontal view of the chest was performed.    COMPARISON:  06/30/2023    FINDINGS:  LINES AND TUBES: None    MEDIASTINUM AND MANUEL: The cardiac silhouette is normal.    LUNGS: No lobar consolidation. No edema.    PLEURA:No pleural effusion. No pneumothorax.    BONES: No acute osseous abnormality.                                       Medications   sodium chloride 0.9% bolus 1,000 mL 1,000 mL (0 mLs Intravenous Stopped 9/20/24 1250)     Medical Decision Making  Vital signs are stable.  EKGs without acute ischemic changes.  CBC, CMP reassuring.  Troponin within normal limits.  Chest x-ray is generally clear.  CT of the abdomen pelvis without acute abnormalities.  Patient had improvement in his symptoms after IV fluids, given reassuring workup here, will discharge to follow  up with the primary care physician    Amount and/or Complexity of Data Reviewed  Labs: ordered. Decision-making details documented in ED Course.  Radiology: ordered. Decision-making details documented in ED Course.  ECG/medicine tests: ordered and independent interpretation performed. Decision-making details documented in ED Course.                                      Clinical Impression:  Final diagnoses:  [R53.1] Weakness  [R53.83] Other fatigue (Primary)          ED Disposition Condition    Discharge Stable          ED Prescriptions    None       Follow-up Information       Follow up With Specialties Details Why Contact Info    [facility] Emergency Dept Emergency Medicine Go to  If symptoms worsen [address] [facility] [place] [id number] [phone]    Solo Robison, DO Internal Medicine Call  As needed [address] [facility] [address]  [phone]               Salvador Fish MD  10/16/24 [id number]

## 2024-09-20 NOTE — Clinical Note
"Chun Patel"Amado was seen and treated in our emergency department on 9/20/2024.  He may return to work on 09/23/2024.       If you have any questions or concerns, please don't hesitate to call.      Salvador Fish MD"

## 2024-09-26 ENCOUNTER — OFFICE VISIT (OUTPATIENT)
Dept: INTERNAL MEDICINE | Facility: CLINIC | Age: 34
End: 2024-09-26
Payer: COMMERCIAL

## 2024-09-26 DIAGNOSIS — G40.909 NONINTRACTABLE EPILEPSY WITHOUT STATUS EPILEPTICUS, UNSPECIFIED EPILEPSY TYPE: ICD-10-CM

## 2024-09-26 DIAGNOSIS — Z72.0 TOBACCO USE: ICD-10-CM

## 2024-09-26 DIAGNOSIS — F25.9 SCHIZOAFFECTIVE DISORDER, UNSPECIFIED TYPE: ICD-10-CM

## 2024-09-26 DIAGNOSIS — Z00.00 HEALTHCARE MAINTENANCE: Primary | ICD-10-CM

## 2024-09-26 DIAGNOSIS — F32.A DEPRESSION, UNSPECIFIED DEPRESSION TYPE: ICD-10-CM

## 2024-09-26 DIAGNOSIS — H04.123 DRY EYES: ICD-10-CM

## 2024-09-26 PROCEDURE — 90656 IIV3 VACC NO PRSV 0.5 ML IM: CPT | Mod: PBBFAC

## 2024-09-26 PROCEDURE — G0008 ADMIN INFLUENZA VIRUS VAC: HCPCS | Mod: PBBFAC

## 2024-09-26 PROCEDURE — 99212 OFFICE O/P EST SF 10 MIN: CPT | Mod: PBBFAC

## 2024-09-26 RX ORDER — MINERAL OIL AND PETROLATUM 150; 830 MG/G; MG/G
OINTMENT OPHTHALMIC NIGHTLY
Qty: 3.5 G | Refills: 3 | Status: SHIPPED | OUTPATIENT
Start: 2024-09-26

## 2024-09-26 RX ADMIN — INFLUENZA VIRUS VACCINE 0.5 ML: 15; 15; 15 SUSPENSION INTRAMUSCULAR at 09:09

## 2024-09-26 NOTE — PROGRESS NOTES
Attending Addendum:   Patient seen and examined in clinic. Management and Plan were discussed with resident. Care was reasonable and necessary.   Radha Hernandez MD  [facility] - Internal Medicine

## 2024-09-26 NOTE — PROGRESS NOTES
[facility] INTERNAL MEDICINE  OUTPATIENT OFFICE VISIT NOTE    SUBJECTIVE:      Chief Complaint: viral infectio  (Was sick for 4 days)       HPI: Chun Fischer is a 34 y.o. yo male w/ PMH of  has a past medical history of Deaf, epilepsy?  Schizoaffective disorder per chart review, chronic oral pain., who presents for 4 month(s) follow up. Here with Grandfather. Has no complaints - reports being sick last week and evaluated in ED.  Initially went for abdominal pain on 9/18 and then fatigue 9/20. Given famotidine, zofran, and LR bolus. States he feels better now. Had breakthrough seizure last week but was not taking depakote per neurology. Instructed pt to take Depakote as prescribed. Advised to take abilify as prescribed also. Has been doing well with anger per Grandfather.  Continues to follow with Dentistry and scheduled to have dentures placed in upcoming month. Working at [employer] and has own apartment. Will get flu-shot today.    Follow up in 6 months.    Past Medical History:   has a past medical history of Deaf, Hypertension, Migraines, and Seizures.     Past Surgical History:   has a past surgical history that includes Inner ear surgery (Right); Implantation of cochlear prosthesis; and Abdominal surgery.     Family History:  family history includes Hypertension in his mother.     Social History:   reports that he has been smoking cigarettes. He started smoking about 14 years ago. He has a 5 pack-year smoking history. He has never used smokeless tobacco. He reports that he does not currently use alcohol. He reports that he does not use drugs.     Allergies:  has No Known Allergies.     Home Medications:  Current Outpatient Medications   Medication Instructions    ARIPiprazole (ABILIFY) 5 mg, Oral, Daily    divalproex ER (DEPAKOTE ER) 500 mg, Oral, Nightly    famotidine (PEPCID) 20 mg, Oral, 2 times daily    ondansetron (ZOFRAN-ODT) 4 mg, Oral, Every 6 hours PRN    white petrolatum-mineral oiL (ARTIFICIAL TEARS,  ROSALBA/MIN,) 83-15 % Oint Both Eyes, Nightly        ROS:  Review of Systems   Constitutional:  Negative for chills and fever.   HENT:  Negative for ear pain.    Respiratory:  Negative for cough and shortness of breath.    Cardiovascular:  Negative for chest pain.   Gastrointestinal:  Negative for abdominal pain, heartburn, nausea and vomiting.   Neurological:  Negative for seizures and headaches.   Psychiatric/Behavioral:  Negative for depression and suicidal ideas.           OBJECTIVE:     Vital signs:   There were no vitals taken for this visit.     Physical Examination:  General: Well nourished w/o distress  Neck: Full ROM; no lymphadenopathy  Pulm: CTA bilaterally, normal work of breathing  CV: S1, S2 w/o murmurs or gallops; no edema noted  GI: Soft with normal bowel sounds in all quadrants, no masses on palpation  MSK: Full ROM of all extremities and spine w/o limitation or discomfort  Derm: No rashes, abnormal bruising, or skin lesions  Neuro: AAOx4; CN II-XII intact; motor/sensory function intact      Labs:  CMP:   Recent Labs   Lab 09/02/24 1935 09/18/24 0031 09/20/24  0934   Sodium 143 141 141   Potassium 3.2 L 3.2 L 3.5   CO2 26 30 H 28   Blood Urea Nitrogen 5.1 L 7.0 L 6.0 L   Creatinine 0.78 0.88 0.78   Glucose 74 82 82   Calcium 9.0 9.0 9.5   Albumin 3.9 4.0 4.1   Bilirubin Total 0.5 0.5 0.6   AST 32 19 18   ALT 32 18 14   ALP 48 55 54     CBC:   Recent Labs   Lab 09/02/24 1935 09/18/24 0031 09/20/24  0934   WBC 3.77 L 3.11 L 3.22 L   Neut # 1.30 L 1.26 L 1.43 L   RBC 4.00 L 4.22 L 4.26 L   Hgb 12.6 L 13.1 L 13.6 L   Hct 35.7 L 38.0 L 37.8 L   Platelet 129 L 143 158   MCV 89.3 90.0 88.7   RDW 14.1 13.5 13.3     FLP:         DM:   Recent Labs   Lab 09/02/24 1935 09/18/24 0031 09/20/24  0934   Creatinine 0.78 0.88 0.78     Thyroid:   Recent Labs   Lab 08/20/22  1704 10/07/23  0753 09/20/24  0934   TSH 0.9325 1.163 0.741     Anemia:   Recent Labs   Lab 09/20/24  0934   Hgb 13.6 L   Hct 37.8 L            ASSESSMENT & PLAN:      Schizoaffective disorder   - continue abilify 5 mg QD  - continue following with Psychiatry as scheduled  - strongly reinforced attending appointments  - states he has behavior health at home?     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   - s/p tooth extraction w/ [facility] dentistry   - medical release form signed to obtain records  - dentures to be done in upcoming months  - ibuprofen p.r.n.    Tobacco use disorder  - 1 pack/day since 20 y/o (8 pack year)  - encouraged reducing tobacco use - has cut down   - taking Rx nicotine patches and gum as prescribed    ALEA   - referred to sleep medicine previously  - pt deferring      Health Maintenance  Vaccinations  Immunization History   Administered Date(s) Administered    DTP 1989, 1989, 1989, 11/29/1990    HIB 11/01/1990    Hepatitis A, Adult 03/28/2008, 09/30/2008    Influenza - Quadrivalent - PF *Preferred* (6 months and older) 01/17/2024    Influenza - Trivalent - Fluarix, Flulaval, Fluzone, Afluria - PF 12/10/2020, 09/26/2024    MMR 08/10/1990    OPV 1989, 1989, 11/01/1990    Pneumococcal Conjugate - 13 Valent 07/13/2016    Td (ADULT) 01/17/2014    Td (Adult), Unspecified Formulation 02/28/2000    Td - PF (ADULT) 04/28/2015    Tdap 01/12/2014, 11/13/2014, 09/05/2016, 02/24/2017, 07/17/2021, 12/30/2021, 02/01/2022       -Flu:  Done 1/71/24     -Pneumonia: will address at next visit    Screening   -Lung Ca. Screening:  Not of age   -Colon Ca. Screening:  Not of age   -Hep C, HIV:  Ordered today  Social   -EtOH:  reports that he does not currently use alcohol.    -Tobacco:  reports that he has been smoking cigarettes. He started smoking about 14 years ago. He has a 5 pack-year smoking history. He has never used smokeless tobacco.   -Drugs:  reports no history of drug use.    -Depression:   Depression: Low Risk  (5/8/2024)    Depression     Last PHQ-4: Flowsheet Data: 2   Recent Concern: Depression - High Risk (3/11/2024)    Depression     Last PHQ-4: Flowsheet Data: 21          Return to clinic in 6 month(s).    Solo Robison DO  [facility] Internal Medicine PGY-3    Orders Placed This Encounter    influenza (Flulaval, Fluzone, Fluarix) 45 mcg/0.5 mL IM vaccine (> or = 6 mo) 0.5 mL    white petrolatum-mineral oiL (ARTIFICIAL TEARS, ROSALBA/MIN,) 83-15 % Oint

## 2024-09-28 ENCOUNTER — HOSPITAL ENCOUNTER (EMERGENCY)
Facility: HOSPITAL | Age: 34
Discharge: HOME | End: 2024-09-28
Attending: EMERGENCY MEDICINE
Payer: COMMERCIAL

## 2024-09-28 VITALS
OXYGEN SATURATION: 98 % | HEIGHT: 69 IN | RESPIRATION RATE: 19 BRPM | DIASTOLIC BLOOD PRESSURE: 94 MMHG | SYSTOLIC BLOOD PRESSURE: 121 MMHG | WEIGHT: 140 LBS | TEMPERATURE: 98 F | BODY MASS INDEX: 20.73 KG/M2 | HEART RATE: 68 BPM

## 2024-09-28 DIAGNOSIS — G40.009 LOCALIZATION-RELATED (FOCAL) (PARTIAL) IDIOPATHIC EPILEPSY AND EPILEPTIC SYNDROMES WITH SEIZURES OF LOCALIZED ONSET, NOT INTRACTABLE, WITHOUT STATUS EPILEPTICUS: ICD-10-CM

## 2024-09-28 DIAGNOSIS — G40.919 BREAKTHROUGH SEIZURE: ICD-10-CM

## 2024-09-28 DIAGNOSIS — H10.13 ALLERGIC CONJUNCTIVITIS OF BOTH EYES: Primary | ICD-10-CM

## 2024-09-28 LAB
ANION GAP SERPL CALC-SCNC: 9 MEQ/L
BASOPHILS # BLD AUTO: 0.01 X10(3)/MCL
BASOPHILS NFR BLD AUTO: 0.2 %
BUN SERPL-MCNC: 9 MG/DL (ref 8.9–20.6)
CALCIUM SERPL-MCNC: 9.7 MG/DL (ref 8.4–10.2)
CHLORIDE SERPL-SCNC: 106 MMOL/L (ref 98–107)
CO2 SERPL-SCNC: 26 MMOL/L (ref 22–29)
CREAT SERPL-MCNC: 0.79 MG/DL (ref 0.73–1.18)
CREAT/UREA NIT SERPL: 11
EOSINOPHIL # BLD AUTO: 0.09 X10(3)/MCL (ref 0–0.9)
EOSINOPHIL NFR BLD AUTO: 2 %
ERYTHROCYTE [DISTWIDTH] IN BLOOD BY AUTOMATED COUNT: 13.5 % (ref 11.5–17)
GFR SERPLBLD CREATININE-BSD FMLA CKD-EPI: >60 ML/MIN/1.73/M2
GLUCOSE SERPL-MCNC: 101 MG/DL (ref 74–100)
HCT VFR BLD AUTO: 40.1 % (ref 42–52)
HGB BLD-MCNC: 13.6 G/DL (ref 14–18)
HOLD SPECIMEN: NORMAL
IMM GRANULOCYTES # BLD AUTO: 0.01 X10(3)/MCL (ref 0–0.04)
IMM GRANULOCYTES NFR BLD AUTO: 0.2 %
LYMPHOCYTES # BLD AUTO: 2.37 X10(3)/MCL (ref 0.6–4.6)
LYMPHOCYTES NFR BLD AUTO: 51.6 %
MCH RBC QN AUTO: 30.8 PG (ref 27–31)
MCHC RBC AUTO-ENTMCNC: 33.9 G/DL (ref 33–36)
MCV RBC AUTO: 90.9 FL (ref 80–94)
MONOCYTES # BLD AUTO: 0.26 X10(3)/MCL (ref 0.1–1.3)
MONOCYTES NFR BLD AUTO: 5.7 %
NEUTROPHILS # BLD AUTO: 1.85 X10(3)/MCL (ref 2.1–9.2)
NEUTROPHILS NFR BLD AUTO: 40.3 %
NRBC BLD AUTO-RTO: 0 %
PLATELET # BLD AUTO: 193 X10(3)/MCL (ref 130–400)
PMV BLD AUTO: 11.4 FL (ref 7.4–10.4)
POTASSIUM SERPL-SCNC: 3.9 MMOL/L (ref 3.5–5.1)
RBC # BLD AUTO: 4.41 X10(6)/MCL (ref 4.7–6.1)
SODIUM SERPL-SCNC: 141 MMOL/L (ref 136–145)
VALPROATE SERPL-MCNC: 14.6 UG/ML (ref 50–100)
WBC # BLD AUTO: 4.59 X10(3)/MCL (ref 4.5–11.5)

## 2024-09-28 PROCEDURE — 85025 COMPLETE CBC W/AUTO DIFF WBC: CPT | Performed by: EMERGENCY MEDICINE

## 2024-09-28 PROCEDURE — 25000003 PHARM REV CODE 250: Performed by: EMERGENCY MEDICINE

## 2024-09-28 PROCEDURE — 99284 EMERGENCY DEPT VISIT MOD MDM: CPT

## 2024-09-28 PROCEDURE — 80048 BASIC METABOLIC PNL TOTAL CA: CPT | Performed by: EMERGENCY MEDICINE

## 2024-09-28 PROCEDURE — 80164 ASSAY DIPROPYLACETIC ACD TOT: CPT | Performed by: EMERGENCY MEDICINE

## 2024-09-28 RX ORDER — DIVALPROEX SODIUM 500 MG/1
1000 TABLET, FILM COATED, EXTENDED RELEASE ORAL NIGHTLY
Qty: 60 TABLET | Refills: 3 | Status: SHIPPED | OUTPATIENT
Start: 2024-09-28 | End: 2025-01-26

## 2024-09-28 RX ORDER — DIVALPROEX SODIUM 250 MG/1
500 TABLET, DELAYED RELEASE ORAL
Status: COMPLETED | OUTPATIENT
Start: 2024-09-28 | End: 2024-09-28

## 2024-09-28 RX ORDER — KETOTIFEN FUMARATE 0.35 MG/ML
1 SOLUTION/ DROPS OPHTHALMIC 2 TIMES DAILY
Qty: 5 ML | Refills: 0 | Status: SHIPPED | OUTPATIENT
Start: 2024-09-28 | End: 2025-09-28

## 2024-09-28 RX ADMIN — DIVALPROEX SODIUM 500 MG: 250 TABLET, DELAYED RELEASE ORAL at 07:09

## 2024-09-28 NOTE — ED PROVIDER NOTES
ED PROVIDER NOTE  9/28/2024    CHIEF COMPLAINT:   Chief Complaint   Patient presents with    Seizures     C/o seizure at work today. States has been taking meds. Ems states was postictal upon arrival. C collar in place. Difficult to understand verbally which is his normal.        HISTORY OF PRESENT ILLNESS:   Chun Fischer is a 34 y.o. male who presents with chief complaint Seizure.  Onset was just prior to arrival when patient had seizure while at work.  He states that he has been trying to take his Depakote, states that he takes his Depakote at night as directed.  He also complains of itching burning eyes for the past 2 weeks and states he needs some eyedrops.  Denies headache, neck pain, nausea, vomiting.    The history is provided by the patient. The history is limited by a language barrier. A  was used.         REVIEW OF SYSTEMS: as noted in the HPI.  NURSING NOTES REVIEWED      PAST MEDICAL/SURGICAL HISTORY:   Past Medical History:   Diagnosis Date    Deaf     Hypertension     Migraines     Seizures       Past Surgical History:   Procedure Laterality Date    ABDOMINAL SURGERY      IMPLANTATION OF COCHLEAR PROSTHESIS      INNER EAR SURGERY Right        FAMILY HISTORY:   Family History   Problem Relation Name Age of Onset    Hypertension Mother         SOCIAL HISTORY:   Social History     Tobacco Use    Smoking status: Every Day     Average packs/day: 1 pack/day for 5.0 years (5.0 ttl pk-yrs)     Types: Cigarettes     Start date: 2010    Smokeless tobacco: Never   Substance Use Topics    Alcohol use: Not Currently    Drug use: Never       ALLERGIES: Review of patient's allergies indicates:  No Known Allergies    PHYSICAL EXAM:  Initial Vitals [09/28/24 1752]   BP Pulse Resp Temp SpO2   (!) 133/97 81 18 97.7 °F (36.5 °C) 99 %      MAP       --         Physical Exam    Nursing note and vitals reviewed.  Constitutional: He appears well-developed and well-nourished. No distress. Cervical  collar in place.   HENT:   Head: Normocephalic and atraumatic.   Nose: Nose normal. Mouth/Throat: Oropharynx is clear and moist and mucous membranes are normal.   Eyes: Conjunctivae and EOM are normal. Pupils are equal, round, and reactive to light.   Neck: Neck supple. No tracheal deviation present.   Cardiovascular:  Normal rate, regular rhythm, normal heart sounds, intact distal pulses and normal pulses.           Pulmonary/Chest: Effort normal and breath sounds normal. No respiratory distress.   Abdominal: Abdomen is soft. There is no abdominal tenderness. There is no rebound and no guarding.   Musculoskeletal:         General: Normal range of motion.      Cervical back: Neck supple. No bony tenderness.     Neurological: He is alert.   Skin: Skin is warm, dry and intact.   Psychiatric: He has a normal mood and affect. His behavior is normal. Judgment and thought content normal. Cognition and memory are normal.         RESULTS:  Labs Reviewed   BASIC METABOLIC PANEL - Abnormal       Result Value    Sodium 141      Potassium 3.9      Chloride 106      CO2 26      Glucose 101 (*)     Blood Urea Nitrogen 9.0      Creatinine 0.79      BUN/Creatinine Ratio 11      Calcium 9.7      Anion Gap 9.0      eGFR >60     VALPROIC ACID - Abnormal    Valproic Acid 14.6 (*)    CBC WITH DIFFERENTIAL - Abnormal    WBC 4.59      RBC 4.41 (*)     Hgb 13.6 (*)     Hct 40.1 (*)     MCV 90.9      MCH 30.8      MCHC 33.9      RDW 13.5      Platelet 193      MPV 11.4 (*)     Neut % 40.3      Lymph % 51.6      Mono % 5.7      Eos % 2.0      Basophil % 0.2      Lymph # 2.37      Neut # 1.85 (*)     Mono # 0.26      Eos # 0.09      Baso # 0.01      IG# 0.01      IG% 0.2      NRBC% 0.0     CBC W/ AUTO DIFFERENTIAL    Narrative:     The following orders were created for panel order CBC auto differential.  Procedure                               Abnormality         Status                     ---------                               -----------          ------                     CBC with Differential[[id number]]       Abnormal            Final result                 Please view results for these tests on the individual orders.   EXTRA TUBES    Narrative:     The following orders were created for panel order EXTRA TUBES.  Procedure                               Abnormality         Status                     ---------                               -----------         ------                     Light Blue Top Hold[[id number]]                             In process                 Red Top Hold[[id number]]                                    In process                 Light Green Top Hold[[id number]]                            In process                 Lavender Top Hold[[id number]]                               In process                 Gold Top Hold[[id number]]                                   In process                 Pink Top Hold[[id number]]                                   In process                   Please view results for these tests on the individual orders.   LIGHT BLUE TOP HOLD   RED TOP HOLD   LIGHT GREEN TOP HOLD   LAVENDER TOP HOLD   GOLD TOP HOLD   PINK TOP HOLD     Imaging Results    None         PROCEDURES:  Procedures    ECG:       ED COURSE AND MEDICAL DECISION MAKING:  Medications   divalproex EC tablet 500 mg (has no administration in time range)     ED Course as of 09/28/24 1856   Sat Sep 28, 2024   1848 WBC: 4.59 [IB]   1848 Platelet Count: 193 [IB]   1848 Valproic Acid Lvl(!): 14.6 [IB]   1848 Creatinine: 0.79 [IB]      ED Course User Index  [IB] Landon Glaser, DO        Medical Decision Making  34-year-old male who presents after having seizure at work.  He reports compliance with his Depakote, although he does have a history of noncompliance.  Depakote level today is subtherapeutic at 14.6, it appears her last time he had a therapeutic Depakote level was 3 years ago.  I think he would benefit from increasing his Depakote dose to  1000 mg daily which is well within the recommended max dose.  He also complains of burning itching eyes so will provide him a prescription for ketotifen.  Instructed to remain compliant with his seizure medications and follow up with his PCP and neurologist.  I discussed seizure precautions with the patient and/or caregiver and clearly stated specific activities that should be avoided such as: driving a motor vehicle for at least 6 months, climbing heights > 10 feet, cooking using the oven or a fire, using heavy machinery and power tools, taking a bath unsupervised, and swimming unattended.  Given strict ED return precautions. I have spoken with the patient and/or caregivers. I have explained the patient's condition, diagnoses and treatment plan based on the information available to me at this time. I have answered the patient's and/or caregiver's questions and addressed any concerns. The patient and/or caregivers have as good an understanding of the patient's diagnosis, condition and treatment plan as can be expected at this point. The vital signs have been stable. The patient's condition is stable and appropriate for discharge from the emergency department.     The patient will pursue further outpatient evaluation with the primary care physician or other designated or consulting physician as outlined in the discharge instructions. The patient and/or caregivers are agreeable to this plan of care and follow-up instructions have been explained in detail. The patient and/or caregivers have received these instructions in written format and have expressed an understanding of the discharge instructions. The patient and/or caregivers are aware that any significant change in condition or worsening of symptoms should prompt an immediate return to this or the closest emergency department or a call to 911.      Amount and/or Complexity of Data Reviewed  External Data Reviewed: labs and notes.  Labs: ordered. Decision-making  details documented in ED Course.    Risk  OTC drugs.  Prescription drug management.  Diagnosis or treatment significantly limited by social determinants of health.        CLINICAL IMPRESSION:  1. Allergic conjunctivitis of both eyes    2. Breakthrough seizure    3. Localization-related (focal) (partial) idiopathic epilepsy and epileptic syndromes with seizures of localized onset, not intractable, without status epilepticus        DISPOSITION:   ED Disposition Condition    Discharge Stable            ED Prescriptions       Medication Sig Dispense Start Date End Date Auth. Provider    ketotifen (ALLERGY EYE, KETOTIFEN,) 0.025 % (0.035 %) ophthalmic solution Place 1 drop into both eyes 2 (two) times daily. 5 mL 9/28/2024 9/28/2025 Landon Glaser DO    divalproex ER (DEPAKOTE ER) 500 MG Tb24 24 hr tablet Take 2 tablets (1,000 mg total) by mouth every evening. 60 tablet 9/28/2024 1/26/2025 Landon Glaser DO          Follow-up Information       Follow up With Specialties Details Why Contact Info    Solo Robison DO Internal Medicine Schedule an appointment as soon as possible for a visit   [address] [facility] [address]  [phone]      [facility] Emergency Medicine  If symptoms worsen [address] [facility] [id number] [phone]               Landon Glaser DO  09/28/24 9258

## 2024-10-27 ENCOUNTER — HOSPITAL ENCOUNTER (EMERGENCY)
Facility: HOSPITAL | Age: 34
Discharge: HOME | End: 2024-10-27
Attending: EMERGENCY MEDICINE
Payer: COMMERCIAL

## 2024-10-27 VITALS
HEART RATE: 52 BPM | HEIGHT: 69 IN | DIASTOLIC BLOOD PRESSURE: 78 MMHG | SYSTOLIC BLOOD PRESSURE: 114 MMHG | TEMPERATURE: 97 F | RESPIRATION RATE: 16 BRPM | WEIGHT: 140 LBS | BODY MASS INDEX: 20.73 KG/M2 | OXYGEN SATURATION: 99 %

## 2024-10-27 DIAGNOSIS — G40.919 BREAKTHROUGH SEIZURE: Primary | ICD-10-CM

## 2024-10-27 LAB
ALBUMIN SERPL-MCNC: 4 G/DL (ref 3.5–5)
ALBUMIN/GLOB SERPL: 1.6 RATIO (ref 1.1–2)
ALP SERPL-CCNC: 47 UNIT/L (ref 40–150)
ALT SERPL-CCNC: 12 UNIT/L (ref 0–55)
ANION GAP SERPL CALC-SCNC: 10 MEQ/L
AST SERPL-CCNC: 20 UNIT/L (ref 5–34)
BASOPHILS # BLD AUTO: 0.01 X10(3)/MCL
BASOPHILS NFR BLD AUTO: 0.3 %
BILIRUB SERPL-MCNC: 0.5 MG/DL
BUN SERPL-MCNC: 8.5 MG/DL (ref 8.9–20.6)
CALCIUM SERPL-MCNC: 8.9 MG/DL (ref 8.4–10.2)
CHLORIDE SERPL-SCNC: 109 MMOL/L (ref 98–107)
CO2 SERPL-SCNC: 23 MMOL/L (ref 22–29)
CREAT SERPL-MCNC: 0.8 MG/DL (ref 0.72–1.25)
CREAT/UREA NIT SERPL: 11
EOSINOPHIL # BLD AUTO: 0.06 X10(3)/MCL (ref 0–0.9)
EOSINOPHIL NFR BLD AUTO: 1.9 %
ERYTHROCYTE [DISTWIDTH] IN BLOOD BY AUTOMATED COUNT: 13.1 % (ref 11.5–17)
GFR SERPLBLD CREATININE-BSD FMLA CKD-EPI: >60 ML/MIN/1.73/M2
GLOBULIN SER-MCNC: 2.5 GM/DL (ref 2.4–3.5)
GLUCOSE SERPL-MCNC: 90 MG/DL (ref 74–100)
HCT VFR BLD AUTO: 34.9 % (ref 42–52)
HGB BLD-MCNC: 12.1 G/DL (ref 14–18)
HOLD SPECIMEN: NORMAL
IMM GRANULOCYTES # BLD AUTO: 0.01 X10(3)/MCL (ref 0–0.04)
IMM GRANULOCYTES NFR BLD AUTO: 0.3 %
LYMPHOCYTES # BLD AUTO: 1.83 X10(3)/MCL (ref 0.6–4.6)
LYMPHOCYTES NFR BLD AUTO: 56.8 %
MAGNESIUM SERPL-MCNC: 1.8 MG/DL (ref 1.6–2.6)
MCH RBC QN AUTO: 31.7 PG (ref 27–31)
MCHC RBC AUTO-ENTMCNC: 34.7 G/DL (ref 33–36)
MCV RBC AUTO: 91.4 FL (ref 80–94)
MONOCYTES # BLD AUTO: 0.16 X10(3)/MCL (ref 0.1–1.3)
MONOCYTES NFR BLD AUTO: 5 %
NEUTROPHILS # BLD AUTO: 1.15 X10(3)/MCL (ref 2.1–9.2)
NEUTROPHILS NFR BLD AUTO: 35.7 %
NRBC BLD AUTO-RTO: 0 %
PLATELET # BLD AUTO: 144 X10(3)/MCL (ref 130–400)
PLATELETS.RETICULATED NFR BLD AUTO: 5.9 % (ref 0.9–11.2)
PMV BLD AUTO: 11 FL (ref 7.4–10.4)
POTASSIUM SERPL-SCNC: 3.1 MMOL/L (ref 3.5–5.1)
PROT SERPL-MCNC: 6.5 GM/DL (ref 6.4–8.3)
RBC # BLD AUTO: 3.82 X10(6)/MCL (ref 4.7–6.1)
SODIUM SERPL-SCNC: 142 MMOL/L (ref 136–145)
WBC # BLD AUTO: 3.22 X10(3)/MCL (ref 4.5–11.5)

## 2024-10-27 PROCEDURE — 25000003 PHARM REV CODE 250: Performed by: EMERGENCY MEDICINE

## 2024-10-27 PROCEDURE — 85025 COMPLETE CBC W/AUTO DIFF WBC: CPT | Performed by: EMERGENCY MEDICINE

## 2024-10-27 PROCEDURE — 80053 COMPREHEN METABOLIC PANEL: CPT | Performed by: EMERGENCY MEDICINE

## 2024-10-27 PROCEDURE — 83735 ASSAY OF MAGNESIUM: CPT | Performed by: EMERGENCY MEDICINE

## 2024-10-27 PROCEDURE — 99283 EMERGENCY DEPT VISIT LOW MDM: CPT

## 2024-10-27 RX ORDER — DIVALPROEX SODIUM 250 MG/1
500 TABLET, DELAYED RELEASE ORAL
Status: COMPLETED | OUTPATIENT
Start: 2024-10-27 | End: 2024-10-27

## 2024-10-27 RX ADMIN — DIVALPROEX SODIUM 500 MG: 250 TABLET, DELAYED RELEASE ORAL at 01:10

## 2024-11-18 ENCOUNTER — HOSPITAL ENCOUNTER (EMERGENCY)
Facility: HOSPITAL | Age: 34
Discharge: HOME | End: 2024-11-18
Attending: EMERGENCY MEDICINE
Payer: COMMERCIAL

## 2024-11-18 VITALS
HEART RATE: 60 BPM | BODY MASS INDEX: 20.75 KG/M2 | RESPIRATION RATE: 18 BRPM | TEMPERATURE: 98 F | WEIGHT: 140.13 LBS | DIASTOLIC BLOOD PRESSURE: 88 MMHG | OXYGEN SATURATION: 100 % | SYSTOLIC BLOOD PRESSURE: 114 MMHG | HEIGHT: 69 IN

## 2024-11-18 DIAGNOSIS — R07.89 CHEST WALL PAIN: Primary | ICD-10-CM

## 2024-11-18 LAB
ALBUMIN SERPL-MCNC: 4.2 G/DL (ref 3.5–5)
ALBUMIN/GLOB SERPL: 1.6 RATIO (ref 1.1–2)
ALP SERPL-CCNC: 56 UNIT/L (ref 40–150)
ALT SERPL-CCNC: 11 UNIT/L (ref 0–55)
ANION GAP SERPL CALC-SCNC: 6 MEQ/L
AST SERPL-CCNC: 20 UNIT/L (ref 5–34)
BACTERIA #/AREA URNS AUTO: ABNORMAL /HPF
BASOPHILS # BLD AUTO: 0.01 X10(3)/MCL
BASOPHILS NFR BLD AUTO: 0.3 %
BILIRUB SERPL-MCNC: 0.5 MG/DL
BILIRUB UR QL STRIP.AUTO: NEGATIVE
BUN SERPL-MCNC: 7.5 MG/DL (ref 8.9–20.6)
CALCIUM SERPL-MCNC: 9.1 MG/DL (ref 8.4–10.2)
CHLORIDE SERPL-SCNC: 109 MMOL/L (ref 98–107)
CLARITY UR: CLEAR
CO2 SERPL-SCNC: 27 MMOL/L (ref 22–29)
COLOR UR AUTO: COLORLESS
CREAT SERPL-MCNC: 0.75 MG/DL (ref 0.72–1.25)
CREAT/UREA NIT SERPL: 10
EOSINOPHIL # BLD AUTO: 0.07 X10(3)/MCL (ref 0–0.9)
EOSINOPHIL NFR BLD AUTO: 2.2 %
ERYTHROCYTE [DISTWIDTH] IN BLOOD BY AUTOMATED COUNT: 13 % (ref 11.5–17)
GFR SERPLBLD CREATININE-BSD FMLA CKD-EPI: >60 ML/MIN/1.73/M2
GLOBULIN SER-MCNC: 2.6 GM/DL (ref 2.4–3.5)
GLUCOSE SERPL-MCNC: 78 MG/DL (ref 74–100)
GLUCOSE UR QL STRIP: NORMAL
HCT VFR BLD AUTO: 37.7 % (ref 42–52)
HGB BLD-MCNC: 13.2 G/DL (ref 14–18)
HGB UR QL STRIP: NEGATIVE
HOLD SPECIMEN: NORMAL
HYALINE CASTS #/AREA URNS LPF: ABNORMAL /LPF
IMM GRANULOCYTES # BLD AUTO: 0 X10(3)/MCL (ref 0–0.04)
IMM GRANULOCYTES NFR BLD AUTO: 0 %
KETONES UR QL STRIP: NEGATIVE
LEUKOCYTE ESTERASE UR QL STRIP: NEGATIVE
LIPASE SERPL-CCNC: 8 U/L
LYMPHOCYTES # BLD AUTO: 1.39 X10(3)/MCL (ref 0.6–4.6)
LYMPHOCYTES NFR BLD AUTO: 43.8 %
MCH RBC QN AUTO: 31.9 PG (ref 27–31)
MCHC RBC AUTO-ENTMCNC: 35 G/DL (ref 33–36)
MCV RBC AUTO: 91.1 FL (ref 80–94)
MONOCYTES # BLD AUTO: 0.24 X10(3)/MCL (ref 0.1–1.3)
MONOCYTES NFR BLD AUTO: 7.6 %
NEUTROPHILS # BLD AUTO: 1.46 X10(3)/MCL (ref 2.1–9.2)
NEUTROPHILS NFR BLD AUTO: 46.1 %
NITRITE UR QL STRIP: NEGATIVE
NRBC BLD AUTO-RTO: 0 %
OHS QRS DURATION: 84 MS
OHS QTC CALCULATION: 413 MS
PH UR STRIP: 6 [PH]
PLATELET # BLD AUTO: 155 X10(3)/MCL (ref 130–400)
PMV BLD AUTO: 11.2 FL (ref 7.4–10.4)
POTASSIUM SERPL-SCNC: 3.7 MMOL/L (ref 3.5–5.1)
PROT SERPL-MCNC: 6.8 GM/DL (ref 6.4–8.3)
PROT UR QL STRIP: NEGATIVE
RBC # BLD AUTO: 4.14 X10(6)/MCL (ref 4.7–6.1)
RBC #/AREA URNS AUTO: ABNORMAL /HPF
SODIUM SERPL-SCNC: 142 MMOL/L (ref 136–145)
SP GR UR STRIP.AUTO: 1 (ref 1–1.03)
SQUAMOUS #/AREA URNS LPF: ABNORMAL /HPF
TROPONIN I SERPL-MCNC: <0.01 NG/ML (ref 0–0.04)
UROBILINOGEN UR STRIP-ACNC: NORMAL
WBC # BLD AUTO: 3.17 X10(3)/MCL (ref 4.5–11.5)
WBC #/AREA URNS AUTO: ABNORMAL /HPF

## 2024-11-18 PROCEDURE — 99285 EMERGENCY DEPT VISIT HI MDM: CPT | Mod: 25

## 2024-11-18 PROCEDURE — 81001 URINALYSIS AUTO W/SCOPE: CPT | Performed by: PHYSICIAN ASSISTANT

## 2024-11-18 PROCEDURE — 96372 THER/PROPH/DIAG INJ SC/IM: CPT | Performed by: PHYSICIAN ASSISTANT

## 2024-11-18 PROCEDURE — 80053 COMPREHEN METABOLIC PANEL: CPT | Performed by: PHYSICIAN ASSISTANT

## 2024-11-18 PROCEDURE — 63600175 PHARM REV CODE 636 W HCPCS: Performed by: PHYSICIAN ASSISTANT

## 2024-11-18 PROCEDURE — 93005 ELECTROCARDIOGRAM TRACING: CPT

## 2024-11-18 PROCEDURE — 85025 COMPLETE CBC W/AUTO DIFF WBC: CPT | Performed by: PHYSICIAN ASSISTANT

## 2024-11-18 PROCEDURE — 83690 ASSAY OF LIPASE: CPT | Performed by: PHYSICIAN ASSISTANT

## 2024-11-18 PROCEDURE — 84484 ASSAY OF TROPONIN QUANT: CPT | Performed by: PHYSICIAN ASSISTANT

## 2024-11-18 RX ORDER — KETOROLAC TROMETHAMINE 30 MG/ML
15 INJECTION, SOLUTION INTRAMUSCULAR; INTRAVENOUS
Status: COMPLETED | OUTPATIENT
Start: 2024-11-18 | End: 2024-11-18

## 2024-11-18 RX ORDER — INDOMETHACIN 25 MG/1
25 CAPSULE ORAL 3 TIMES DAILY PRN
Qty: 15 CAPSULE | Refills: 0 | Status: SHIPPED | OUTPATIENT
Start: 2024-11-18

## 2024-11-18 RX ADMIN — KETOROLAC TROMETHAMINE 15 MG: 30 INJECTION, SOLUTION INTRAMUSCULAR; INTRAVENOUS at 09:11

## 2024-11-18 NOTE — Clinical Note
"Chun Patel" Amado was seen and treated in our emergency department on 11/18/2024.  He may return to work on 11/19/2024.       If you have any questions or concerns, please don't hesitate to call.      Marek WISE    "

## 2024-11-18 NOTE — ED PROVIDER NOTES
Encounter Date: 11/18/2024     History     Chief Complaint   Patient presents with    Flank Pain     Patient arrived by EMS, reporting left flank pain 5-6 days, denies injury     Patient with pmhx of seizure disorder and deafness presents today via EMS c/o left rib pain that started 5-6 days ago after picking up a heavy case of chicken at work. Patient says pain is constant. No exacerbating or relieving factors. He denies any other associated symptoms.     The history is provided by the patient. The history is limited by a language barrier. A  was used.     Review of patient's allergies indicates:  No Known Allergies  Past Medical History:   Diagnosis Date    Deaf     Hypertension     Migraines     Seizures      Past Surgical History:   Procedure Laterality Date    ABDOMINAL SURGERY      IMPLANTATION OF COCHLEAR PROSTHESIS      INNER EAR SURGERY Right      Family History   Problem Relation Name Age of Onset    Hypertension Mother       Social History     Tobacco Use    Smoking status: Every Day     Average packs/day: 1 pack/day for 5.0 years (5.0 ttl pk-yrs)     Types: Cigarettes     Start date: 2010    Smokeless tobacco: Never   Substance Use Topics    Alcohol use: Not Currently    Drug use: Never     Review of Systems   Constitutional:  Negative for chills and fever.   Respiratory:  Negative for cough, shortness of breath, wheezing and stridor.    Cardiovascular:  Negative for palpitations and leg swelling.        Left rib pain   Gastrointestinal:  Negative for abdominal pain, constipation, diarrhea, nausea and vomiting.   Genitourinary:  Negative for dysuria, flank pain and hematuria.   Musculoskeletal:  Negative for back pain and neck pain.   Skin:  Negative for rash.   Neurological:  Negative for syncope, light-headedness and headaches.   All other systems reviewed and are negative.      Physical Exam     Initial Vitals [11/18/24 0836]   BP Pulse Resp Temp SpO2   124/86 75 16 97.7 °F (36.5  °C) 98 %      MAP       --         Physical Exam    Vitals reviewed.  Constitutional: He is not diaphoretic. No distress.   HENT:   Head: Normocephalic and atraumatic. Mouth/Throat: Oropharynx is clear and moist. No oropharyngeal exudate.   Eyes: Conjunctivae and EOM are normal.   Neck: Neck supple.   Cardiovascular:  Normal rate, regular rhythm, normal heart sounds and intact distal pulses.           Pulmonary/Chest: Breath sounds normal. No respiratory distress. He has no wheezes. He has no rhonchi. He has no rales. He exhibits tenderness. He exhibits no mass, no laceration, no edema, no deformity, no swelling and no retraction.     Abdominal: Abdomen is soft. Bowel sounds are normal. He exhibits no distension. There is no abdominal tenderness. There is no rebound and no guarding.   Musculoskeletal:         General: No edema.      Cervical back: Neck supple.     Neurological: He is alert and oriented to person, place, and time. GCS score is 15. GCS eye subscore is 4. GCS verbal subscore is 5. GCS motor subscore is 6.   Skin: Skin is warm and dry. Capillary refill takes less than 2 seconds. No rash noted.   Psychiatric: He has a normal mood and affect.         ED Course   Procedures  Labs Reviewed   COMPREHENSIVE METABOLIC PANEL - Abnormal       Result Value    Sodium 142      Potassium 3.7      Chloride 109 (*)     CO2 27      Glucose 78      Blood Urea Nitrogen 7.5 (*)     Creatinine 0.75      Calcium 9.1      Protein Total 6.8      Albumin 4.2      Globulin 2.6      Albumin/Globulin Ratio 1.6      Bilirubin Total 0.5      ALP 56      ALT 11      AST 20      eGFR >60      Anion Gap 6.0      BUN/Creatinine Ratio 10     URINALYSIS, REFLEX TO URINE CULTURE - Abnormal    Color, UA Colorless (*)     Appearance, UA Clear      Specific Gravity, UA 1.005      pH, UA 6.0      Protein, UA Negative      Glucose, UA Normal      Ketones, UA Negative      Blood, UA Negative      Bilirubin, UA Negative      Urobilinogen, UA  Normal      Nitrites, UA Negative      Leukocyte Esterase, UA Negative      RBC, UA None Seen      WBC, UA None Seen      Bacteria, UA None Seen      Squamous Epithelial Cells, UA None Seen      Hyaline Casts, UA None Seen     CBC WITH DIFFERENTIAL - Abnormal    WBC 3.17 (*)     RBC 4.14 (*)     Hgb 13.2 (*)     Hct 37.7 (*)     MCV 91.1      MCH 31.9 (*)     MCHC 35.0      RDW 13.0      Platelet 155      MPV 11.2 (*)     Neut % 46.1      Lymph % 43.8      Mono % 7.6      Eos % 2.2      Basophil % 0.3      Lymph # 1.39      Neut # 1.46 (*)     Mono # 0.24      Eos # 0.07      Baso # 0.01      IG# 0.00      IG% 0.0      NRBC% 0.0     LIPASE - Normal    Lipase Level 8     TROPONIN I - Normal    Troponin-I <0.010     CBC W/ AUTO DIFFERENTIAL    Narrative:     The following orders were created for panel order CBC Auto Differential.  Procedure                               Abnormality         Status                     ---------                               -----------         ------                     CBC with Differential[[id number]]       Abnormal            Final result                 Please view results for these tests on the individual orders.   EXTRA TUBES    Narrative:     The following orders were created for panel order EXTRA TUBES.  Procedure                               Abnormality         Status                     ---------                               -----------         ------                     Light Blue Top Hold[[id number]]                             Final result               Lavender Top Hold[[id number]]                               Final result               Gold Top Hold[[id number]]                                   Final result               Pink Top Hold[[id number]]                                   Final result                 Please view results for these tests on the individual orders.   LIGHT BLUE TOP HOLD    Extra Tube Hold for add-ons.     LAVENDER TOP HOLD    Extra Tube Hold for  add-ons.     GOLD TOP HOLD    Extra Tube Hold for add-ons.     PINK TOP HOLD    Extra Tube Hold for add-ons.       EKG Readings: (Independently Interpreted)   Initial Reading: No STEMI. Rhythm: Normal Sinus Rhythm. Heart Rate: 63. Ectopy: No Ectopy. Conduction: Normal. ST Segments: Normal ST Segments. T Waves: Normal. Axis: Normal.     ECG Results              EKG 12-lead (In process)        Collection Time Result Time QRS Duration [facility] QTC Calculation    11/18/24 09:23:46 11/18/24 09:41:46 84 413                     In process by Interface, Lab In [facility] (11/18/24 09:41:53)                   Narrative:    Test Reason : R07.9,    Vent. Rate :  63 BPM     Atrial Rate :  63 BPM     P-R Int : 152 ms          QRS Dur :  84 ms      QT Int : 404 ms       P-R-T Axes :  64  48  42 degrees    QTcB Int : 413 ms    Normal sinus rhythm  Normal ECG  When compared with ECG of 20-Sep-2024 09:52,  No significant change was found    Referred By:            Confirmed By:                                   Imaging Results              X-Ray Chest PA And Lateral (Final result)  Result time 11/18/24 10:25:29      Final result by Cosme Edge MD (11/18/24 10:25:29)                   Impression:      No acute cardiopulmonary abnormality.      Electronically signed by: Cosme Edge  Date:    11/18/2024  Time:    10:25               Narrative:    EXAMINATION:  XR CHEST PA AND LATERAL    CLINICAL HISTORY:  left rib pain;    COMPARISON:  20 September 2024    FINDINGS:  PA and lateral views of the chest were obtained. Heart and mediastinum within normal limits. The lungs are clear. No pneumothorax or significant effusion.                                       Medications   ketorolac injection 15 mg (15 mg Intramuscular Given 11/18/24 0934)     Medical Decision Making  Ddx includes but is not limited to rib contusion, costochondritis, pleurisy, pneumonia, pneumothorax, acute coronary syndrome, pe, amongst others    Amount and/or Complexity  of Data Reviewed  External Data Reviewed: notes.  Labs: ordered. Decision-making details documented in ED Course.  Radiology: ordered. Decision-making details documented in ED Course.    Risk  Prescription drug management.  Risk Details: Given strict ED return precautions. I have spoken with the patient and/or caregivers. I have explained the patient's condition, diagnoses and treatment plan based on the information available to me at this time. I have answered the patient's and/or caregiver's questions and addressed any concerns. The patient and/or caregivers have as good an understanding of the patient's diagnosis, condition and treatment plan as can be expected at this point. The vital signs have been stable. The patient's condition is stable and appropriate for discharge from the emergency department.      The patient will pursue further outpatient evaluation with the primary care physician or other designated or consulting physician as outlined in the discharge instructions. The patient and/or caregivers are agreeable to this plan of care and follow-up instructions have been explained in detail. The patient and/or caregivers have received these instructions in written format and have expressed an understanding of the discharge instructions. The patient and/or caregivers are aware that any significant change in condition or worsening of symptoms should prompt an immediate return to this or the closest emergency department or a call to 911              Additional MDM:   PERC Rule:   Age is greater than or equal to 50 = 0.0  Heart Rate is greater than or equal to 100 = 0.0  SaO2 on room air < 95% = 0.0  Unilateral leg swelling = 0.0  Hemoptysis = 0.0  Recent surgery or trauma = 0.0  Prior PE or DVT =  0.0  Hormone use = 0.00  PERC Score = 0        Well's Criteria Score:  -Clinical symptoms of DVT (leg swelling, pain with palpation) = 0.0  -PE is #1 diagnosis OR equally likely =            0.0  -Heart Rate >100 =    0.0  -Immobilization (= or > than 3 days) or surgery in the previous 4 weeks = 0.0  -Previous DVT/PE = 0.0  -Hemoptysis =          0.0  -Malignancy =           0.0  Well's Probability Score =    0              ED Course as of 11/18/24 1128   Mon Nov 18, 2024   1108 WBC(!): 3.17 [SA]   1108 Hemoglobin(!): 13.2 [SA]   1108 Hematocrit(!): 37.7 [SA]   1108 Platelet Count: 155 [SA]   1108 Lipase: 8 [SA]   1108 Troponin I: <0.010 [SA]   1108 BUN(!): 7.5 [SA]   1108 Creatinine: 0.75 [SA]   1109 BILIRUBIN TOTAL: 0.5 [SA]   1109 ALP: 56 [SA]   1109 ALT: 11 [SA]   1109 AST: 20 [SA]   1109 NITRITE UA: Negative [SA]   1109 Leukocyte Esterase, UA: Negative [SA]   1109 RBC, UA: None Seen [SA]   1109 WBC, UA: None Seen [SA]   1109 Bacteria, UA: None Seen [SA]   1128 X-Ray Chest PA And Lateral [SA]      ED Course User Index  [SA] Whitney Martines PA                     Clinical Impression:  Final diagnoses:  [R07.89] Chest wall pain (Primary)      ED Disposition Condition    Discharge Stable          ED Prescriptions       Medication Sig Dispense Start Date End Date Auth. Provider    indomethacin (INDOCIN) 25 MG capsule Take 1 capsule (25 mg total) by mouth 3 (three) times daily as needed (pain). 15 capsule 11/18/2024 -- Whitney Martines PA          Follow-up Information       Follow up With Specialties Details Why Contact Info    [facility] Emergency Medicine  If symptoms worsen return to ED immediately [address] [facility] [id number] [phone]    Solo Robison, DO Internal Medicine In 2 days  [address] [facility] [address]  [phone]               Whitney Martines PA  11/18/24 2555

## 2024-11-21 ENCOUNTER — OFFICE VISIT (OUTPATIENT)
Dept: INTERNAL MEDICINE | Facility: CLINIC | Age: 34
End: 2024-11-21
Payer: COMMERCIAL

## 2024-11-21 VITALS
WEIGHT: 148.19 LBS | HEIGHT: 69 IN | BODY MASS INDEX: 21.95 KG/M2 | TEMPERATURE: 98 F | OXYGEN SATURATION: 100 % | DIASTOLIC BLOOD PRESSURE: 82 MMHG | RESPIRATION RATE: 20 BRPM | SYSTOLIC BLOOD PRESSURE: 129 MMHG | HEART RATE: 76 BPM

## 2024-11-21 DIAGNOSIS — F25.9 SCHIZOAFFECTIVE DISORDER, UNSPECIFIED TYPE: ICD-10-CM

## 2024-11-21 DIAGNOSIS — S23.41XA SPRAIN OF COSTAL CARTILAGE, INITIAL ENCOUNTER: Primary | ICD-10-CM

## 2024-11-21 PROCEDURE — 99214 OFFICE O/P EST MOD 30 MIN: CPT | Mod: PBBFAC

## 2024-11-21 RX ORDER — METHOCARBAMOL 500 MG/1
500 TABLET, FILM COATED ORAL 3 TIMES DAILY
Qty: 30 TABLET | Refills: 0 | Status: SHIPPED | OUTPATIENT
Start: 2024-11-21 | End: 2024-12-01

## 2024-11-21 RX ORDER — ARIPIPRAZOLE 5 MG/1
5 TABLET ORAL DAILY
Qty: 30 TABLET | Refills: 11 | Status: SHIPPED | OUTPATIENT
Start: 2024-11-21 | End: 2025-11-21

## 2024-11-21 NOTE — PROGRESS NOTES
[facility] INTERNAL MEDICINE  OUTPATIENT OFFICE VISIT NOTE    SUBJECTIVE:      Chief Complaint: Follow-up (ED for Side pain)       HPI: Chun Fischer is a 34 y.o. yo male w/ PMH of  has a past medical history of Deaf, epilepsy?  Schizoaffective disorder per chart review, chronic oral pain., who presents for 2 month(s) follow up. Reports injury at work with lifting heavy boxes causing on the left side room.  Denies any falls or trauma.  Seen in ED on 11/18/2020 and diagnosed with chest wall pain and given indomethacin 25 mg t.i.d. p.r.n..  States pain minimally improved.     Patient seems frustrated regarding interpersonal issues with managers at work and coworkers.  Again revealing complaints of people making fun of him because of his deafness.  Also having interpersonal issues with family.  States he has been told he seems angry but states he has not.  Has been not compliant with Abilify.  Advised patient to resume Abilify and follow up with Psychiatry.  Previously referred to behavioral health for counseling, but apparently patient has not had appointment with them yet.  We will place new referral again today.  Compliant with Depakote in denying any recent seizure activity.  Strongly advised following up with Neurology as scheduled, next appointment 01/07/2025.       Past Medical History:   has a past medical history of Deaf, Hypertension, Migraines, and Seizures.     Past Surgical History:   has a past surgical history that includes Inner ear surgery (Right); Implantation of cochlear prosthesis; and Abdominal surgery.     Family History:  family history includes Hypertension in his mother.     Social History:   reports that he has been smoking cigarettes. He started smoking about 14 years ago. He has a 5 pack-year smoking history. He has never used smokeless tobacco. He reports that he does not currently use alcohol. He reports that he does not use drugs.     Allergies:  has No Known Allergies.     Home  "Medications:  Current Outpatient Medications   Medication Instructions    ARIPiprazole (ABILIFY) 5 mg, Oral, Daily    divalproex ER (DEPAKOTE ER) 1,000 mg, Oral, Nightly    famotidine (PEPCID) 20 mg, Oral, 2 times daily    indomethacin (INDOCIN) 25 mg, Oral, 3 times daily PRN    ketotifen (ALLERGY EYE, KETOTIFEN,) 0.025 % (0.035 %) ophthalmic solution 1 drop, Both Eyes, 2 times daily    methocarbamoL (ROBAXIN) 500 mg, Oral, 3 times daily    ondansetron (ZOFRAN-ODT) 4 mg, Oral, Every 6 hours PRN    white petrolatum-mineral oiL (ARTIFICIAL TEARS, ROSALBA/MIN,) 83-15 % Oint Both Eyes, Nightly        ROS:  Review of Systems   Constitutional:  Negative for chills and fever.   HENT:  Negative for ear pain.    Respiratory:  Negative for cough and shortness of breath.    Cardiovascular:  Negative for chest pain.   Gastrointestinal:  Negative for abdominal pain, heartburn, nausea and vomiting.   Musculoskeletal:         Left-sided chest wall pain   Neurological:  Negative for seizures and headaches.   Psychiatric/Behavioral:  Negative for depression and suicidal ideas.           OBJECTIVE:     Vital signs:   /82 (BP Location: Left arm, Patient Position: Sitting)   Pulse 76   Temp 98.2 °F (36.8 °C) (Oral)   Resp 20   Ht 5' 9" (1.753 m)   Wt 67.2 kg (148 lb 3.2 oz)   SpO2 100%   BMI 21.89 kg/m²      Physical Examination:  General: Well nourished w/o distress  Neck: Full ROM; no lymphadenopathy  Pulm: CTA bilaterally, normal work of breathing  CV: S1, S2 w/o murmurs or gallops; no edema noted  GI: Soft with normal bowel sounds in all quadrants, no masses on palpation  MSK: Full ROM of all extremities and spine w/o limitation or discomfort.  Left-sided tenderness to palpation at left inferior chest/upper left upper quadrant abdominal area.  Derm: No rashes, abnormal bruising, or skin lesions noted at chest area.  Noted area of darkening skin in left flank consistent with birthmark  Neuro: AAOx4; CN II-XII intact; " motor/sensory function intact      Labs:  CMP:   Recent Labs   Lab 09/20/24  0934 09/28/24  1812 10/27/24  1358 11/18/24  0931   Sodium 141 141 142 142   Potassium 3.5 3.9 3.1 L 3.7   CO2 28 26 23 27   Blood Urea Nitrogen 6.0 L 9.0 8.5 L 7.5 L   Creatinine 0.78 0.79 0.80 0.75   Glucose 82 101 H 90 78   Calcium 9.5 9.7 8.9 9.1   Albumin 4.1  --  4.0 4.2   Bilirubin Total 0.6  --  0.5 0.5   AST 18  --  20 20   ALT 14  --  12 11   ALP 54  --  47 56     CBC:   Recent Labs   Lab 09/28/24  1812 10/27/24  1358 11/18/24  0931   WBC 4.59 3.22 L 3.17 L   Neut # 1.85 L 1.15 L 1.46 L   RBC 4.41 L 3.82 L 4.14 L   Hgb 13.6 L 12.1 L 13.2 L   Hct 40.1 L 34.9 L 37.7 L   Platelet 193 144 155   MCV 90.9 91.4 91.1   RDW 13.5 13.1 13.0     FLP:         DM:   Recent Labs   Lab 09/28/24  1812 10/27/24  1358 11/18/24  0931   Creatinine 0.79 0.80 0.75     Thyroid:   Recent Labs   Lab 08/20/22  1704 10/07/23  0753 09/20/24  0934   TSH 0.9325 1.163 0.741     Anemia:   Recent Labs   Lab 11/18/24  0931   Hgb 13.2 L   Hct 37.7 L           ASSESSMENT & PLAN:      Left-sided chest wall pain  - likely muscle strain  - Robaxin 500 mg t.i.d. p.r.n.  - continue p.r.n. 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   - s/p tooth extraction w/ [facility]   - medical release form signed to obtain records  - dentures to be done in upcoming months  - ibuprofen p.r.n.    Tobacco use disorder  - 1 pack/day since 22 y/o (8 pack  year)  - encouraged reducing tobacco use - has cut down   - taking Rx nicotine patches and gum as prescribed    ALEA   - referred to sleep medicine previously  - pt deferring      Health Maintenance  Vaccinations  Immunization History   Administered Date(s) Administered    DTP 1989, 1989, 1989, 11/29/1990    HIB 11/01/1990    Hepatitis A, Adult 03/28/2008, 09/30/2008    Influenza - Quadrivalent - PF *Preferred* (6 months and older) 01/17/2024    Influenza - Trivalent - Fluarix, Flulaval, Fluzone, Afluria - PF 12/10/2020, 09/26/2024    MMR 08/10/1990    OPV 1989, 1989, 11/01/1990    Pneumococcal Conjugate - 13 Valent 07/13/2016    Td (ADULT) 01/17/2014    Td (Adult), Unspecified Formulation 02/28/2000    Td - PF (ADULT) 04/28/2015    Tdap 01/12/2014, 11/13/2014, 09/05/2016, 02/24/2017, 07/17/2021, 12/30/2021, 02/01/2022       -Flu:  Done 1/71/24     -Pneumonia: will address at next visit    Screening   -Lung Ca. Screening:  Not of age   -Colon Ca. Screening:  Not of age   -Hep C, HIV:  Ordered 05/08/2024-pending  Social   -EtOH:  reports that he does not currently use alcohol.   -Tobacco:  reports that he has been smoking cigarettes. He started smoking about 14 years ago. He has a 5 pack-year smoking history. He has never used smokeless tobacco.   -Drugs:  reports no history of drug use.    -Depression:   Depression: Low Risk  (11/21/2024)    Depression     Last PHQ-4: Flowsheet Data: 2          Return to clinic in 4 month(s).    Solo Robison DO  [facility] PGY-3    Orders Placed This Encounter    Ambulatory referral/consult to Behavioral Health    methocarbamoL (ROBAXIN) 500 MG Tab    ARIPiprazole (ABILIFY) 5 MG Tab

## 2025-01-03 NOTE — PROGRESS NOTES
[facility] Neurology Follow Up Office Visit Note    Initial Visit Date: 8/27/2024  Last Visit Date: 8/27/2024  Current Visit Date:  01/07/2025    Chief Complaint:     Chief Complaint   Patient presents with    Seizures     Patient states that his medicine is making him really tired. He states he has been depressed a lot more lately.        History of Present Illness:      Sign language interpretor on site.     This is 34 y.o. right hand dominant male with history of bipolar disorder, ALEA who is referred for right temporal focal epilepsy. During last visit, depakote 500 mg at bedtime was started. Remains non compliant.     Age of Onset : Childhood     Semiology: behavioral arrest followed by GTC of unknown duration with post ictal confusion.     Frequency: last event 10/27/2024    Provocation Factors: overheating, stress. Poor compliance.     Risk Factors  - Family history of seizure disorders:  No  - History of focal CNS lesions: No  - History of CNS infections: No  - History head trauma with prolonged LOC: Unknown  - History of childhood seizures, including febrile seizures: Yes as above   - History of development delay: Unknown   - History of underlying mood disorder: Yes bipolar disorder    - History of sleep disorder: Yes ALEA  - Recreational drug use: No  - Alcohol use: No  - Family planning and contraceptive use: Not Applicable     Medications:     Current Anti-Seizure Medication(s):  Deapkote 500 mg at bedtime: was given 1000 mg at bedtime by ER. Last filled on 9/30/2024.    Prior Anti-Seizure Medication(s):  Depakote 1000 mg at bedtime: not taking.    Surgical Intervention & Devices:     - VNS:  - DBS  - RNS:  - Lobectomy:    Labs:     Results for orders placed or performed during the hospital encounter of 11/18/24   EKG 12-lead    Collection Time: 11/18/24  9:23 AM   Result Value Ref Range    QRS Duration 84 ms    [facility] QTC Calculation 413 ms   Urinalysis, Reflex to Urine Culture    Collection Time: 11/18/24  9:24  AM    Specimen: Urine   Result Value Ref Range    Color, UA Colorless (A) Yellow, Light-Yellow, Dark Yellow, Abigail, Straw    Appearance, UA Clear Clear    Specific Gravity, UA 1.005 1.005 - 1.030    pH, UA 6.0 5.0 - 8.5    Protein, UA Negative Negative    Glucose, UA Normal Negative, Normal    Ketones, UA Negative Negative    Blood, UA Negative Negative    Bilirubin, UA Negative Negative    Urobilinogen, UA Normal 0.2, 1.0, Normal    Nitrites, UA Negative Negative    Leukocyte Esterase, UA Negative Negative    RBC, UA None Seen None Seen, 0-2, 3-5, 0-5 /HPF    WBC, UA None Seen None Seen, 0-2, 3-5, 0-5 /HPF    Bacteria, UA None Seen None Seen /HPF    Squamous Epithelial Cells, UA None Seen None Seen /HPF    Hyaline Casts, UA None Seen None Seen /lpf   Comprehensive Metabolic Panel    Collection Time: 11/18/24  9:31 AM   Result Value Ref Range    Sodium 142 136 - 145 mmol/L    Potassium 3.7 3.5 - 5.1 mmol/L    Chloride 109 (H) 98 - 107 mmol/L    CO2 27 22 - 29 mmol/L    Glucose 78 74 - 100 mg/dL    Blood Urea Nitrogen 7.5 (L) 8.9 - 20.6 mg/dL    Creatinine 0.75 0.72 - 1.25 mg/dL    Calcium 9.1 8.4 - 10.2 mg/dL    Protein Total 6.8 6.4 - 8.3 gm/dL    Albumin 4.2 3.5 - 5.0 g/dL    Globulin 2.6 2.4 - 3.5 gm/dL    Albumin/Globulin Ratio 1.6 1.1 - 2.0 ratio    Bilirubin Total 0.5 <=1.5 mg/dL    ALP 56 40 - 150 unit/L    ALT 11 0 - 55 unit/L    AST 20 5 - 34 unit/L    eGFR >60 mL/min/1.73/m2    Anion Gap 6.0 mEq/L    BUN/Creatinine Ratio 10    Lipase    Collection Time: 11/18/24  9:31 AM   Result Value Ref Range    Lipase Level 8 <=60 U/L   Troponin I    Collection Time: 11/18/24  9:31 AM   Result Value Ref Range    Troponin-I <0.010 0.000 - 0.045 ng/mL   CBC with Differential    Collection Time: 11/18/24  9:31 AM   Result Value Ref Range    WBC 3.17 (L) 4.50 - 11.50 x10(3)/mcL    RBC 4.14 (L) 4.70 - 6.10 x10(6)/mcL    Hgb 13.2 (L) 14.0 - 18.0 g/dL    Hct 37.7 (L) 42.0 - 52.0 %    MCV 91.1 80.0 - 94.0 fL    MCH 31.9 (H)  27.0 - 31.0 pg    MCHC 35.0 33.0 - 36.0 g/dL    RDW 13.0 11.5 - 17.0 %    Platelet 155 130 - 400 x10(3)/mcL    MPV 11.2 (H) 7.4 - 10.4 fL    Neut % 46.1 %    Lymph % 43.8 %    Mono % 7.6 %    Eos % 2.2 %    Basophil % 0.3 %    Lymph # 1.39 0.6 - 4.6 x10(3)/mcL    Neut # 1.46 (L) 2.1 - 9.2 x10(3)/mcL    Mono # 0.24 0.1 - 1.3 x10(3)/mcL    Eos # 0.07 0 - 0.9 x10(3)/mcL    Baso # 0.01 <=0.2 x10(3)/mcL    IG# 0.00 0 - 0.04 x10(3)/mcL    IG% 0.0 %    NRBC% 0.0 %   Light Blue Top Hold    Collection Time: 11/18/24  9:51 AM   Result Value Ref Range    Extra Tube Hold for add-ons.    Lavender Top Hold    Collection Time: 11/18/24  9:51 AM   Result Value Ref Range    Extra Tube Hold for add-ons.    Gold Top Hold    Collection Time: 11/18/24  9:51 AM   Result Value Ref Range    Extra Tube Hold for add-ons.    Pink Top Hold    Collection Time: 11/18/24  9:51 AM   Result Value Ref Range    Extra Tube Hold for add-ons.        Studies:      - routine EEG 1/31/2024: This is an abnormal routine awake and asleep EEG with right posterior temporal epileptiform discharge.   - one hour EEG:   - 24 hour EEG:  - Ambulatory EEG:  - EMU Video EEG:  - MRI Brain:   - NCHCT:  - WADA:    Review of Systems:     Review of Systems   All other systems reviewed and are negative.      Physical Exams:     Vitals:    01/07/25 1313   BP: 135/82   Pulse: 79   Resp: 18   Temp: 97.9 °F (36.6 °C)         Physical Exam  Vitals and nursing note reviewed.   Constitutional:       Appearance: Normal appearance.   HENT:      Head: Normocephalic and atraumatic.      Nose: Nose normal.      Mouth/Throat:      Mouth: Mucous membranes are moist.      Pharynx: Oropharynx is clear.   Eyes:      Conjunctiva/sclera: Conjunctivae normal.   Cardiovascular:      Rate and Rhythm: Normal rate and regular rhythm.      Pulses: Normal pulses.      Heart sounds: Normal heart sounds.   Pulmonary:      Effort: Pulmonary effort is normal.      Breath sounds: Normal breath sounds.    Abdominal:      General: Abdomen is flat.      Palpations: Abdomen is soft.   Musculoskeletal:         General: Normal range of motion.      Cervical back: Normal range of motion.   Neurological:      Mental Status: He is alert.   Psychiatric:         Mood and Affect: Mood normal.         Comprehensive Neurological Exam:  Mental Status: Alert Oriented to Self, Date, and Place.  Comprehension wnl. N  CN II - XII: NICOLE, No APD, VFFC, No ptosis OU, EOMI without nystagmus, LT/Temp symmetric in CN V1-3 distribution, Hearing impaired, Face Symmetric, Tongue and Uvula midline, Trapezius symmetric bilateral.   Motor: tone and bulk wnl throughout, no abnormal involuntary or voluntary movements, 5/5 to confrontation, Fine finger movements wnl b/l, No pronator drift.   Sensory: LT, Proprioception, Vibration, PP, Temp symmetric.   Reflexes: 2+ throughout  Cerebellar: FNF wnl b/l, RAHM wnl b/l  Gait: normal.     Assessment:     This is 34 y.o. right hand dominant male with history of bipolar disorder, ALEA who is referred for right temporal focal epilepsy, non compliant.    Problem List Items Addressed This Visit          Neuro    Localization-related (focal) (partial) idiopathic epilepsy and epileptic syndromes with seizures of localized onset, not intractable, without status epilepticus    Relevant Medications    divalproex ER (DEPAKOTE ER) 500 MG Tb24 24 hr tablet    Other Relevant Orders    Comprehensive metabolic panel    CBC auto differential    Valproic Acid         Plan:     [] increase Depakote to 1000 mg at bedtime   [] CBC, CMP, VPA random level today     RTC 4 Months      Visit today is associated with current or anticipated ongoing medical care related to this patient's single serious condition/complex condition as documented above.     Seizure education provided: including family planning and teratogenicity of AEDs, risk of uncontrolled seizure disorder, SUDEP. No driving until seizure free for six months ([place]  state law). No swimming, climbing heights, or operate heavy machinery without supervision. Patient is advised to avoid Benadryl, Tramadol, Wellbutrin, flashing lights, alcohol, sleep deprivation, and certain anti-biotics that can lower seizure threshold.     I have explained the treatment plan, diagnosis, and prognosis to patient. All questions are answered to the best of my knowledge.     Face to face time 30 minutes, including documentation, chart review, counseling, education, review of test results, relevant medical records, and coordination of care.   I have explained the treatment plan, diagnosis, and prognosis to patient. All questions are answered to the best of my knowledge.         Carmen Wynne MD   General Neurology  01/07/2025

## 2025-01-07 ENCOUNTER — OFFICE VISIT (OUTPATIENT)
Dept: NEUROLOGY | Facility: CLINIC | Age: 35
End: 2025-01-07
Payer: COMMERCIAL

## 2025-01-07 ENCOUNTER — LAB VISIT (OUTPATIENT)
Dept: LAB | Facility: HOSPITAL | Age: 35
End: 2025-01-07
Attending: PSYCHIATRY & NEUROLOGY
Payer: COMMERCIAL

## 2025-01-07 VITALS
RESPIRATION RATE: 18 BRPM | OXYGEN SATURATION: 99 % | DIASTOLIC BLOOD PRESSURE: 82 MMHG | SYSTOLIC BLOOD PRESSURE: 135 MMHG | HEART RATE: 79 BPM | BODY MASS INDEX: 22.9 KG/M2 | WEIGHT: 154.63 LBS | TEMPERATURE: 98 F | HEIGHT: 69 IN

## 2025-01-07 DIAGNOSIS — G40.009 LOCALIZATION-RELATED (FOCAL) (PARTIAL) IDIOPATHIC EPILEPSY AND EPILEPTIC SYNDROMES WITH SEIZURES OF LOCALIZED ONSET, NOT INTRACTABLE, WITHOUT STATUS EPILEPTICUS: ICD-10-CM

## 2025-01-07 LAB
ALBUMIN SERPL-MCNC: 4.2 G/DL (ref 3.5–5)
ALBUMIN/GLOB SERPL: 1.4 RATIO (ref 1.1–2)
ALP SERPL-CCNC: 55 UNIT/L (ref 40–150)
ALT SERPL-CCNC: 17 UNIT/L (ref 0–55)
ANION GAP SERPL CALC-SCNC: 3 MEQ/L
AST SERPL-CCNC: 25 UNIT/L (ref 5–34)
BASOPHILS # BLD AUTO: 0.01 X10(3)/MCL
BASOPHILS NFR BLD AUTO: 0.3 %
BILIRUB SERPL-MCNC: 0.4 MG/DL
BUN SERPL-MCNC: 8.6 MG/DL (ref 8.9–20.6)
CALCIUM SERPL-MCNC: 9.3 MG/DL (ref 8.4–10.2)
CHLORIDE SERPL-SCNC: 107 MMOL/L (ref 98–107)
CO2 SERPL-SCNC: 30 MMOL/L (ref 22–29)
CREAT SERPL-MCNC: 0.83 MG/DL (ref 0.72–1.25)
CREAT/UREA NIT SERPL: 10
EOSINOPHIL # BLD AUTO: 0.07 X10(3)/MCL (ref 0–0.9)
EOSINOPHIL NFR BLD AUTO: 1.9 %
ERYTHROCYTE [DISTWIDTH] IN BLOOD BY AUTOMATED COUNT: 13.2 % (ref 11.5–17)
GFR SERPLBLD CREATININE-BSD FMLA CKD-EPI: >60 ML/MIN/1.73/M2
GLOBULIN SER-MCNC: 3 GM/DL (ref 2.4–3.5)
GLUCOSE SERPL-MCNC: 73 MG/DL (ref 74–100)
HCT VFR BLD AUTO: 39.1 % (ref 42–52)
HGB BLD-MCNC: 13.3 G/DL (ref 14–18)
IMM GRANULOCYTES # BLD AUTO: 0.01 X10(3)/MCL (ref 0–0.04)
IMM GRANULOCYTES NFR BLD AUTO: 0.3 %
LYMPHOCYTES # BLD AUTO: 1.83 X10(3)/MCL (ref 0.6–4.6)
LYMPHOCYTES NFR BLD AUTO: 48.7 %
MCH RBC QN AUTO: 31.7 PG (ref 27–31)
MCHC RBC AUTO-ENTMCNC: 34 G/DL (ref 33–36)
MCV RBC AUTO: 93.1 FL (ref 80–94)
MONOCYTES # BLD AUTO: 0.23 X10(3)/MCL (ref 0.1–1.3)
MONOCYTES NFR BLD AUTO: 6.1 %
NEUTROPHILS # BLD AUTO: 1.61 X10(3)/MCL (ref 2.1–9.2)
NEUTROPHILS NFR BLD AUTO: 42.7 %
NRBC BLD AUTO-RTO: 0 %
PLATELET # BLD AUTO: 175 X10(3)/MCL (ref 130–400)
PMV BLD AUTO: 10.4 FL (ref 7.4–10.4)
POTASSIUM SERPL-SCNC: 3.8 MMOL/L (ref 3.5–5.1)
PROT SERPL-MCNC: 7.2 GM/DL (ref 6.4–8.3)
RBC # BLD AUTO: 4.2 X10(6)/MCL (ref 4.7–6.1)
SODIUM SERPL-SCNC: 140 MMOL/L (ref 136–145)
VALPROATE SERPL-MCNC: <12.5 UG/ML (ref 50–100)
WBC # BLD AUTO: 3.76 X10(3)/MCL (ref 4.5–11.5)

## 2025-01-07 PROCEDURE — 99214 OFFICE O/P EST MOD 30 MIN: CPT | Mod: S$PBB,,, | Performed by: PSYCHIATRY & NEUROLOGY

## 2025-01-07 PROCEDURE — 80053 COMPREHEN METABOLIC PANEL: CPT

## 2025-01-07 PROCEDURE — 3075F SYST BP GE 130 - 139MM HG: CPT | Mod: CPTII,,, | Performed by: PSYCHIATRY & NEUROLOGY

## 2025-01-07 PROCEDURE — 1159F MED LIST DOCD IN RCRD: CPT | Mod: CPTII,,, | Performed by: PSYCHIATRY & NEUROLOGY

## 2025-01-07 PROCEDURE — 36415 COLL VENOUS BLD VENIPUNCTURE: CPT

## 2025-01-07 PROCEDURE — 99213 OFFICE O/P EST LOW 20 MIN: CPT | Mod: PBBFAC | Performed by: PSYCHIATRY & NEUROLOGY

## 2025-01-07 PROCEDURE — 3079F DIAST BP 80-89 MM HG: CPT | Mod: CPTII,,, | Performed by: PSYCHIATRY & NEUROLOGY

## 2025-01-07 PROCEDURE — 80164 ASSAY DIPROPYLACETIC ACD TOT: CPT

## 2025-01-07 PROCEDURE — 85025 COMPLETE CBC W/AUTO DIFF WBC: CPT

## 2025-01-07 PROCEDURE — 3008F BODY MASS INDEX DOCD: CPT | Mod: CPTII,,, | Performed by: PSYCHIATRY & NEUROLOGY

## 2025-01-07 PROCEDURE — G2211 COMPLEX E/M VISIT ADD ON: HCPCS | Mod: S$PBB,,, | Performed by: PSYCHIATRY & NEUROLOGY

## 2025-01-07 RX ORDER — DIVALPROEX SODIUM 500 MG/1
1000 TABLET, FILM COATED, EXTENDED RELEASE ORAL NIGHTLY
Qty: 180 TABLET | Refills: 1 | Status: SHIPPED | OUTPATIENT
Start: 2025-01-07 | End: 2025-07-06

## 2025-01-13 ENCOUNTER — PROCEDURE VISIT (OUTPATIENT)
Dept: SLEEP MEDICINE | Facility: HOSPITAL | Age: 35
End: 2025-01-13
Payer: COMMERCIAL

## 2025-01-13 DIAGNOSIS — G47.33 OSA (OBSTRUCTIVE SLEEP APNEA): Primary | ICD-10-CM

## 2025-01-13 PROCEDURE — 95810 POLYSOM 6/> YRS 4/> PARAM: CPT

## 2025-02-03 ENCOUNTER — HOSPITAL ENCOUNTER (EMERGENCY)
Facility: HOSPITAL | Age: 35
Discharge: HOME | End: 2025-02-03
Attending: FAMILY MEDICINE
Payer: COMMERCIAL

## 2025-02-03 VITALS
HEART RATE: 71 BPM | TEMPERATURE: 97 F | BODY MASS INDEX: 23.63 KG/M2 | WEIGHT: 160 LBS | OXYGEN SATURATION: 100 % | SYSTOLIC BLOOD PRESSURE: 117 MMHG | DIASTOLIC BLOOD PRESSURE: 85 MMHG | RESPIRATION RATE: 19 BRPM

## 2025-02-03 DIAGNOSIS — G40.909 RECURRENT SEIZURES: Primary | ICD-10-CM

## 2025-02-03 LAB
ALBUMIN SERPL-MCNC: 3.7 G/DL (ref 3.5–5)
ALBUMIN/GLOB SERPL: 1.2 RATIO (ref 1.1–2)
ALP SERPL-CCNC: 47 UNIT/L (ref 40–150)
ALT SERPL-CCNC: 16 UNIT/L (ref 0–55)
ANION GAP SERPL CALC-SCNC: 8 MEQ/L
AST SERPL-CCNC: 21 UNIT/L (ref 5–34)
BASOPHILS # BLD AUTO: 0.01 X10(3)/MCL
BASOPHILS NFR BLD AUTO: 0.2 %
BILIRUB SERPL-MCNC: 0.5 MG/DL
BUN SERPL-MCNC: 13.1 MG/DL (ref 8.9–20.6)
CALCIUM SERPL-MCNC: 9.1 MG/DL (ref 8.4–10.2)
CHLORIDE SERPL-SCNC: 105 MMOL/L (ref 98–107)
CO2 SERPL-SCNC: 27 MMOL/L (ref 22–29)
CREAT SERPL-MCNC: 0.77 MG/DL (ref 0.72–1.25)
CREAT/UREA NIT SERPL: 17
EOSINOPHIL # BLD AUTO: 0.06 X10(3)/MCL (ref 0–0.9)
EOSINOPHIL NFR BLD AUTO: 1.2 %
ERYTHROCYTE [DISTWIDTH] IN BLOOD BY AUTOMATED COUNT: 12.8 % (ref 11.5–17)
GFR SERPLBLD CREATININE-BSD FMLA CKD-EPI: >60 ML/MIN/1.73/M2
GLOBULIN SER-MCNC: 3.1 GM/DL (ref 2.4–3.5)
GLUCOSE SERPL-MCNC: 125 MG/DL (ref 74–100)
HCT VFR BLD AUTO: 37.3 % (ref 42–52)
HGB BLD-MCNC: 13 G/DL (ref 14–18)
HOLD SPECIMEN: NORMAL
IMM GRANULOCYTES # BLD AUTO: 0.01 X10(3)/MCL (ref 0–0.04)
IMM GRANULOCYTES NFR BLD AUTO: 0.2 %
LYMPHOCYTES # BLD AUTO: 2.38 X10(3)/MCL (ref 0.6–4.6)
LYMPHOCYTES NFR BLD AUTO: 45.7 %
MAGNESIUM SERPL-MCNC: 2 MG/DL (ref 1.6–2.6)
MCH RBC QN AUTO: 31.9 PG (ref 27–31)
MCHC RBC AUTO-ENTMCNC: 34.9 G/DL (ref 33–36)
MCV RBC AUTO: 91.6 FL (ref 80–94)
MONOCYTES # BLD AUTO: 0.3 X10(3)/MCL (ref 0.1–1.3)
MONOCYTES NFR BLD AUTO: 5.8 %
NEUTROPHILS # BLD AUTO: 2.45 X10(3)/MCL (ref 2.1–9.2)
NEUTROPHILS NFR BLD AUTO: 46.9 %
NRBC BLD AUTO-RTO: 0 %
PLATELET # BLD AUTO: 195 X10(3)/MCL (ref 130–400)
PMV BLD AUTO: 9.7 FL (ref 7.4–10.4)
POTASSIUM SERPL-SCNC: 3.6 MMOL/L (ref 3.5–5.1)
PROT SERPL-MCNC: 6.8 GM/DL (ref 6.4–8.3)
RBC # BLD AUTO: 4.07 X10(6)/MCL (ref 4.7–6.1)
SODIUM SERPL-SCNC: 140 MMOL/L (ref 136–145)
WBC # BLD AUTO: 5.21 X10(3)/MCL (ref 4.5–11.5)

## 2025-02-03 PROCEDURE — 99283 EMERGENCY DEPT VISIT LOW MDM: CPT

## 2025-02-03 PROCEDURE — 25000003 PHARM REV CODE 250: Performed by: FAMILY MEDICINE

## 2025-02-03 PROCEDURE — 83735 ASSAY OF MAGNESIUM: CPT | Performed by: FAMILY MEDICINE

## 2025-02-03 PROCEDURE — 80053 COMPREHEN METABOLIC PANEL: CPT | Performed by: FAMILY MEDICINE

## 2025-02-03 PROCEDURE — 85025 COMPLETE CBC W/AUTO DIFF WBC: CPT | Performed by: FAMILY MEDICINE

## 2025-02-03 RX ORDER — DIVALPROEX SODIUM 250 MG/1
500 TABLET, DELAYED RELEASE ORAL
Status: COMPLETED | OUTPATIENT
Start: 2025-02-03 | End: 2025-02-03

## 2025-02-03 RX ADMIN — DIVALPROEX SODIUM 500 MG: 250 TABLET, DELAYED RELEASE ORAL at 06:02

## 2025-02-04 NOTE — ED PROVIDER NOTES
Encounter Date: 2/3/2025       History     Chief Complaint   Patient presents with    Seizures     Seizure that may have lasted 5 minutes.  5mg Versed IM given by EMS     34-year-old gentleman brought in the emergency room for evaluation with a seizure.  Patient reports working today, has a seizure right as he was getting off work.  Reports compliance with medications.  Currently awake and alert.    The history is provided by the patient. The history is limited by a language barrier.     Review of patient's allergies indicates:  No Known Allergies  Past Medical History:   Diagnosis Date    Deaf     Hypertension     Migraines     Seizures      Past Surgical History:   Procedure Laterality Date    ABDOMINAL SURGERY      IMPLANTATION OF COCHLEAR PROSTHESIS      INNER EAR SURGERY Right      Family History   Problem Relation Name Age of Onset    Hypertension Mother       Social History     Tobacco Use    Smoking status: Every Day     Average packs/day: 1 pack/day for 5.0 years (5.0 ttl pk-yrs)     Types: Cigarettes     Start date: 2010    Smokeless tobacco: Never   Substance Use Topics    Alcohol use: Not Currently    Drug use: Never     Review of Systems   Constitutional:  Negative for chills, fatigue and fever.   HENT:  Negative for ear pain, rhinorrhea and sore throat.    Eyes:  Negative for photophobia and pain.   Respiratory:  Negative for cough, shortness of breath and wheezing.    Cardiovascular:  Negative for chest pain.   Gastrointestinal:  Negative for abdominal pain, diarrhea, nausea and vomiting.   Genitourinary:  Negative for dysuria.   Neurological:  Negative for dizziness, weakness and headaches.   All other systems reviewed and are negative.      Physical Exam     Initial Vitals [02/03/25 1737]   BP Pulse Resp Temp SpO2   117/74 89 20 97.3 °F (36.3 °C) 97 %      MAP       --         Physical Exam    Nursing note and vitals reviewed.  Constitutional: He appears well-developed and well-nourished.   HENT:    Head: Normocephalic and atraumatic. Mouth/Throat: Oropharynx is clear and moist.   Eyes: EOM are normal. Pupils are equal, round, and reactive to light.   Neck: Neck supple.   Normal range of motion.  Cardiovascular:  Normal rate, regular rhythm, normal heart sounds and intact distal pulses.     Exam reveals no gallop and no friction rub.       No murmur heard.  Pulmonary/Chest: Breath sounds normal. No respiratory distress.   Abdominal: Abdomen is soft. Bowel sounds are normal. He exhibits no distension. There is no abdominal tenderness. There is no rebound and no guarding.   Musculoskeletal:         General: Normal range of motion.      Cervical back: Normal range of motion and neck supple.     Neurological: He is alert and oriented to person, place, and time. He has normal strength.   Skin: Skin is warm and dry.   Psychiatric: He has a normal mood and affect. His behavior is normal. Judgment and thought content normal.         ED Course   Procedures  Labs Reviewed   COMPREHENSIVE METABOLIC PANEL - Abnormal       Result Value    Sodium 140      Potassium 3.6      Chloride 105      CO2 27      Glucose 125 (*)     Blood Urea Nitrogen 13.1      Creatinine 0.77      Calcium 9.1      Protein Total 6.8      Albumin 3.7      Globulin 3.1      Albumin/Globulin Ratio 1.2      Bilirubin Total 0.5      ALP 47      ALT 16      AST 21      eGFR >60      Anion Gap 8.0      BUN/Creatinine Ratio 17     CBC WITH DIFFERENTIAL - Abnormal    WBC 5.21      RBC 4.07 (*)     Hgb 13.0 (*)     Hct 37.3 (*)     MCV 91.6      MCH 31.9 (*)     MCHC 34.9      RDW 12.8      Platelet 195      MPV 9.7      Neut % 46.9      Lymph % 45.7      Mono % 5.8      Eos % 1.2      Basophil % 0.2      Imm Grans % 0.2      Neut # 2.45      Lymph # 2.38      Mono # 0.30      Eos # 0.06      Baso # 0.01      Imm Gran # 0.01      NRBC% 0.0     MAGNESIUM - Normal    Magnesium Level 2.00     CBC W/ AUTO DIFFERENTIAL    Narrative:     The following orders were  created for panel order CBC Auto Differential.  Procedure                               Abnormality         Status                     ---------                               -----------         ------                     CBC with Differential[[id number]]       Abnormal            Final result                 Please view results for these tests on the individual orders.   EXTRA TUBES    Narrative:     The following orders were created for panel order EXTRA TUBES.  Procedure                               Abnormality         Status                     ---------                               -----------         ------                     Light Blue Top Hold[[id number]]                             Final result               Red Top Hold[[id number]]                                    Final result               Light Green Top Hold[[id number]]                            Final result               Gold Top Hold[[id number]]                                   Final result                 Please view results for these tests on the individual orders.   LIGHT BLUE TOP HOLD    Extra Tube Hold for add-ons.     RED TOP HOLD    Extra Tube Hold for add-ons.     LIGHT GREEN TOP HOLD    Extra Tube Hold for add-ons.     GOLD TOP HOLD    Extra Tube Hold for add-ons.            Imaging Results    None          Medications   divalproex EC tablet 500 mg (500 mg Oral Given 2/3/25 1822)     Medical Decision Making  34-year-old gentleman history of recurrent seizures, reports compliance with medications, but history of noncompliance.  Will obtain laboratory evaluation, continue to monitor.     Amount and/or Complexity of Data Reviewed  Labs: ordered.    Risk  Prescription drug management.               ED Course as of 02/03/25 2044 Mon Feb 03, 2025 2042 Patient has been observed emergency for about 3 hours; no further seizure activity.  Stable for discharge to home.  ER precautions given for any acute worsening. [MW]      ED Course User  Index  [MW] Vineet Hogan MD                           Clinical Impression:  Final diagnoses:  [G40.909] Recurrent seizures (Primary)          ED Disposition Condition    Discharge Stable          ED Prescriptions    None       Follow-up Information       Follow up With Specialties Details Why Contact Info    Solo Robison DO Internal Medicine   [address] [facility] [address]  [phone]      [facility] Emergency Medicine  As needed, If symptoms worsen [address] [facility] [id number] [phone]             Vineet Hogan MD  02/03/25 2044

## 2025-02-06 ENCOUNTER — OFFICE VISIT (OUTPATIENT)
Dept: BEHAVIORAL HEALTH | Facility: CLINIC | Age: 35
End: 2025-02-06
Payer: COMMERCIAL

## 2025-02-06 VITALS
DIASTOLIC BLOOD PRESSURE: 86 MMHG | TEMPERATURE: 98 F | HEART RATE: 71 BPM | WEIGHT: 161.63 LBS | OXYGEN SATURATION: 100 % | SYSTOLIC BLOOD PRESSURE: 127 MMHG | BODY MASS INDEX: 23.86 KG/M2

## 2025-02-06 DIAGNOSIS — F32.A DEPRESSION, UNSPECIFIED DEPRESSION TYPE: ICD-10-CM

## 2025-02-06 DIAGNOSIS — F25.9 SCHIZOAFFECTIVE DISORDER, UNSPECIFIED TYPE: Primary | ICD-10-CM

## 2025-02-06 DIAGNOSIS — G47.00 INSOMNIA, UNSPECIFIED TYPE: ICD-10-CM

## 2025-02-06 PROCEDURE — 99213 OFFICE O/P EST LOW 20 MIN: CPT | Mod: PBBFAC,PN | Performed by: STUDENT IN AN ORGANIZED HEALTH CARE EDUCATION/TRAINING PROGRAM

## 2025-02-06 PROCEDURE — 3079F DIAST BP 80-89 MM HG: CPT | Mod: CPTII,,, | Performed by: STUDENT IN AN ORGANIZED HEALTH CARE EDUCATION/TRAINING PROGRAM

## 2025-02-06 PROCEDURE — 99214 OFFICE O/P EST MOD 30 MIN: CPT | Mod: S$PBB,,, | Performed by: STUDENT IN AN ORGANIZED HEALTH CARE EDUCATION/TRAINING PROGRAM

## 2025-02-06 PROCEDURE — 1160F RVW MEDS BY RX/DR IN RCRD: CPT | Mod: CPTII,,, | Performed by: STUDENT IN AN ORGANIZED HEALTH CARE EDUCATION/TRAINING PROGRAM

## 2025-02-06 PROCEDURE — 1159F MED LIST DOCD IN RCRD: CPT | Mod: CPTII,,, | Performed by: STUDENT IN AN ORGANIZED HEALTH CARE EDUCATION/TRAINING PROGRAM

## 2025-02-06 PROCEDURE — 3074F SYST BP LT 130 MM HG: CPT | Mod: CPTII,,, | Performed by: STUDENT IN AN ORGANIZED HEALTH CARE EDUCATION/TRAINING PROGRAM

## 2025-02-06 PROCEDURE — 3008F BODY MASS INDEX DOCD: CPT | Mod: CPTII,,, | Performed by: STUDENT IN AN ORGANIZED HEALTH CARE EDUCATION/TRAINING PROGRAM

## 2025-02-06 RX ORDER — MIRTAZAPINE 15 MG/1
15 TABLET, FILM COATED ORAL NIGHTLY
Qty: 30 TABLET | Refills: 5 | Status: SHIPPED | OUTPATIENT
Start: 2025-02-06 | End: 2026-02-06

## 2025-02-06 NOTE — PROGRESS NOTES
"Outpatient Psychiatry Follow-Up Visit    2/6/2025    Clinical Status of Patient:  Outpatient (Ambulatory)    Chief Complaint:  Chun Fischer is a 34 y.o. male who presents today for follow-up of psychosis. Patient last seen for follow-up on 4/25/2024. Met with patient.     Interval History and Content of Current Session:  Interim Events/Subjective Report/Content of Current Session:   Pt reports doing "ok"  overall.  Reports "angry" mood, elevated anxiety.  Sleep poor. Appetite low, weight decreased.  Energy low, motivation fair.  Endorses irritability, denies hopelessness.  +Paranoia.  Denies SI/HI, denies plan or desire for self harm or harm to others.  Denies SE from current regimen. Denies change in chronic somatic complaints.  Continues to have occasional seizures (last about 1 week ago).  Underwent sleep study, no results yet.  Pt voices desire to adjust regimen to address ongoing symptoms.  Does not feel that abilify is providing any benefit    Psychiatric Review of Systems-is patient experiencing or having changes in  Integrated into HPI above.     Review of Systems   PSYCHIATRIC: Pertinant items are noted in the narrative.  CONSTITUTIONAL: No weight gain or loss.  MUSCULOSKELETAL: No pain or stiffness of the joints.  NEUROLOGIC: No weakness, sensory changes, seizures, confusion, memory loss, tremor or other abnormal movements.  CARDIAC: No CP, no palpitations  RESPIRATORY: No shortness of breath.  CARDIOVASCULAR: No tachycardia or chest pain.  GASTROINTESTINAL: No nausea, vomiting, pain, constipation or diarrhea.    Past Medical, Family and Social History: The patient's past medical, family and social history have been reviewed and updated as appropriate within the electronic medical record - see encounter notes.    Compliance: poor, self discontinued due to SE    Side effects: sedation from seroquel    Risk Parameters:  Patient reports no suicidal ideation  Patient reports no homicidal " "ideation  Patient reports no self-injurious behavior  Patient reports no violent behavior    Exam (detailed: at least 9 elements; comprehensive: all 15 elements)   Constitutional  Vitals:  Most recent vital signs, dated less than 90 days prior to this appointment, were reviewed.     Vitals:    02/06/25 0802   BP: 127/86   Pulse: 71   Temp: 97.7 °F (36.5 °C)   SpO2: 100%   Weight: 73.3 kg (161 lb 9.6 oz)        General:   Constitutional: No acute distress, appears stated age, casually dressed    Neurologic:   Motor: moves all extremities spontaneously and without difficulty, no abnormal involuntary movements observed  Gait: normal gait and station    Mental status examination:   Appearance: appears stated age, casually dressed, no acute distress  Behavior: unremarkable for situation, calm and cooperative  Mood: "ok"  Affect: mood congruent and constricted, irritable  Thought process: linear and goal directed  Thought content: no plan or desire for self harm or harm to others, denies paranoia, no delusional ideation volunteered  Perceptions: denies hallucinations or other altered perceptions  Associations: appropriate for conversation  Orientation: oriented to day of week, month, year, location, and situation  Language: English, fluid  Attention: able to attend to interview  Insight: good  Judgement: good    PHQ9:  Over the last two weeks how often have you been bothered by little interest or pleasure in doing things: 1  Over the last two weeks how often have you been bothered by feeling down, depressed or hopeless: 1  PHQ-2 Total Score: 2  PHQ-9 Score: 21  PHQ-9 Interpretation: Severe          3/11/2024     9:00 AM   GAD7   1. Feeling nervous, anxious, or on edge? 2   2. Not being able to stop or control worrying? 2   3. Worrying too much about different things? 2   4. Trouble relaxing? 2   5. Being so restless that it is hard to sit still? 2   6. Becoming easily annoyed or irritable? 3   7. Feeling afraid as if " something awful might happen? 1   8. If you checked off any problems, how difficult have these problems made it for you to do your work, take care of things at home, or get along with other people? 3   EZIO-7 Score 14     Assessment and Diagnosis   Status/Progress: Based on the examination today, the patient's problem(s) is/are inadequately controlled.  New problems have not been presented today.   Co morbidities are complicating management of the primary condition.  Number of separate conditions addressed during today's visit: 2 (mood fair, sleep uncontrolled) .  Are medication adjustments being made today: Yes.  Are referral(s) being ordered today: No.  Complexity (level) of medical decision making employed in the encounter: MODERATE.    General Impression:    ICD-10-CM ICD-9-CM   1. Schizoaffective disorder, unspecified type  F25.9 295.70   2. Insomnia, unspecified type  G47.00 780.52   3. Depression, unspecified depression type  F32.A 311     Intervention/Counseling/Treatment Plan   Stop abilify, pt denies benefit  Start remeron 15mg nightly, discussed potential SE including but not limited to sedation, weight gain, increased appetite, suicidal thinking/behavior  Unclear if pt has formal thought disorder at this point or if historical psychotic symptoms represent an interictal neuropsychiatric process, will monitor over time  No need for PEC as pt is not an imminent danger to self or others or gravely disabled due to acute psychiatric illness  Discussed that pt should either call clinic for psychiatric crisis symptoms or present to nearest emergency room    Discussed with patient informed consent including diagnosis, risks and benefits of proposed treatment above vs. alternative treatments vs. no treatment, as well as serious and common side effects of these treatments, and the inherent unpredictability of individual responses to these treatments. The patient expresses understanding of the above and displays the  capacity to agree with this current plan. Patient also agrees that, currently, the benefits outweigh the risks and would like to pursue treatment at this time, and had no other questions.    Instructions:  Take all medications as prescribed.    Abstain from recreational drugs and alcohol.  Present to ED or call 911 for SI/HI plan or intent, psychosis, or medical emergency.    Return to Clinic: Follow up in about 6 weeks (around 3/20/2025).    Total time:   The total time for services performed on the date of the encounter (including review of prior visit notes, review of notes from other providers, review of results from laboratory/imaging studies, face-to-face time with patient, and time spent on other activities directly related to patient care): 30 minutes.    Salvador Rowe MD  [facility]

## 2025-02-08 ENCOUNTER — HOSPITAL ENCOUNTER (EMERGENCY)
Facility: HOSPITAL | Age: 35
Discharge: HOME | End: 2025-02-09
Attending: FAMILY MEDICINE
Payer: COMMERCIAL

## 2025-02-08 VITALS
RESPIRATION RATE: 16 BRPM | DIASTOLIC BLOOD PRESSURE: 84 MMHG | WEIGHT: 161.63 LBS | HEIGHT: 69 IN | HEART RATE: 72 BPM | BODY MASS INDEX: 23.94 KG/M2 | SYSTOLIC BLOOD PRESSURE: 128 MMHG | TEMPERATURE: 98 F | OXYGEN SATURATION: 98 %

## 2025-02-08 DIAGNOSIS — R53.83 FATIGUE, UNSPECIFIED TYPE: Primary | ICD-10-CM

## 2025-02-08 DIAGNOSIS — R42 DIZZINESS: ICD-10-CM

## 2025-02-08 DIAGNOSIS — T50.905A MEDICATION SIDE EFFECT, INITIAL ENCOUNTER: ICD-10-CM

## 2025-02-08 LAB
ALBUMIN SERPL-MCNC: 3.8 G/DL (ref 3.5–5)
ALBUMIN/GLOB SERPL: 1.2 RATIO (ref 1.1–2)
ALP SERPL-CCNC: 52 UNIT/L (ref 40–150)
ALT SERPL-CCNC: 16 UNIT/L (ref 0–55)
ANION GAP SERPL CALC-SCNC: 6 MEQ/L
AST SERPL-CCNC: 22 UNIT/L (ref 5–34)
BASOPHILS # BLD AUTO: 0.02 X10(3)/MCL
BASOPHILS NFR BLD AUTO: 0.3 %
BILIRUB SERPL-MCNC: 0.3 MG/DL
BUN SERPL-MCNC: 15.8 MG/DL (ref 8.9–20.6)
CALCIUM SERPL-MCNC: 9 MG/DL (ref 8.4–10.2)
CHLORIDE SERPL-SCNC: 109 MMOL/L (ref 98–107)
CO2 SERPL-SCNC: 26 MMOL/L (ref 22–29)
CREAT SERPL-MCNC: 0.69 MG/DL (ref 0.72–1.25)
CREAT/UREA NIT SERPL: 23
EOSINOPHIL # BLD AUTO: 0.23 X10(3)/MCL (ref 0–0.9)
EOSINOPHIL NFR BLD AUTO: 3.7 %
ERYTHROCYTE [DISTWIDTH] IN BLOOD BY AUTOMATED COUNT: 13.2 % (ref 11.5–17)
ETHANOL SERPL-MCNC: <10 MG/DL
GFR SERPLBLD CREATININE-BSD FMLA CKD-EPI: >60 ML/MIN/1.73/M2
GLOBULIN SER-MCNC: 3.2 GM/DL (ref 2.4–3.5)
GLUCOSE SERPL-MCNC: 100 MG/DL (ref 74–100)
HCT VFR BLD AUTO: 40 % (ref 42–52)
HGB BLD-MCNC: 13.5 G/DL (ref 14–18)
IMM GRANULOCYTES # BLD AUTO: 0.01 X10(3)/MCL (ref 0–0.04)
IMM GRANULOCYTES NFR BLD AUTO: 0.2 %
LYMPHOCYTES # BLD AUTO: 2.16 X10(3)/MCL (ref 0.6–4.6)
LYMPHOCYTES NFR BLD AUTO: 34.3 %
MCH RBC QN AUTO: 31.5 PG (ref 27–31)
MCHC RBC AUTO-ENTMCNC: 33.8 G/DL (ref 33–36)
MCV RBC AUTO: 93.5 FL (ref 80–94)
MONOCYTES # BLD AUTO: 0.32 X10(3)/MCL (ref 0.1–1.3)
MONOCYTES NFR BLD AUTO: 5.1 %
NEUTROPHILS # BLD AUTO: 3.55 X10(3)/MCL (ref 2.1–9.2)
NEUTROPHILS NFR BLD AUTO: 56.4 %
NRBC BLD AUTO-RTO: 0 %
PLATELET # BLD AUTO: 213 X10(3)/MCL (ref 130–400)
PMV BLD AUTO: 9.9 FL (ref 7.4–10.4)
POTASSIUM SERPL-SCNC: 3.8 MMOL/L (ref 3.5–5.1)
PROT SERPL-MCNC: 7 GM/DL (ref 6.4–8.3)
RBC # BLD AUTO: 4.28 X10(6)/MCL (ref 4.7–6.1)
SODIUM SERPL-SCNC: 141 MMOL/L (ref 136–145)
WBC # BLD AUTO: 6.29 X10(3)/MCL (ref 4.5–11.5)

## 2025-02-08 PROCEDURE — 99284 EMERGENCY DEPT VISIT MOD MDM: CPT | Mod: 25

## 2025-02-08 PROCEDURE — 85025 COMPLETE CBC W/AUTO DIFF WBC: CPT | Performed by: FAMILY MEDICINE

## 2025-02-08 PROCEDURE — 25000003 PHARM REV CODE 250: Performed by: FAMILY MEDICINE

## 2025-02-08 PROCEDURE — 80053 COMPREHEN METABOLIC PANEL: CPT | Performed by: FAMILY MEDICINE

## 2025-02-08 PROCEDURE — 96360 HYDRATION IV INFUSION INIT: CPT

## 2025-02-08 PROCEDURE — 82077 ASSAY SPEC XCP UR&BREATH IA: CPT | Performed by: FAMILY MEDICINE

## 2025-02-08 PROCEDURE — 93005 ELECTROCARDIOGRAM TRACING: CPT

## 2025-02-08 RX ORDER — SODIUM CHLORIDE 9 MG/ML
1000 INJECTION, SOLUTION INTRAVENOUS
Status: COMPLETED | OUTPATIENT
Start: 2025-02-08 | End: 2025-02-09

## 2025-02-08 RX ADMIN — SODIUM CHLORIDE 1000 ML: 9 INJECTION, SOLUTION INTRAVENOUS at 11:02

## 2025-02-08 NOTE — Clinical Note
"Chun Patel" Jessicadavidsourav was seen and treated in our emergency department on 8/20/2022.  He may return to work on 08/21/2022.       If you have any questions or concerns, please don't hesitate to call.       RN    "
Per patient, "one gallon of vodka" daily

## 2025-02-08 NOTE — Clinical Note
"Chun Patel" Jessicadavidsourav was seen and treated in our emergency department on 2/8/2025.  He may return to work on 02/11/2025.       If you have any questions or concerns, please don't hesitate to call.       RN    "

## 2025-02-09 LAB
HOLD SPECIMEN: NORMAL

## 2025-02-09 PROCEDURE — 96361 HYDRATE IV INFUSION ADD-ON: CPT

## 2025-02-09 PROCEDURE — 25000003 PHARM REV CODE 250: Performed by: FAMILY MEDICINE

## 2025-02-09 RX ORDER — SODIUM CHLORIDE 9 MG/ML
1000 INJECTION, SOLUTION INTRAVENOUS
Status: COMPLETED | OUTPATIENT
Start: 2025-02-09 | End: 2025-02-09

## 2025-02-09 RX ADMIN — SODIUM CHLORIDE 1000 ML: 9 INJECTION, SOLUTION INTRAVENOUS at 12:02

## 2025-02-09 NOTE — ED PROVIDER NOTES
Encounter Date: 2/8/2025       History     Chief Complaint   Patient presents with    Fatigue     Pt to ED via [facility] for lethargic behavior after being found at local [employer] station. EMS states pt told them he started new depression med recently and that might have caused the weakness. Pt is deaf and responding to triage questions. Pt aaox4, vss, in nad     History is mildly limited secondary to language constrained as patient is nonverbal and history is obtained with the use of an  line for sign language.  Patient is brought to the emergency room via EMS and he is a rather pleasant 34-year-old gentleman with a known history of hypertension, migraines, seizures, and depression.  Patient was started on Remeron 2 days ago and he states he took his 1st pill yesterday.  Patient states since he has been taking the medications he has been overtly sleepy and unable to keep his eyes open.  Patient states he has been markedly slow at work and his boss has been complained about this.  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  Patient denies any alcohol or drug use, denies similar symptomatology in the past, denies any pain, and states he just feels overtly weak and sleepy.    The history is provided by the patient and the EMS personnel. No  was used.     Review of patient's allergies indicates:  No Known Allergies  Past Medical History:   Diagnosis Date    Deaf     Hypertension     Migraines     Seizures      Past Surgical History:   Procedure Laterality Date    ABDOMINAL SURGERY      IMPLANTATION OF COCHLEAR PROSTHESIS      INNER EAR SURGERY Right      Family History   Problem Relation Name Age of Onset    Hypertension Mother       Social History     Tobacco Use    Smoking status: Every Day     Average packs/day: 1 pack/day for 5.0 years (5.0 ttl pk-yrs)      Types: Cigarettes     Start date: 2010    Smokeless tobacco: Never   Substance Use Topics    Alcohol use: Not Currently    Drug use: Never     Review of Systems   Constitutional:  Positive for fatigue. Negative for activity change, chills, diaphoresis and fever.   HENT:  Negative for congestion, ear pain, nosebleeds, rhinorrhea, sinus pressure and sore throat.    Eyes:  Negative for visual disturbance.   Respiratory:  Negative for cough, chest tightness, shortness of breath and wheezing.    Cardiovascular:  Negative for chest pain and palpitations.   Gastrointestinal:  Negative for abdominal pain, constipation, diarrhea, nausea and vomiting.   Genitourinary:  Negative for dysuria.   Musculoskeletal:  Negative for arthralgias, back pain, joint swelling and myalgias.   Skin:  Negative for color change and rash.   Neurological:  Negative for dizziness, syncope, weakness, numbness and headaches.   Psychiatric/Behavioral:  Negative for behavioral problems, self-injury and suicidal ideas.    All other systems reviewed and are negative.      Physical Exam     Initial Vitals [02/08/25 2245]   BP Pulse Resp Temp SpO2   128/84 72 16 97.7 °F (36.5 °C) 98 %      MAP       --         Physical Exam    Nursing note and vitals reviewed.  Constitutional: He appears well-developed and well-nourished. He is not diaphoretic. No distress.   Markedly sleepy and lethargic, afebrile, not pale, anicteric, no cyanosis   HENT:   Head: Normocephalic and atraumatic.   Right Ear: External ear normal.   Left Ear: External ear normal.   Nose: Nose normal. Mouth/Throat: Oropharynx is clear and moist. No oropharyngeal exudate.   Eyes: Conjunctivae and EOM are normal. Pupils are equal, round, and reactive to light. No scleral icterus.   Neck: Neck supple. No thyromegaly present. No tracheal deviation present.   Normal range of motion.  Cardiovascular:  Normal rate, regular rhythm, normal heart sounds and intact distal pulses.     Exam reveals no  gallop and no friction rub.       No murmur heard.  Pulmonary/Chest: Breath sounds normal. No respiratory distress. He has no wheezes. He has no rhonchi. He has no rales.   Abdominal: Abdomen is soft. Bowel sounds are normal. There is no abdominal tenderness. There is no rebound and no guarding.   Musculoskeletal:         General: No tenderness or edema. Normal range of motion.      Cervical back: Normal range of motion and neck supple.     Lymphadenopathy:     He has no cervical adenopathy.   Neurological: He is alert and oriented to person, place, and time. He has normal strength. No cranial nerve deficit. GCS score is 15. GCS eye subscore is 4. GCS verbal subscore is 5. GCS motor subscore is 6.   Skin: Skin is warm and dry.   Psychiatric: He has a normal mood and affect. Thought content normal.         ED Course   Procedures  Labs Reviewed   COMPREHENSIVE METABOLIC PANEL - Abnormal       Result Value    Sodium 141      Potassium 3.8      Chloride 109 (*)     CO2 26      Glucose 100      Blood Urea Nitrogen 15.8      Creatinine 0.69 (*)     Calcium 9.0      Protein Total 7.0      Albumin 3.8      Globulin 3.2      Albumin/Globulin Ratio 1.2      Bilirubin Total 0.3      ALP 52      ALT 16      AST 22      eGFR >60      Anion Gap 6.0      BUN/Creatinine Ratio 23     CBC WITH DIFFERENTIAL - Abnormal    WBC 6.29      RBC 4.28 (*)     Hgb 13.5 (*)     Hct 40.0 (*)     MCV 93.5      MCH 31.5 (*)     MCHC 33.8      RDW 13.2      Platelet 213      MPV 9.9      Neut % 56.4      Lymph % 34.3      Mono % 5.1      Eos % 3.7      Basophil % 0.3      Imm Grans % 0.2      Neut # 3.55      Lymph # 2.16      Mono # 0.32      Eos # 0.23      Baso # 0.02      Imm Gran # 0.01      NRBC% 0.0     ALCOHOL,MEDICAL (ETHANOL) - Normal    Ethanol Level <10.0     CBC W/ AUTO DIFFERENTIAL    Narrative:     The following orders were created for panel order CBC Auto Differential.  Procedure                               Abnormality          Status                     ---------                               -----------         ------                     CBC with Differential[[id number]]       Abnormal            Final result                 Please view results for these tests on the individual orders.   EXTRA TUBES    Narrative:     The following orders were created for panel order EXTRA TUBES.  Procedure                               Abnormality         Status                     ---------                               -----------         ------                     Light Blue Top Hold[[id number]]                             Final result               Light Green Top Hold[[id number]]                            Final result               Lavender Top Hold[[id number]]                               Final result               Gold Top Hold[[id number]]                                   Final result                 Please view results for these tests on the individual orders.   LIGHT BLUE TOP HOLD    Extra Tube Hold for add-ons.     LIGHT GREEN TOP HOLD    Extra Tube Hold for add-ons.     LAVENDER TOP HOLD    Extra Tube Hold for add-ons.     GOLD TOP HOLD    Extra Tube Hold for add-ons.     URINALYSIS, REFLEX TO URINE CULTURE   DRUG SCREEN, URINE (BEAKER)     EKG Readings: (Independently Interpreted)   ECG done at 11:22 p.m. interpreted by me     Normal sinus rhythm with a ventricular rate of 64 beats per minute, normal axis, no ST segment changes, new onset Q-waves, no appreciable bundle branch block, no voltage criteria for LVH, normal intervals, no delta waves       Imaging Results    None          Medications   0.9% NaCl infusion (0 mLs Intravenous Stopped 2/9/25 0003)   0.9% NaCl infusion (1,000 mLs Intravenous New Bag 2/9/25 0039)     Medical Decision Making  Patient's clinical picture seems to be in keeping with the side effect profile of mirtazapine which he is currently taking.  ECGs done upon arrival which returned showing patient to be normal sinus  rhythm.  Patient is hydrated with normal saline IV fluids and a cascade of labs were drawn. Labs returned relatively within normal limits with CBC showing a normal white blood cell count but hemoglobin mildly low at 13.5 and hematocrit of 40.  CMP does returned with a chloride of 109 and creatinine is low at 0.69.  These findings are reviewed with the patient and he verbalizes his understanding.  Blood alcohol is noted to be negative and patient is unable to give a urine sample for further analysis.  Patient is monitored for a prolonged period of time here in the emergency room and remained stable.  Patient's father is contacted and he comes to the emergency room and pick the patient up and he is discharged to the care of his father.  Patient is encouraged to follow up with his primary medical doctor in 2-3 days as needed and to return to the ER if clinical picture worsens.  I do believe increased lethargy and sleepiness is from the use of mirtazapine and he is encouraged to discuss this further with his primary doctor if it affects his day-to-day activities and his work.  Patient's condition upon discharge is stable vitals remained stable    Amount and/or Complexity of Data Reviewed  Labs: ordered.    Risk  Prescription drug management.                                1. Differential diagnosis:  Seizure, substance abuse, hypothyroidism, amotivational syndrome  2. Comorbidities considered that were addressed or put patient at increased risk are reviewed and noted  3. External notes reviewed: As stated in MDM  4. Discussed case and management with patient  5. Independent interpretations of workup like ECG, and labs  6. Diagnostic therapy is considered, ordered and those not considered. Scores considered  7. Social determinants of health considered (living situation and conditions, lives far, family siutation, psychiatric limitations, and possible substance abuse etc)      Clinical Impression:  Final diagnoses:  [R42]  Dizziness  [R53.83] Fatigue, unspecified type (Primary)  [T50.871E] Medication side effect, initial encounter       This chart is generated using a voice recognition system.  Grammatical and content areas may inadvertently be generated in error.  Please contact me if you find a perceive any inappropriate information in this chart.  Thank you.   ED Disposition Condition    Discharge Stable          ED Prescriptions    None       Follow-up Information       Follow up With Specialties Details Why Contact Info    Solo Robison, DO Internal Medicine Call in 1 day For further evaluation and management [address] [facility] [address]  [phone]               Sun Farr MD  02/09/25 2821

## 2025-02-10 LAB
OHS QRS DURATION: 84 MS
OHS QTC CALCULATION: 410 MS

## 2025-03-13 ENCOUNTER — OFFICE VISIT (OUTPATIENT)
Dept: INTERNAL MEDICINE | Facility: CLINIC | Age: 35
End: 2025-03-13
Payer: COMMERCIAL

## 2025-03-13 VITALS
SYSTOLIC BLOOD PRESSURE: 132 MMHG | TEMPERATURE: 97 F | HEIGHT: 69 IN | HEART RATE: 74 BPM | WEIGHT: 165 LBS | BODY MASS INDEX: 24.44 KG/M2 | DIASTOLIC BLOOD PRESSURE: 84 MMHG | RESPIRATION RATE: 19 BRPM | OXYGEN SATURATION: 99 %

## 2025-03-13 DIAGNOSIS — Z00.00 HEALTHCARE MAINTENANCE: Primary | ICD-10-CM

## 2025-03-13 DIAGNOSIS — R79.89 OTHER SPECIFIED ABNORMAL FINDINGS OF BLOOD CHEMISTRY: ICD-10-CM

## 2025-03-13 DIAGNOSIS — G47.00 INSOMNIA, UNSPECIFIED TYPE: ICD-10-CM

## 2025-03-13 DIAGNOSIS — F25.9 SCHIZOAFFECTIVE DISORDER, UNSPECIFIED TYPE: ICD-10-CM

## 2025-03-13 DIAGNOSIS — F32.A DEPRESSION, UNSPECIFIED DEPRESSION TYPE: ICD-10-CM

## 2025-03-13 PROCEDURE — 99213 OFFICE O/P EST LOW 20 MIN: CPT | Mod: PBBFAC

## 2025-03-13 NOTE — PROGRESS NOTES
[facility] INTERNAL MEDICINE  OUTPATIENT OFFICE VISIT NOTE    SUBJECTIVE:      Chief Complaint: Follow-up (Pt here today for f/u visit. Continues to c/o fatigue.)       HPI: Chun Fischer is a 34 y.o. yo male w/ PMH of  has a past medical history of Deaf, epilepsy?  Schizoaffective disorder per chart review, chronic oral pain., who presents for 2 month(s) follow up.   Fatigue - reports being very tired. 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 States clocks in to work at 7 am but gets home around 12 AM. Sleeps until 6 AM.  States he is working to save money for a car.  Does not want to depend on his father for transportation.  Discussed patient unable to drive legally given he has not been seizure-free for the last 6 months.     Initiated on Remeron 15 mg, takes it QHS and cut it to 7.5 mg after finding it overly sedating.  Had ED visit on 02/08/2025 for fatigue and subsequently discharged home.  Patient will need to find new psychiatrist, recommended [facility].  In addition recommended patient to seek behavioral therapy/psychologist.  Recommended daily journaling.    Discussing job difficulties - stating having to work overtime and interpersonal conflict with management. Apparently not wearing cochlear implants as he states the environment is loud and all he hears is a loud beep.  At times he states    Last seizure - In February and had subsequent ED visit.  He reports compliance with his Depakote 1000 mg q.h.s..  Denies missing any doses.  Denies any seizures since February.    Agreeable to Pneumovax, but patient left prior to vaccine being administered.  Will administer at next clinic visit.    Past Medical History:   has a past medical history of Deaf, Hypertension, Migraines, and Seizures.     Past Surgical History:   has a past surgical history that includes Inner ear surgery (Right); Implantation of cochlear prosthesis;  "and Abdominal surgery.     Family History:  family history includes Hypertension in his mother.     Social History:   reports that he has been smoking cigarettes. He started smoking about 15 years ago. He has a 5 pack-year smoking history. He has never used smokeless tobacco. He reports that he does not currently use alcohol. He reports that he does not use drugs.     Allergies:  has no known allergies.     Home Medications:  Current Outpatient Medications   Medication Instructions    divalproex ER (DEPAKOTE ER) 1,000 mg, Oral, Nightly    famotidine (PEPCID) 20 mg, Oral, 2 times daily    mirtazapine (REMERON) 15 mg, Oral, Nightly    white petrolatum-mineral oiL (ARTIFICIAL TEARS, ROSALBA/MIN,) 83-15 % Oint Both Eyes, Nightly        ROS:  Review of Systems   Constitutional:  Positive for malaise/fatigue. Negative for chills and fever.          OBJECTIVE:     Vital signs:   /84 (BP Location: Right arm, Patient Position: Sitting)   Pulse 74   Temp 97 °F (36.1 °C)   Resp 19   Ht 5' 9" (1.753 m)   Wt 74.8 kg (165 lb)   SpO2 99%   BMI 24.37 kg/m²      Physical Examination:  General: Well nourished w/o distress  Neck: Full ROM; no lymphadenopathy  Pulm: CTA bilaterally, normal work of breathing  CV: S1, S2 w/o murmurs or gallops; no edema noted  GI: Soft with normal bowel sounds in all quadrants, no masses on palpation  MSK: Full ROM of all extremities and spine w/o limitation or discomfort.  Left-sided tenderness to palpation at left inferior chest/upper left upper quadrant abdominal area.  Derm: No rashes, abnormal bruising, or skin lesions noted at chest area.  Noted area of darkening skin in left flank consistent with birthmark  Neuro: AAOx4; CN II-XII intact; motor/sensory function intact      Labs:  CMP:   Recent Labs   Lab 01/07/25  1349 02/03/25  1809 02/08/25  2316   Sodium 140 140 141   Potassium 3.8 3.6 3.8   CO2 30 H 27 26   Blood Urea Nitrogen 8.6 L 13.1 15.8   Creatinine 0.83 0.77 0.69 L   Glucose " 73 L 125 H 100   Calcium 9.3 9.1 9.0   Albumin 4.2 3.7 3.8   Bilirubin Total 0.4 0.5 0.3   AST 25 21 22   ALT 17 16 16   ALP 55 47 52     CBC:   Recent Labs   Lab 01/07/25  1349 02/03/25  1808 02/08/25  2316   WBC 3.76 L 5.21 6.29   Neut # 1.61 L 2.45 3.55   RBC 4.20 L 4.07 L 4.28 L   Hgb 13.3 L 13.0 L 13.5 L   Hct 39.1 L 37.3 L 40.0 L   Platelet 175 195 213   MCV 93.1 91.6 93.5   RDW 13.2 12.8 13.2     FLP:         DM:   Recent Labs   Lab 01/07/25  1349 02/03/25  1809 02/08/25  2316   Creatinine 0.83 0.77 0.69 L     Thyroid:   Recent Labs   Lab 08/20/22  1704 10/07/23  0753 09/20/24  0934   TSH 0.9325 1.163 0.741     Anemia:   Recent Labs   Lab 02/08/25  2316   Hgb 13.5 L   Hct 40.0 L           ASSESSMENT & PLAN:      Schizoaffective disorder   - Abilify 5 mg QD discontinued  - initiated on Remeron 15 mg but experienced significant sedation    - pt reduced dose to 7.5 mg qhs - advised to increase to 15 mg per Rx as s  - will need to f/u with psychiatrist at [facility] as previous provider no longer practicing in the area  - recommended f/u with [facility] for therapist also    Epilepsy  - EEG 1/31/24 - abnormal routine awake and sleep EEG suggestive of 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   - s/p tooth extraction w/ [facility]   - medical release form signed to obtain records  - dentures to be done in upcoming months  - ibuprofen p.r.n.    Tobacco use disorder  - 1 pack/day since 20 y/o (8 pack year)  - encouraged reducing tobacco use - has cut down to 2 cigarettes per day  - taking Rx nicotine patches and gum as prescribed    Snoring  Fatigue  - referred to sleep medicine previously  - done in Jan 2025 - no  reported ALEA  - would benefit from mouth guard       Health Maintenance  Vaccinations  Immunization History   Administered Date(s) Administered    DTP 1989, 1989, 1989, 11/29/1990    HIB 11/01/1990    Hepatitis A, Adult 03/28/2008, 09/30/2008    Influenza - Quadrivalent - PF *Preferred* (6 months and older) 01/17/2024    Influenza - Trivalent - Fluarix, Flulaval, Fluzone, Afluria - PF 12/10/2020, 09/26/2024    MMR 08/10/1990    OPV 1989, 1989, 11/01/1990    Pneumococcal Conjugate - 13 Valent 07/13/2016    Td (ADULT) 01/17/2014    Td (Adult), Unspecified Formulation 02/28/2000    Td - PF (ADULT) 04/28/2015    Tdap 01/12/2014, 11/13/2014, 09/05/2016, 02/24/2017, 07/17/2021, 12/30/2021, 02/01/2022       -Flu:  Done 1/71/24     -Pneumonia: will address at next visit    Screening   -Lung Ca. Screening:  Not of age   -Colon Ca. Screening:  Not of age   -Hep C, HIV:  Ordered 05/08/2024-pending  Social   -EtOH:  reports that he does not currently use alcohol.   -Tobacco:  reports that he has been smoking cigarettes. He started smoking about 15 years ago. He has a 5 pack-year smoking history. He has never used smokeless tobacco.   -Drugs:  reports no history of drug use.    -Depression:   Depression: Low Risk  (1/7/2025)    Depression     Last PHQ-4: Flowsheet Data: 2          Return to clinic in 4 month(s).    Solo Robison DO  \[facility]\"" Internal Medicine PGY-3    Orders Placed This Encounter    Comprehensive Metabolic Panel    CBC Auto Differential    Hemoglobin A1C    HIV 1/2 Ag/Ab (4th Gen)    Hepatitis C Antibody    pneumoc 20-azael conj-dip cr(PF) (PREVNAR-20 (PF)) injection Syrg 0.5 mL

## 2025-04-14 ENCOUNTER — OFFICE VISIT (OUTPATIENT)
Dept: BEHAVIORAL HEALTH | Facility: CLINIC | Age: 35
End: 2025-04-14
Payer: COMMERCIAL

## 2025-04-14 VITALS
HEART RATE: 77 BPM | WEIGHT: 168.13 LBS | BODY MASS INDEX: 24.82 KG/M2 | TEMPERATURE: 98 F | OXYGEN SATURATION: 100 % | SYSTOLIC BLOOD PRESSURE: 136 MMHG | DIASTOLIC BLOOD PRESSURE: 87 MMHG

## 2025-04-14 DIAGNOSIS — F25.9 SCHIZOAFFECTIVE DISORDER, UNSPECIFIED TYPE: Primary | ICD-10-CM

## 2025-04-14 DIAGNOSIS — G47.00 INSOMNIA, UNSPECIFIED TYPE: ICD-10-CM

## 2025-04-14 PROCEDURE — 1160F RVW MEDS BY RX/DR IN RCRD: CPT | Mod: CPTII,,, | Performed by: STUDENT IN AN ORGANIZED HEALTH CARE EDUCATION/TRAINING PROGRAM

## 2025-04-14 PROCEDURE — 99214 OFFICE O/P EST MOD 30 MIN: CPT | Mod: S$PBB,,, | Performed by: STUDENT IN AN ORGANIZED HEALTH CARE EDUCATION/TRAINING PROGRAM

## 2025-04-14 PROCEDURE — 99213 OFFICE O/P EST LOW 20 MIN: CPT | Mod: PBBFAC,PN | Performed by: STUDENT IN AN ORGANIZED HEALTH CARE EDUCATION/TRAINING PROGRAM

## 2025-04-14 PROCEDURE — 3008F BODY MASS INDEX DOCD: CPT | Mod: CPTII,,, | Performed by: STUDENT IN AN ORGANIZED HEALTH CARE EDUCATION/TRAINING PROGRAM

## 2025-04-14 PROCEDURE — 3075F SYST BP GE 130 - 139MM HG: CPT | Mod: CPTII,,, | Performed by: STUDENT IN AN ORGANIZED HEALTH CARE EDUCATION/TRAINING PROGRAM

## 2025-04-14 PROCEDURE — 3079F DIAST BP 80-89 MM HG: CPT | Mod: CPTII,,, | Performed by: STUDENT IN AN ORGANIZED HEALTH CARE EDUCATION/TRAINING PROGRAM

## 2025-04-14 PROCEDURE — 1159F MED LIST DOCD IN RCRD: CPT | Mod: CPTII,,, | Performed by: STUDENT IN AN ORGANIZED HEALTH CARE EDUCATION/TRAINING PROGRAM

## 2025-04-14 RX ORDER — TRAZODONE HYDROCHLORIDE 50 MG/1
TABLET ORAL
Qty: 60 TABLET | Refills: 11 | Status: SHIPPED | OUTPATIENT
Start: 2025-04-14

## 2025-05-16 ENCOUNTER — HOSPITAL ENCOUNTER (EMERGENCY)
Facility: HOSPITAL | Age: 35
Discharge: HOME | End: 2025-05-16
Attending: EMERGENCY MEDICINE
Payer: COMMERCIAL

## 2025-05-16 VITALS
OXYGEN SATURATION: 98 % | SYSTOLIC BLOOD PRESSURE: 123 MMHG | WEIGHT: 169 LBS | TEMPERATURE: 98 F | BODY MASS INDEX: 25.03 KG/M2 | DIASTOLIC BLOOD PRESSURE: 84 MMHG | RESPIRATION RATE: 18 BRPM | HEART RATE: 83 BPM | HEIGHT: 69 IN

## 2025-05-16 DIAGNOSIS — L73.9 FOLLICULITIS: Primary | ICD-10-CM

## 2025-05-16 PROCEDURE — 25000003 PHARM REV CODE 250: Performed by: EMERGENCY MEDICINE

## 2025-05-16 PROCEDURE — 99283 EMERGENCY DEPT VISIT LOW MDM: CPT

## 2025-05-16 RX ORDER — AMOXICILLIN AND CLAVULANATE POTASSIUM 875; 125 MG/1; MG/1
1 TABLET, FILM COATED ORAL 2 TIMES DAILY
Qty: 14 TABLET | Refills: 0 | Status: SHIPPED | OUTPATIENT
Start: 2025-05-16

## 2025-05-16 RX ORDER — AMOXICILLIN AND CLAVULANATE POTASSIUM 875; 125 MG/1; MG/1
1 TABLET, FILM COATED ORAL
Status: COMPLETED | OUTPATIENT
Start: 2025-05-16 | End: 2025-05-16

## 2025-05-16 RX ORDER — ACETAMINOPHEN 500 MG
1000 TABLET ORAL
Status: COMPLETED | OUTPATIENT
Start: 2025-05-16 | End: 2025-05-16

## 2025-05-16 RX ADMIN — AMOXICILLIN AND CLAVULANATE POTASSIUM 1 TABLET: 875; 125 TABLET, FILM COATED ORAL at 03:05

## 2025-05-16 RX ADMIN — ACETAMINOPHEN 1000 MG: 500 TABLET, FILM COATED ORAL at 03:05

## 2025-05-16 NOTE — ED PROVIDER NOTES
Encounter Date: 5/16/2025       History     Chief Complaint   Patient presents with    Abscess    Headache     Pt states having bilateral axillary boils that he notices since yesterday and they are painful. Pt also complains of a headache      35-year-old male here endorsing pain to the bilateral axillae.  Patient reporting mild headache.  Patient denies fever, chills, nausea, vomiting.  Hemodynamic parameters normal on arrival in the ED. patient is alert, communicative, appropriate.        Review of patient's allergies indicates:  No Known Allergies  Past Medical History:   Diagnosis Date    Deaf     Hypertension     Migraines     Seizures      Past Surgical History:   Procedure Laterality Date    ABDOMINAL SURGERY      IMPLANTATION OF COCHLEAR PROSTHESIS      INNER EAR SURGERY Right      Family History   Problem Relation Name Age of Onset    Hypertension Mother       Social History[1]  Review of Systems    Physical Exam     Initial Vitals [05/16/25 0327]   BP Pulse Resp Temp SpO2   123/84 83 18 97.5 °F (36.4 °C) 98 %      MAP       --         Physical Exam    Nursing note and vitals reviewed.  Constitutional: He appears well-developed and well-nourished. He is not diaphoretic. No distress.   HENT:   Head: Normocephalic and atraumatic.   Eyes: EOM are normal. Pupils are equal, round, and reactive to light. Right eye exhibits no discharge. Left eye exhibits no discharge.   Neck: Neck supple. No thyromegaly present. No tracheal deviation present. No JVD present.   Normal range of motion.  Cardiovascular:  Normal rate, regular rhythm, normal heart sounds and intact distal pulses.           No murmur heard.  Pulmonary/Chest: Breath sounds normal. No stridor. No respiratory distress. He has no wheezes. He has no rhonchi. He has no rales.   Abdominal: Abdomen is soft. He exhibits no distension. There is no abdominal tenderness. There is no rebound and no guarding.   Musculoskeletal:         General: No tenderness or  edema. Normal range of motion.      Cervical back: Normal range of motion and neck supple.     Neurological: He is alert and oriented to person, place, and time. He has normal strength. No cranial nerve deficit. GCS score is 15. GCS eye subscore is 4. GCS verbal subscore is 5. GCS motor subscore is 6.   Skin: Skin is warm. Capillary refill takes less than 2 seconds. No rash and no abscess noted. There is erythema. No pallor.   Mild folliculitis, bilateral axillae;   Psychiatric: He has a normal mood and affect. His behavior is normal. Judgment and thought content normal.         ED Course   Procedures  Labs Reviewed - No data to display       Imaging Results    None          Medications   amoxicillin-clavulanate 875-125mg per tablet 1 tablet (has no administration in time range)   acetaminophen tablet 1,000 mg (has no administration in time range)     Medical Decision Making  Risk  OTC drugs.  Prescription drug management.  Risk Details: Mild folliculitis.  No features concerning for systemic inflammatory reaction.                                      Clinical Impression:  Final diagnoses:  [L73.9] Folliculitis (Primary)          ED Disposition Condition    Discharge Stable          ED Prescriptions       Medication Sig Dispense Start Date End Date Auth. Provider    amoxicillin-clavulanate 875-125mg (AUGMENTIN) 875-125 mg per tablet Take 1 tablet by mouth 2 (two) times daily. 14 tablet 5/16/2025 -- Mc Juan MD          Follow-up Information       Follow up With Specialties Details Why Contact Info    [facility] Emergency Medicine  As needed, If symptoms worsen [address] [facility] [address] [phone]    Solo Robison, DO Internal Medicine Call   [address] [facility] [address] [phone]                 [1]   Social History  Tobacco Use    Smoking status: Every Day     Average packs/day: 1 pack/day for 5.0 years (5.0 ttl pk-yrs)     Types:  Cigarettes     Start date: 2010    Smokeless tobacco: Never   Substance Use Topics    Alcohol use: Not Currently    Drug use: Never        Mc Juan MD  05/16/25 0358

## 2025-05-16 NOTE — Clinical Note
"Chun Patel" Jessicadavidsourav was seen and treated in our emergency department on 5/16/2025.  He may return to work on 05/17/2025.       If you have any questions or concerns, please don't hesitate to call.       RN    "

## 2025-06-04 ENCOUNTER — HOSPITAL ENCOUNTER (EMERGENCY)
Facility: HOSPITAL | Age: 35
Discharge: HOME | End: 2025-06-04
Attending: INTERNAL MEDICINE
Payer: COMMERCIAL

## 2025-06-04 VITALS
HEIGHT: 69 IN | OXYGEN SATURATION: 100 % | SYSTOLIC BLOOD PRESSURE: 121 MMHG | TEMPERATURE: 98 F | DIASTOLIC BLOOD PRESSURE: 87 MMHG | WEIGHT: 170 LBS | RESPIRATION RATE: 13 BRPM | HEART RATE: 68 BPM | BODY MASS INDEX: 25.18 KG/M2

## 2025-06-04 DIAGNOSIS — F41.9 ANXIETY: Primary | ICD-10-CM

## 2025-06-04 LAB
ALBUMIN SERPL-MCNC: 4 G/DL (ref 3.5–5)
ALBUMIN/GLOB SERPL: 1.3 RATIO (ref 1.1–2)
ALP SERPL-CCNC: 58 UNIT/L (ref 40–150)
ALT SERPL-CCNC: 10 UNIT/L (ref 0–55)
ANION GAP SERPL CALC-SCNC: 8 MEQ/L
AST SERPL-CCNC: 16 UNIT/L (ref 11–45)
BACTERIA #/AREA URNS AUTO: ABNORMAL /HPF
BASOPHILS # BLD AUTO: 0.01 X10(3)/MCL
BASOPHILS NFR BLD AUTO: 0.2 %
BILIRUB SERPL-MCNC: 0.5 MG/DL
BILIRUB UR QL STRIP.AUTO: NEGATIVE
BUN SERPL-MCNC: 8.4 MG/DL (ref 8.9–20.6)
CALCIUM SERPL-MCNC: 8.9 MG/DL (ref 8.4–10.2)
CHLORIDE SERPL-SCNC: 109 MMOL/L (ref 98–107)
CLARITY UR: CLEAR
CO2 SERPL-SCNC: 25 MMOL/L (ref 22–29)
COLOR UR AUTO: YELLOW
CREAT SERPL-MCNC: 0.83 MG/DL (ref 0.72–1.25)
CREAT/UREA NIT SERPL: 10
EOSINOPHIL # BLD AUTO: 0.08 X10(3)/MCL (ref 0–0.9)
EOSINOPHIL NFR BLD AUTO: 2 %
ERYTHROCYTE [DISTWIDTH] IN BLOOD BY AUTOMATED COUNT: 12.2 % (ref 11.5–17)
FLUAV AG UPPER RESP QL IA.RAPID: NOT DETECTED
FLUBV AG UPPER RESP QL IA.RAPID: NOT DETECTED
GFR SERPLBLD CREATININE-BSD FMLA CKD-EPI: >60 ML/MIN/1.73/M2
GLOBULIN SER-MCNC: 3 GM/DL (ref 2.4–3.5)
GLUCOSE SERPL-MCNC: 115 MG/DL (ref 74–100)
GLUCOSE UR QL STRIP: NORMAL
HCT VFR BLD AUTO: 39.1 % (ref 42–52)
HGB BLD-MCNC: 13.4 G/DL (ref 14–18)
HGB UR QL STRIP: NEGATIVE
HOLD SPECIMEN: NORMAL
HOLD SPECIMEN: NORMAL
HYALINE CASTS #/AREA URNS LPF: ABNORMAL /LPF
IMM GRANULOCYTES # BLD AUTO: 0 X10(3)/MCL (ref 0–0.04)
IMM GRANULOCYTES NFR BLD AUTO: 0 %
KETONES UR QL STRIP: ABNORMAL
LEUKOCYTE ESTERASE UR QL STRIP: NEGATIVE
LIPASE SERPL-CCNC: 10 U/L
LYMPHOCYTES # BLD AUTO: 2.02 X10(3)/MCL (ref 0.6–4.6)
LYMPHOCYTES NFR BLD AUTO: 50 %
MAGNESIUM SERPL-MCNC: 1.7 MG/DL (ref 1.6–2.6)
MCH RBC QN AUTO: 30.9 PG (ref 27–31)
MCHC RBC AUTO-ENTMCNC: 34.3 G/DL (ref 33–36)
MCV RBC AUTO: 90.1 FL (ref 80–94)
MONOCYTES # BLD AUTO: 0.27 X10(3)/MCL (ref 0.1–1.3)
MONOCYTES NFR BLD AUTO: 6.7 %
MUCOUS THREADS URNS QL MICRO: ABNORMAL /LPF
NEUTROPHILS # BLD AUTO: 1.66 X10(3)/MCL (ref 2.1–9.2)
NEUTROPHILS NFR BLD AUTO: 41.1 %
NITRITE UR QL STRIP: NEGATIVE
NRBC BLD AUTO-RTO: 0 %
OHS QRS DURATION: 84 MS
OHS QTC CALCULATION: 405 MS
PH UR STRIP: 6.5 [PH]
PHOSPHATE SERPL-MCNC: 3 MG/DL (ref 2.3–4.7)
PLATELET # BLD AUTO: 184 X10(3)/MCL (ref 130–400)
PMV BLD AUTO: 11.1 FL (ref 7.4–10.4)
POTASSIUM SERPL-SCNC: 3.5 MMOL/L (ref 3.5–5.1)
PROT SERPL-MCNC: 7 GM/DL (ref 6.4–8.3)
PROT UR QL STRIP: ABNORMAL
RBC # BLD AUTO: 4.34 X10(6)/MCL (ref 4.7–6.1)
RBC #/AREA URNS AUTO: ABNORMAL /HPF
RSV A 5' UTR RNA NPH QL NAA+PROBE: NOT DETECTED
SARS-COV-2 RNA RESP QL NAA+PROBE: NOT DETECTED
SODIUM SERPL-SCNC: 142 MMOL/L (ref 136–145)
SP GR UR STRIP.AUTO: 1.03 (ref 1–1.03)
SQUAMOUS #/AREA URNS LPF: ABNORMAL /HPF
STREP A PCR (OHS): NOT DETECTED
TROPONIN I SERPL-MCNC: <0.01 NG/ML (ref 0–0.04)
TSH SERPL-ACNC: 0.56 UIU/ML (ref 0.35–4.94)
UROBILINOGEN UR STRIP-ACNC: ABNORMAL
WBC # BLD AUTO: 4.04 X10(3)/MCL (ref 4.5–11.5)
WBC #/AREA URNS AUTO: ABNORMAL /HPF

## 2025-06-04 PROCEDURE — 84443 ASSAY THYROID STIM HORMONE: CPT

## 2025-06-04 PROCEDURE — 85025 COMPLETE CBC W/AUTO DIFF WBC: CPT

## 2025-06-04 PROCEDURE — 80053 COMPREHEN METABOLIC PANEL: CPT

## 2025-06-04 PROCEDURE — 84484 ASSAY OF TROPONIN QUANT: CPT

## 2025-06-04 PROCEDURE — 83735 ASSAY OF MAGNESIUM: CPT

## 2025-06-04 PROCEDURE — 81001 URINALYSIS AUTO W/SCOPE: CPT

## 2025-06-04 PROCEDURE — 0241U COVID/RSV/FLU A&B PCR: CPT

## 2025-06-04 PROCEDURE — 93005 ELECTROCARDIOGRAM TRACING: CPT

## 2025-06-04 PROCEDURE — 87651 STREP A DNA AMP PROBE: CPT

## 2025-06-04 PROCEDURE — 99285 EMERGENCY DEPT VISIT HI MDM: CPT | Mod: 25

## 2025-06-04 PROCEDURE — 83690 ASSAY OF LIPASE: CPT

## 2025-06-04 PROCEDURE — 84100 ASSAY OF PHOSPHORUS: CPT

## 2025-06-04 RX ORDER — HYDROXYZINE HYDROCHLORIDE 25 MG/1
25 TABLET, FILM COATED ORAL 3 TIMES DAILY PRN
Qty: 12 TABLET | Refills: 0 | Status: SHIPPED | OUTPATIENT
Start: 2025-06-04

## 2025-06-04 NOTE — Clinical Note
"Chun Patel" Amado was seen and treated in our emergency department on 6/4/2025.  He may return to work on 06/05/2025.       If you have any questions or concerns, please don't hesitate to call.      ISAIAH WISE    "

## 2025-06-04 NOTE — DISCHARGE INSTRUCTIONS
Your labs look good. Your EKG of your heart is normal. Your electrolytes are normal. Your swabs are negative for COVID, Flu, RSV, strep. Your urine does not look infected.     Your symptoms of episodic palpitations, blurry vision, and chest tightness that is accompanied by anxiety may be due to anxiety or panic attacks. It is important to follow-up with your PCP for further work-up and management.       It is important that you follow up with your primary care provider or specialist if indicated for further evaluation, workup, and treatment as necessary. The exam and treatment you received in Emergency Department was for an urgent problem and NOT INTENDED AS COMPLETE CARE. It is important that you FOLLOW UP with a doctor for ongoing care. If your symptoms become WORSE or you DO NOT IMPROVE and you are unable to reach your health care provider, you should RETURN to the Emergency Department. The Emergency Department provider has provided a PRELIMINARY INTERPRETATION of all your studies. A final interpretation may be done after you are discharged. If a change in your diagnosis or treatment is needed WE WILL CONTACT YOU. It is critical that we have a CURRENT PHONE NUMBER FOR YOU.

## 2025-06-05 ENCOUNTER — HOSPITAL ENCOUNTER (EMERGENCY)
Facility: HOSPITAL | Age: 35
Discharge: HOME | End: 2025-06-05
Attending: INTERNAL MEDICINE
Payer: COMMERCIAL

## 2025-06-05 VITALS
WEIGHT: 170 LBS | HEIGHT: 69 IN | RESPIRATION RATE: 15 BRPM | BODY MASS INDEX: 25.18 KG/M2 | DIASTOLIC BLOOD PRESSURE: 91 MMHG | TEMPERATURE: 98 F | OXYGEN SATURATION: 99 % | SYSTOLIC BLOOD PRESSURE: 128 MMHG | HEART RATE: 63 BPM

## 2025-06-05 DIAGNOSIS — F41.9 ANXIETY: Primary | ICD-10-CM

## 2025-06-05 DIAGNOSIS — R07.9 CHEST PAIN: ICD-10-CM

## 2025-06-05 LAB
D DIMER PPP IA.FEU-MCNC: <0.27 UG/ML FEU (ref 0–0.5)
HOLD SPECIMEN: NORMAL
TROPONIN I SERPL-MCNC: <0.01 NG/ML (ref 0–0.04)
TSH SERPL-ACNC: 0.53 UIU/ML (ref 0.35–4.94)

## 2025-06-05 PROCEDURE — 85379 FIBRIN DEGRADATION QUANT: CPT | Performed by: INTERNAL MEDICINE

## 2025-06-05 PROCEDURE — 93005 ELECTROCARDIOGRAM TRACING: CPT

## 2025-06-05 PROCEDURE — 84484 ASSAY OF TROPONIN QUANT: CPT | Performed by: INTERNAL MEDICINE

## 2025-06-05 PROCEDURE — 99285 EMERGENCY DEPT VISIT HI MDM: CPT | Mod: 25

## 2025-06-05 PROCEDURE — 84443 ASSAY THYROID STIM HORMONE: CPT | Performed by: INTERNAL MEDICINE

## 2025-06-05 RX ORDER — HYDROXYZINE PAMOATE 25 MG/1
25 CAPSULE ORAL EVERY 6 HOURS PRN
Qty: 20 CAPSULE | Refills: 1 | Status: SHIPPED | OUTPATIENT
Start: 2025-06-05

## 2025-06-05 NOTE — ED PROVIDER NOTES
Encounter Date: 6/4/2025       History     Chief Complaint   Patient presents with    Fatigue     Per EMS, patient had syncopal episode at [employer] PTA. C/o generalized weakness x 4 days.      A 35 y.o. male patient with a history of HTN, migraines, seizures presents to the ED with episodic fatigue, palpitations, chest tightness, SOB, and blurry vision. The onset is PTA while in a department store. Patient denies syncope but states he felt like he was going to pass out. Patient states that he has been having these episodes for the past 3 days. He states he feels very anxious when it happens. Patient denies any symptoms currently and states that shortly after arrival his symptoms resolved. Denies any fever, chills, N/V, abdominal pain, urinary symptoms, changes in BM.       The history is provided by the patient.     Review of patient's allergies indicates:  No Known Allergies  Past Medical History:   Diagnosis Date    Deaf     Hypertension     Migraines     Seizures      Past Surgical History:   Procedure Laterality Date    ABDOMINAL SURGERY      IMPLANTATION OF COCHLEAR PROSTHESIS      INNER EAR SURGERY Right      Family History   Problem Relation Name Age of Onset    Hypertension Mother       Social History[1]  Review of Systems   Constitutional:  Negative for chills and fever.   Eyes:  Positive for visual disturbance. Negative for photophobia, pain, discharge, redness and itching.   Respiratory:  Positive for chest tightness and shortness of breath.    Cardiovascular:  Positive for palpitations. Negative for chest pain and leg swelling.   Gastrointestinal:  Negative for nausea and vomiting.   Genitourinary:  Negative for difficulty urinating and dysuria.   Musculoskeletal:  Negative for arthralgias.   Skin:  Negative for color change and rash.   Neurological:  Negative for dizziness, syncope, facial asymmetry, speech difficulty, weakness, light-headedness and headaches.   Hematological:  Does not  bruise/bleed easily.   Psychiatric/Behavioral:  Negative for confusion. The patient is nervous/anxious.    All other systems reviewed and are negative.      Physical Exam     Initial Vitals [06/04/25 1337]   BP Pulse Resp Temp SpO2   125/76 72 18 97.7 °F (36.5 °C) 97 %      MAP       --         Physical Exam    Nursing note and vitals reviewed.  Constitutional: He appears well-developed and well-nourished.   HENT:   Head: Normocephalic and atraumatic.   Right Ear: External ear normal.   Left Ear: External ear normal.   Nose: Nose normal.   Eyes: Conjunctivae and EOM are normal.   Neck: Neck supple.   Cardiovascular:  Normal rate, regular rhythm, normal heart sounds and intact distal pulses.     Exam reveals no gallop and no friction rub.       No murmur heard.  Pulmonary/Chest: Breath sounds normal. No respiratory distress. He has no wheezes. He has no rhonchi. He has no rales.   Abdominal: He exhibits no distension.   Musculoskeletal:      Cervical back: Neck supple.     Neurological: He is alert and oriented to person, place, and time. He has normal strength. No cranial nerve deficit or sensory deficit. GCS score is 15. GCS eye subscore is 4. GCS verbal subscore is 5. GCS motor subscore is 6.   Skin: Skin is warm and dry. Capillary refill takes less than 2 seconds.         ED Course   Procedures  Labs Reviewed   COMPREHENSIVE METABOLIC PANEL - Abnormal       Result Value    Sodium 142      Potassium 3.5      Chloride 109 (*)     CO2 25      Glucose 115 (*)     Blood Urea Nitrogen 8.4 (*)     Creatinine 0.83      Calcium 8.9      Protein Total 7.0      Albumin 4.0      Globulin 3.0      Albumin/Globulin Ratio 1.3      Bilirubin Total 0.5      ALP 58      ALT 10      AST 16      eGFR >60      Anion Gap 8.0      BUN/Creatinine Ratio 10     URINALYSIS, REFLEX TO URINE CULTURE - Abnormal    Color, UA Yellow      Appearance, UA Clear      Specific Gravity, UA 1.030      pH, UA 6.5      Protein, UA Trace (*)     Glucose,  UA Normal      Ketones, UA Trace (*)     Blood, UA Negative      Bilirubin, UA Negative      Urobilinogen, UA 3+ (*)     Nitrites, UA Negative      Leukocyte Esterase, UA Negative      RBC, UA None Seen      WBC, UA 0-5      Bacteria, UA None Seen      Squamous Epithelial Cells, UA None Seen      Mucous, UA Trace (*)     Hyaline Casts, UA None Seen     CBC WITH DIFFERENTIAL - Abnormal    WBC 4.04 (*)     RBC 4.34 (*)     Hgb 13.4 (*)     Hct 39.1 (*)     MCV 90.1      MCH 30.9      MCHC 34.3      RDW 12.2      Platelet 184      MPV 11.1 (*)     Neut % 41.1      Lymph % 50.0      Mono % 6.7      Eos % 2.0      Basophil % 0.2      Imm Grans % 0.0      Neut # 1.66 (*)     Lymph # 2.02      Mono # 0.27      Eos # 0.08      Baso # 0.01      Imm Gran # 0.00      NRBC% 0.0     TSH - Normal    TSH 0.560     TROPONIN I - Normal    Troponin-I <0.010     MAGNESIUM - Normal    Magnesium Level 1.70     PHOSPHORUS - Normal    Phosphorus Level 3.0     COVID/RSV/FLU A&B PCR - Normal    Influenza A PCR Not Detected      Influenza B PCR Not Detected      Respiratory Syncytial Virus PCR Not Detected      SARS-CoV-2 PCR Not Detected      Narrative:     The Xpert Xpress SARS-CoV-2/FLU/RSV plus is a rapid, multiplexed real-time PCR test intended for the simultaneous qualitative detection and differentiation of SARS-CoV-2, Influenza A, Influenza B, and respiratory syncytial virus (RSV) viral RNA in either nasopharyngeal swab or nasal swab specimens.         STREP GROUP A BY PCR - Normal    STREP A PCR ([facility]) Not Detected      Narrative:     The Xpert Xpress Strep A test is a rapid, qualitative in vitro diagnostic test for the detection of Streptococcus pyogenes (Group A ß-hemolytic Streptococcus, Strep A) in throat swab specimens from patients with signs and symptoms of pharyngitis.     LIPASE - Normal    Lipase Level 10     CBC W/ AUTO DIFFERENTIAL    Narrative:     The following orders were created for panel order CBC Auto  Differential.  Procedure                               Abnormality         Status                     ---------                               -----------         ------                     CBC with Differential[[id number]]       Abnormal            Final result                 Please view results for these tests on the individual orders.   RED TOP HOLD    Extra Tube Hold for add-ons.     PINK TOP HOLD    Extra Tube Hold for add-ons.     EXTRA TUBES    Narrative:     The following orders were created for panel order EXTRA TUBES.  Procedure                               Abnormality         Status                     ---------                               -----------         ------                     Light Blue Top Hold[[id number]]                                                        Red Top Hold[[id number]]                                    Final result               Pink Top Hold[[id number]]                                   Final result                 Please view results for these tests on the individual orders.   LIGHT BLUE TOP HOLD     EKG Readings: (Independently Interpreted)   Rhythm: Normal Sinus Rhythm. Heart Rate: 67. Ectopy: No Ectopy. Conduction: Normal. ST Segments: Normal ST Segments. T Waves: Normal. Clinical Impression: Normal Sinus Rhythm     ECG Results              EKG 12-lead (Syncope) Age >50 (Final result)        Collection Time Result Time QRS Duration [facility] QTC Calculation    06/04/25 13:53:41 06/04/25 14:37:29 84 405                     Final result by Interface, Lab In [facility] (06/04/25 14:37:33)                   Narrative:    Test Reason : R55,    Vent. Rate :  67 BPM     Atrial Rate :  67 BPM     P-R Int : 158 ms          QRS Dur :  84 ms      QT Int : 384 ms       P-R-T Axes :  73  46  39 degrees    QTcB Int : 405 ms    Normal sinus rhythm  Normal ECG  When compared with ECG of 08-Feb-2025 23:23,  No significant change was found  Confirmed by Calin Douglas ([id number]) on 6/4/2025 2:37:26  PM    Referred By: YELENAERRAL SELF           Confirmed By: Calin Douglas                                  Imaging Results              X-Ray Chest PA And Lateral (Final result)  Result time 06/04/25 14:41:06      Final result by Mita Alvarado MD (06/04/25 14:41:06)                   Impression:      No acute abnormality of the chest.      Electronically signed by: Mita Alvarado  Date:    06/04/2025  Time:    14:41               Narrative:    EXAMINATION:  XR CHEST PA AND LATERAL    CLINICAL HISTORY:  Chest pain;    TECHNIQUE:  PA and lateral chest radiographs    COMPARISON:  Chest x-ray dated 11/18/2024    FINDINGS:  The heart is normal in size.  The lungs are clear.  There is no pleural effusion or visible pneumothorax.                                       Medications - No data to display  Medical Decision Making  A 35 y.o. male patient with a history of HTN, migraines, seizures presents to the ED with episodic fatigue, palpitations, chest tightness, SOB, and blurry vision. The onset is PTA while in a department store. Patient denies syncope but states he felt like he was going to pass out. Patient states that he has been having these episodes for the past 3 days. He states he feels very anxious when it happens. Patient denies any symptoms currently and states that shortly after arrival his symptoms resolved. Denies any fever, chills, N/V, abdominal pain, urinary symptoms, changes in BM.     Labs unremarkable.     Clinical impression:  Anxiety (Primary)    Patient is non-toxic appearing and tolerating nutritional intake. Patient's vital signs and clinical condition are stable for DC with ED Prescriptions     Medication Sig Dispense Start Date End Date Auth. Provider    hydrOXYzine HCL (ATARAX) 25 MG tablet Take 1 tablet (25 mg total) by   mouth 3 (three) times daily as needed for Anxiety. 12 tablet 6/4/2025 --   Tasia Glover PA         Follow-up: PCP or Internal medicine clinic within 3  days  Referrals made: none    Strict follow-up precautions given. Patient verbalizes understanding of treatment plan and ED return precautions.       Amount and/or Complexity of Data Reviewed  Labs: ordered. Decision-making details documented in ED Course.  Radiology: ordered. Decision-making details documented in ED Course.    Risk  Prescription drug management.  Risk Details: Given strict ED return precautions. I have spoken with the patient and/or caregivers. I have explained the patient's condition, diagnoses and treatment plan based on the information available to me at this time. I have answered the patient's and/or caregiver's questions and addressed any concerns. The patient and/or caregivers have as good an understanding of the patient's diagnosis, condition and treatment plan as can be expected at this point. The patient's condition is stable and appropriate for discharge from the emergency department.      The patient will pursue further outpatient evaluation with the primary care physician or other designated or consulting physician as outlined in the discharge instructions. The patient and/or caregivers are agreeable to this plan of care and follow-up instructions have been explained in detail. The patient and/or caregivers have received these instructions in written format and have expressed an understanding of the discharge instructions. The patient and/or caregivers are aware that any significant change in condition or worsening of symptoms should prompt an immediate return to this or the closest emergency department or a call to 911.               Additional MDM:   Differential Diagnosis:   Other: The following diagnoses were also considered and will be evaluated: ACS, costochrondritis and electrolyte disturbance.            ED Course as of 06/04/25 1906 Wed Jun 04, 2025   1441 WBC(!): 4.04 [AG]   1441 Hemoglobin(!): 13.4 [AG]   1445 X-Ray Chest PA And Lateral  No acute abnormality of the chest.    [AG]   1500 eGFR: >60 [AG]   1500 Phosphorus Level: 3.0 [AG]   1500 Magnesium : 1.70 [AG]   1500 Lipase: 10 [AG]   1503 Troponin I: <0.010 [AG]   1503 STREP A PCR ([facility]): Not Detected [AG]   1516 Influenza A, Molecular: Not Detected [AG]   1516 Influenza B, Molecular: Not Detected [AG]   1520 TSH: 0.560 [AG]   1611 Bacteria, UA: None Seen [AG]      ED Course User Index  [AG] Tasia Glover PA                           Clinical Impression:  Final diagnoses:  [F41.9] Anxiety (Primary)          ED Disposition Condition    Discharge Stable          ED Prescriptions       Medication Sig Dispense Start Date End Date Auth. Provider    hydrOXYzine HCL (ATARAX) 25 MG tablet Take 1 tablet (25 mg total) by mouth 3 (three) times daily as needed for Anxiety. 12 tablet 6/4/2025 -- Tasia Glover PA          Follow-up Information       Follow up With Specialties Details Why Contact Info    [facility] Emergency Medicine Go to  If symptoms worsen, As needed [address] [facility] [id number] [phone]    Solo Robison DO Internal Medicine   [address] [facility] [address] [phone]                     [1]   Social History  Tobacco Use    Smoking status: Every Day     Average packs/day: 1 pack/day for 5.0 years (5.0 ttl pk-yrs)     Types: Cigarettes     Start date: 2010    Smokeless tobacco: Never   Substance Use Topics    Alcohol use: Not Currently    Drug use: Never        Tasia Glover PA  06/04/25 7314

## 2025-06-05 NOTE — ED PROVIDER NOTES
Encounter Date: 6/5/2025       History     Chief Complaint   Patient presents with    Chest Pain     Left sided chest pain started yesterday. Crying in pain. Denies vomiting.      Presents by EMS after experiencing chest pain associated to palpitations and weakness. States feeling his heart beating fast, associated to SOB.  States feeling very weak and tired. Denies taking medications.     The history is provided by the patient and the EMS personnel. The history is limited by a language barrier. A  was used.     Review of patient's allergies indicates:  No Known Allergies  Past Medical History:   Diagnosis Date    Deaf     Hypertension     Migraines     Seizures      Past Surgical History:   Procedure Laterality Date    ABDOMINAL SURGERY      IMPLANTATION OF COCHLEAR PROSTHESIS      INNER EAR SURGERY Right      Family History   Problem Relation Name Age of Onset    Hypertension Mother       Social History[1]  Review of Systems   Respiratory:  Positive for shortness of breath.    Cardiovascular:  Positive for chest pain and palpitations.   Neurological:  Positive for dizziness and weakness.       Physical Exam     Initial Vitals [06/05/25 1241]   BP Pulse Resp Temp SpO2   131/88 81 18 97.9 °F (36.6 °C) 99 %      MAP       --         Physical Exam    Nursing note and vitals reviewed.  Constitutional: He appears well-developed and well-nourished. No distress.   HENT:   Head: Normocephalic and atraumatic. Mouth/Throat: Oropharynx is clear and moist.   Eyes: Conjunctivae and EOM are normal. Pupils are equal, round, and reactive to light.   Neck: Neck supple. Thyromegaly present. No tracheal deviation present. No JVD present.   Normal range of motion.  Cardiovascular:  Normal rate, regular rhythm, normal heart sounds and intact distal pulses.           Pulmonary/Chest: Breath sounds normal. No stridor. No respiratory distress.   Abdominal: Abdomen is soft. Bowel sounds are normal. He exhibits no  distension. There is no abdominal tenderness. There is no rebound and no guarding.   Musculoskeletal:         General: No edema. Normal range of motion.      Cervical back: Normal range of motion and neck supple.     Neurological: He is alert and oriented to person, place, and time. GCS score is 15. GCS eye subscore is 4. GCS verbal subscore is 5. GCS motor subscore is 6.   Skin: Skin is warm and dry. No rash noted.   Psychiatric: Thought content normal.         ED Course   Procedures  Labs Reviewed   TROPONIN I - Normal       Result Value    Troponin-I <0.010     D DIMER, QUANTITATIVE - Normal    D-Dimer <0.27     TSH - Normal    TSH 0.534     EXTRA TUBES    Narrative:     The following orders were created for panel order EXTRA TUBES.  Procedure                               Abnormality         Status                     ---------                               -----------         ------                     Red Top Hold[[id number]]                                    In process                 Lavender Top Hold[[id number]]                               In process                 Pink Top Hold[[id number]]                                   In process                   Please view results for these tests on the individual orders.   RED TOP HOLD   LAVENDER TOP HOLD   PINK TOP HOLD     EKG Readings: (Independently Interpreted)   Initial Reading: No STEMI. Rhythm: Normal Sinus Rhythm. Heart Rate: 82. Ectopy: No Ectopy. Conduction: Normal. ST Segments: Normal ST Segments. T Waves: Normal. Axis: Normal. Clinical Impression: Normal Sinus Rhythm     ECG Results              EKG 12-lead (In process)        Collection Time Result Time QRS Duration [facility] QTC Calculation    06/05/25 12:38:09 06/05/25 12:47:51 86 406                     In process by Interface, Lab In [facility] (06/05/25 12:48:00)                   Narrative:    Test Reason : R07.9,    Vent. Rate :  83 BPM     Atrial Rate :  83 BPM     P-R Int : 152 ms          QRS Dur  :  86 ms      QT Int : 346 ms       P-R-T Axes :  73  42  28 degrees    QTcB Int : 406 ms    Normal sinus rhythm  Normal ECG  When compared with ECG of 04-Jun-2025 13:53,  No significant change was found    Referred By: AAAREFERRAL SELF           Confirmed By:                                   Imaging Results              X-Ray Chest PA And Lateral (Final result)  Result time 06/05/25 13:12:13      Final result by Cosme Edge MD (06/05/25 13:12:13)                   Impression:      No acute cardiopulmonary abnormality.      Electronically signed by: Cosme Edge  Date:    06/05/2025  Time:    13:12               Narrative:    EXAMINATION:  XR CHEST PA AND LATERAL    CLINICAL HISTORY:  Chest pain, unspecified    COMPARISON:  Yesterday    FINDINGS:  PA and lateral views of the chest were obtained. Heart and mediastinum within normal limits. The lungs are clear. No pneumothorax or significant effusion.                                       Medications - No data to display  Medical Decision Making  Amount and/or Complexity of Data Reviewed  External Data Reviewed: labs.     Details: 06/04/25 14:17  WBC: 4.04 (L)  RBC: 4.34 (L)  Hemoglobin: 13.4 (L)  Hematocrit: 39.1 (L)  MCV: 90.1  MCH: 30.9  MCHC: 34.3  RDW: 12.2  Platelet Count: 184  MPV: 11.1 (H)  Neut %: 41.1  LYMPH %: 50.0  Mono %: 6.7  Eos %: 2.0  Basophil %: 0.2  Immature Granulocytes: 0.0  Neut #: 1.66 (L)  Lymph #: 2.02  Mono #: 0.27  Eos #: 0.08  Baso #: 0.01  Immature Grans (Abs): 0.00  nRBC: 0.0  Sodium: 142  Potassium: 3.5  Chloride: 109 (H)  CO2: 25  Anion Gap: 8.0  BUN: 8.4 (L)  Creatinine: 0.83  BUN/CREAT RATIO: 10  eGFR: >60  Glucose: 115 (H)  Calcium: 8.9  Phosphorus Level: 3.0  Magnesium : 1.70  ALP: 58  PROTEIN TOTAL: 7.0  Albumin: 4.0  Albumin/Globulin Ratio: 1.3  BILIRUBIN TOTAL: 0.5  AST: 16  ALT: 10  Lipase: 10  Globulin, Total: 3.0  Troponin I: <0.010  TSH: 0.560      (L): Data is abnormally low  (H): Data is abnormally high  Labs:  ordered. Decision-making details documented in ED Course.  Radiology: ordered and independent interpretation performed. Decision-making details documented in ED Course.  ECG/medicine tests: ordered and independent interpretation performed. Decision-making details documented in ED Course.    Risk  Prescription drug management.      Additional MDM:   Differential Diagnosis:   Paroxysmal A Fib, WPW, Hyperthyroidism, Drug reaction, MI, among others                                      Clinical Impression:  Final diagnoses:  [R07.9] Chest pain  [F41.9] Anxiety (Primary)          ED Disposition Condition    Discharge Stable          ED Prescriptions       Medication Sig Dispense Start Date End Date Auth. Provider    hydrOXYzine pamoate (VISTARIL) 25 MG Cap Take 1 capsule (25 mg total) by mouth every 6 (six) hours as needed (Anxiety). 20 capsule 6/5/2025 -- Gonzalo Coates MD          Follow-up Information       Follow up With Specialties Details Why Contact Info    Solo Robison DO Internal Medicine Schedule an appointment as soon as possible for a visit in 2 weeks  [address] [facility] [address]  [phone]      [facility] Emergency Medicine Go to  If symptoms worsen [address] [facility] [phone]                   [1]   Social History  Tobacco Use    Smoking status: Every Day     Average packs/day: 1 pack/day for 5.0 years (5.0 ttl pk-yrs)     Types: Cigarettes     Start date: 2010    Smokeless tobacco: Never   Substance Use Topics    Alcohol use: Not Currently    Drug use: Never        Gonzalo Coates MD  06/05/25 8844

## 2025-06-05 NOTE — Clinical Note
"Chun Patel" Amado was seen and treated in our emergency department on 6/5/2025.  He may return to work on 06/06/2025.       If you have any questions or concerns, please don't hesitate to call.      ISAIAH WISE    "

## 2025-06-06 LAB
OHS QRS DURATION: 86 MS
OHS QTC CALCULATION: 406 MS

## 2025-06-16 ENCOUNTER — TELEPHONE (OUTPATIENT)
Dept: INTERNAL MEDICINE | Facility: CLINIC | Age: 35
End: 2025-06-16
Payer: COMMERCIAL

## 2025-06-16 NOTE — TELEPHONE ENCOUNTER
Patient is requesting some smoking patches to help with smoking cessation. Please review and advise.

## 2025-06-19 ENCOUNTER — TELEPHONE (OUTPATIENT)
Dept: INTERNAL MEDICINE | Facility: CLINIC | Age: 35
End: 2025-06-19
Payer: COMMERCIAL

## 2025-06-19 DIAGNOSIS — F17.200 TOBACCO USE DISORDER: Primary | ICD-10-CM

## 2025-06-26 ENCOUNTER — TELEPHONE (OUTPATIENT)
Dept: INTERNAL MEDICINE | Facility: CLINIC | Age: 35
End: 2025-06-26
Payer: COMMERCIAL

## 2025-06-26 RX ORDER — IBUPROFEN 200 MG
1 TABLET ORAL DAILY
Qty: 30 PATCH | Refills: 0 | Status: SHIPPED | OUTPATIENT
Start: 2025-06-26

## 2025-06-26 NOTE — TELEPHONE ENCOUNTER
Patient requested nicotine patches, prescription sent to the pharmacy. Previous physician is no longer at this facility.     Radha Hernandez MD

## 2025-07-02 ENCOUNTER — OFFICE VISIT (OUTPATIENT)
Dept: INTERNAL MEDICINE | Facility: CLINIC | Age: 35
End: 2025-07-02
Payer: COMMERCIAL

## 2025-07-02 ENCOUNTER — LAB VISIT (OUTPATIENT)
Dept: LAB | Facility: HOSPITAL | Age: 35
End: 2025-07-02
Payer: COMMERCIAL

## 2025-07-02 VITALS
DIASTOLIC BLOOD PRESSURE: 81 MMHG | BODY MASS INDEX: 24.81 KG/M2 | OXYGEN SATURATION: 98 % | HEART RATE: 75 BPM | RESPIRATION RATE: 16 BRPM | TEMPERATURE: 98 F | WEIGHT: 168 LBS | SYSTOLIC BLOOD PRESSURE: 119 MMHG

## 2025-07-02 DIAGNOSIS — R79.89 OTHER SPECIFIED ABNORMAL FINDINGS OF BLOOD CHEMISTRY: ICD-10-CM

## 2025-07-02 DIAGNOSIS — G47.00 INSOMNIA, UNSPECIFIED TYPE: ICD-10-CM

## 2025-07-02 DIAGNOSIS — Z00.00 HEALTHCARE MAINTENANCE: Primary | ICD-10-CM

## 2025-07-02 DIAGNOSIS — F25.9 SCHIZOAFFECTIVE DISORDER, UNSPECIFIED TYPE: ICD-10-CM

## 2025-07-02 DIAGNOSIS — Z00.00 HEALTHCARE MAINTENANCE: ICD-10-CM

## 2025-07-02 LAB
ALBUMIN SERPL-MCNC: 4.5 G/DL (ref 3.5–5)
ALBUMIN/GLOB SERPL: 1.4 RATIO (ref 1.1–2)
ALP SERPL-CCNC: 64 UNIT/L (ref 40–150)
ALT SERPL-CCNC: 14 UNIT/L (ref 0–55)
ANION GAP SERPL CALC-SCNC: 6 MEQ/L
AST SERPL-CCNC: 22 UNIT/L (ref 11–45)
BASOPHILS # BLD AUTO: 0.01 X10(3)/MCL
BASOPHILS NFR BLD AUTO: 0.2 %
BILIRUB SERPL-MCNC: 0.6 MG/DL
BUN SERPL-MCNC: 13.8 MG/DL (ref 8.9–20.6)
CALCIUM SERPL-MCNC: 9.3 MG/DL (ref 8.4–10.2)
CHLORIDE SERPL-SCNC: 107 MMOL/L (ref 98–107)
CHOLEST SERPL-MCNC: 162 MG/DL
CHOLEST/HDLC SERPL: 3 {RATIO} (ref 0–5)
CO2 SERPL-SCNC: 27 MMOL/L (ref 22–29)
CREAT SERPL-MCNC: 0.77 MG/DL (ref 0.72–1.25)
CREAT/UREA NIT SERPL: 18
EOSINOPHIL # BLD AUTO: 0.12 X10(3)/MCL (ref 0–0.9)
EOSINOPHIL NFR BLD AUTO: 2.5 %
ERYTHROCYTE [DISTWIDTH] IN BLOOD BY AUTOMATED COUNT: 12.1 % (ref 11.5–17)
EST. AVERAGE GLUCOSE BLD GHB EST-MCNC: 105.4 MG/DL
GFR SERPLBLD CREATININE-BSD FMLA CKD-EPI: >60 ML/MIN/1.73/M2
GLOBULIN SER-MCNC: 3.3 GM/DL (ref 2.4–3.5)
GLUCOSE SERPL-MCNC: 85 MG/DL (ref 74–100)
HBA1C MFR BLD: 5.3 %
HBA1C MFR BLD: 5.5 %
HCT VFR BLD AUTO: 42.4 % (ref 42–52)
HCV AB SERPL QL IA: NONREACTIVE
HDLC SERPL-MCNC: 50 MG/DL (ref 35–60)
HGB BLD-MCNC: 14.4 G/DL (ref 14–18)
HIV 1+2 AB+HIV1 P24 AG SERPL QL IA: NONREACTIVE
IMM GRANULOCYTES # BLD AUTO: 0.01 X10(3)/MCL (ref 0–0.04)
IMM GRANULOCYTES NFR BLD AUTO: 0.2 %
LDLC SERPL CALC-MCNC: 96 MG/DL (ref 50–140)
LYMPHOCYTES # BLD AUTO: 2.1 X10(3)/MCL (ref 0.6–4.6)
LYMPHOCYTES NFR BLD AUTO: 42.9 %
MCH RBC QN AUTO: 30.5 PG (ref 27–31)
MCHC RBC AUTO-ENTMCNC: 34 G/DL (ref 33–36)
MCV RBC AUTO: 89.8 FL (ref 80–94)
MONOCYTES # BLD AUTO: 0.35 X10(3)/MCL (ref 0.1–1.3)
MONOCYTES NFR BLD AUTO: 7.2 %
NEUTROPHILS # BLD AUTO: 2.3 X10(3)/MCL (ref 2.1–9.2)
NEUTROPHILS NFR BLD AUTO: 47 %
NRBC BLD AUTO-RTO: 0 %
PLATELET # BLD AUTO: 207 X10(3)/MCL (ref 130–400)
PMV BLD AUTO: 10.1 FL (ref 7.4–10.4)
POTASSIUM SERPL-SCNC: 3.8 MMOL/L (ref 3.5–5.1)
PROT SERPL-MCNC: 7.8 GM/DL (ref 6.4–8.3)
RBC # BLD AUTO: 4.72 X10(6)/MCL (ref 4.7–6.1)
SODIUM SERPL-SCNC: 140 MMOL/L (ref 136–145)
TRIGL SERPL-MCNC: 82 MG/DL (ref 34–140)
VLDLC SERPL CALC-MCNC: 16 MG/DL
WBC # BLD AUTO: 4.89 X10(3)/MCL (ref 4.5–11.5)

## 2025-07-02 PROCEDURE — 86803 HEPATITIS C AB TEST: CPT

## 2025-07-02 PROCEDURE — 80053 COMPREHEN METABOLIC PANEL: CPT

## 2025-07-02 PROCEDURE — 87389 HIV-1 AG W/HIV-1&-2 AB AG IA: CPT

## 2025-07-02 PROCEDURE — 83036 HEMOGLOBIN GLYCOSYLATED A1C: CPT

## 2025-07-02 PROCEDURE — 80061 LIPID PANEL: CPT

## 2025-07-02 PROCEDURE — 36415 COLL VENOUS BLD VENIPUNCTURE: CPT

## 2025-07-02 PROCEDURE — 85025 COMPLETE CBC W/AUTO DIFF WBC: CPT

## 2025-07-02 PROCEDURE — 99213 OFFICE O/P EST LOW 20 MIN: CPT | Mod: PBBFAC

## 2025-07-02 NOTE — PROGRESS NOTES
[facility] INTERNAL MEDICINE  OUTPATIENT OFFICE VISIT NOTE    SUBJECTIVE:      Chief Complaint: Follow-up (Would like a rx for smoking patches )       HPI: Chun Fischer is a 35 y.o. yo male w/ PMH of  has a past medical history of Deaf, epilepsy,  Schizoaffective disorder per chart review, chronic oral pain., who presents for 2 month(s) follow up.    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 States clocks in to work at 7 am but gets home around 12 AM. Sleeps until 6 AM.  Patient states he has psychosocial barriers, works long hours but his family wants him to work more at home even if it encroaches on his sleeping hours    Patient needs new psychiatric provider. Encouraged patient to seek [facility] services     Discussing job difficulties - stating having to work overtime and interpersonal conflict with management. Apparently not wearing cochlear implants as he states the environment is loud and all he hears is a loud beep.  At times he states    Last seizure - In February and had subsequent ED visit.  He reports compliance with his Depakote 1000 mg q.h.s..  Denies missing any doses.  Denies any seizures since February. Has not had any new seizures    Past Medical History:   has a past medical history of Deaf, Hypertension, Migraines, and Seizures.     Past Surgical History:   has a past surgical history that includes Inner ear surgery (Right); Implantation of cochlear prosthesis; and Abdominal surgery.     Family History:  family history includes Hypertension in his mother.     Social History:   reports that he has been smoking cigarettes. He started smoking about 15 years ago. He has a 5 pack-year smoking history. He has never used smokeless tobacco. He reports that he does not currently use alcohol. He reports that he does not use drugs.     Allergies:  has no known allergies.     Home Medications:  Current Outpatient  Medications   Medication Instructions    divalproex ER (DEPAKOTE ER) 1,000 mg, Oral, Nightly    hydrOXYzine HCL (ATARAX) 25 mg, Oral, 3 times daily PRN    hydrOXYzine pamoate (VISTARIL) 25 mg, Oral, Every 6 hours PRN    nicotine (NICODERM CQ) 21 mg/24 hr 1 patch, Transdermal, Daily    traZODone (DESYREL) 50 MG tablet Take 50-100mg (1-2 tablets) by mouth nightly as needed for insomnia.    white petrolatum-mineral oiL (ARTIFICIAL TEARS, ROSALBA/MIN,) 83-15 % Oint Both Eyes, Nightly        ROS:  Review of Systems   Constitutional:  Positive for malaise/fatigue. Negative for chills and fever.          OBJECTIVE:     Vital signs:   There were no vitals taken for this visit.     Physical Examination:  General: Well nourished w/o distress  Neck: Full ROM; no lymphadenopathy  Pulm: CTA bilaterally, normal work of breathing  CV: S1, S2 w/o murmurs or gallops; no edema noted  GI: Soft with normal bowel sounds in all quadrants, no masses on palpation  MSK: Full ROM of all extremities and spine w/o limitation or discomfort.  Left-sided tenderness to palpation at left inferior chest/upper left upper quadrant abdominal area.  Derm: No rashes, abnormal bruising, or skin lesions noted at chest area.  Noted area of darkening skin in left flank consistent with birthmark  Neuro: AAOx4; outside of deafness, CN II-XII intact; motor/sensory function intact      Labs:  CMP:   Recent Labs   Lab 02/03/25  1809 02/08/25  2316 06/04/25  1417   Sodium 140 141 142   Potassium 3.6 3.8 3.5   CO2 27 26 25   Blood Urea Nitrogen 13.1 15.8 8.4 L   Creatinine 0.77 0.69 L 0.83   Calcium 9.1 9.0 8.9   Albumin 3.7 3.8 4.0   Bilirubin Total 0.5 0.3 0.5   AST 21 22 16   ALT 16 16 10   ALP 47 52 58     CBC:   Recent Labs   Lab 02/03/25  1808 02/08/25  2316 06/04/25  1417   WBC 5.21 6.29 4.04 L   Neut # 2.45 3.55 1.66 L   RBC 4.07 L 4.28 L 4.34 L   Hgb 13.0 L 13.5 L 13.4 L   Hct 37.3 L 40.0 L 39.1 L   Platelet 195 213 184   MCV 91.6 93.5 90.1   RDW 12.8 13.2  12.2     FLP:         DM:   Recent Labs   Lab 02/03/25  1809 02/08/25  2316 06/04/25  1417   Creatinine 0.77 0.69 L 0.83     Thyroid:   Recent Labs   Lab 09/20/24  0934 06/04/25  1417 06/05/25  1258   TSH 0.741 0.560 0.534     Anemia:   Recent Labs   Lab 06/04/25  1417   Hgb 13.4 L   Hct 39.1 L           ASSESSMENT & PLAN:      Schizoaffective disorder   - remeron was stopped by prior psychiatric provider  - will need to f/u with psychiatrist at [facility] as previous provider no longer practicing in the area    Epilepsy  - EEG 1/31/24 - abnormal routine awake and sleep EEG suggestive of 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   - s/p tooth extraction w/ [facility]istry   - medical release form signed to obtain records  - dentures to be done in upcoming months  - ibuprofen p.r.n.    Tobacco use disorder  - 1 pack/day since 20 y/o (8 pack year)  - encouraged reducing tobacco use - has cut down to 2 cigarettes per day  - taking Rx nicotine patches and gum as prescribed    Snoring  Fatigue  - referred to sleep medicine previously  - done in Jan 2025 - no reported ALEA  - would benefit from mouth guard       Health Maintenance  Vaccinations  Immunization History   Administered Date(s) Administered    DTP 1989, 1989, 1989, 11/29/1990    HIB 11/01/1990    Hepatitis A, Adult 03/28/2008, 09/30/2008    Influenza - Quadrivalent - PF *Preferred* (6 months and older) 01/17/2024    Influenza - Trivalent - Fluarix, Flulaval, Fluzone, Afluria - PF 12/10/2020, 09/26/2024    MMR 08/10/1990    OPV 1989, 1989, 11/01/1990    Pneumococcal Conjugate - 13 Valent 07/13/2016    Td (ADULT) 01/17/2014    Td (Adult), Unspecified Formulation  02/28/2000    Td - PF (ADULT) 04/28/2015    Tdap 01/12/2014, 11/13/2014, 09/05/2016, 02/24/2017, 07/17/2021, 12/30/2021, 02/01/2022       -Flu: Done 1/71/24    -Pneumonia: will address at next visit    Screening   -Lung Ca. Screening:  Not of age   -Colon Ca. Screening:  Not of age   -Hep C, HIV:  Ordered 05/08/2024-pending  Social   -EtOH:  reports that he does not currently use alcohol.   -Tobacco:  reports that he has been smoking cigarettes. He started smoking about 15 years ago. He has a 5 pack-year smoking history. He has never used smokeless tobacco.   -Drugs:  reports no history of drug use.    -Depression:   Depression: Low Risk  (1/7/2025)    Depression     Last PHQ-4: Flowsheet Data: 2          Return to clinic in 5 month(s).    Main Eisenberg DO  [employer], Internal Medicine, PGY-II

## 2025-07-03 RX ORDER — TRAZODONE HYDROCHLORIDE 50 MG/1
TABLET ORAL
Qty: 60 TABLET | Refills: 11 | Status: SHIPPED | OUTPATIENT
Start: 2025-07-03

## 2025-07-28 NOTE — PROGRESS NOTES
[facility] Neurology Follow Up Office Visit Note    Initial Visit Date: 8/27/2024  Last Visit Date: 1/7/2025  Current Visit Date:  07/30/2025    Chief Complaint:     Chief Complaint   Patient presents with    Localization-related (focal) (partial) idiopathic epilepsy      Pt reports feeling tired. Needs a refill of his Nicotine patches.        History of Present Illness:      Sign language interpretor on site.     This is 35 y.o. right hand dominant male with history of bipolar disorder, ALEA who is referred for right temporal focal epilepsy. During last visit, depakote was increased to 1000 mg at bedtime.     Age of Onset : Childhood     Semiology: behavioral arrest followed by GTC of unknown duration with post ictal confusion.     Frequency: last event 10/27/2024    Provocation Factors: overheating, stress. Poor compliance.     Risk Factors  - Family history of seizure disorders:  No  - History of focal CNS lesions: No  - History of CNS infections: No  - History head trauma with prolonged LOC: Unknown  - History of childhood seizures, including febrile seizures: Yes as above   - History of development delay: Unknown   - History of underlying mood disorder: Yes bipolar disorder   - History of sleep disorder: Yes ALEA  - Recreational drug use: No  - Alcohol use: No  - Family planning and contraceptive use: Not Applicable     Medications:     Current Anti-Seizure Medication(s):  Deapkote 1000 mg at bedtime: compliant.     Prior Anti-Seizure Medication(s):  Depakote 1000 mg at bedtime: not taking.    Surgical Intervention & Devices:     - VNS:  - DBS  - RNS:  - Lobectomy:    Labs:     Results for orders placed or performed in visit on 07/02/25   Comprehensive Metabolic Panel    Collection Time: 07/02/25 10:13 AM   Result Value Ref Range    Sodium 140 136 - 145 mmol/L    Potassium 3.8 3.5 - 5.1 mmol/L    Chloride 107 98 - 107 mmol/L    CO2 27 22 - 29 mmol/L    Glucose 85 74 - 100 mg/dL    Blood Urea Nitrogen 13.8 8.9 - 20.6  mg/dL    Creatinine 0.77 0.72 - 1.25 mg/dL    Calcium 9.3 8.4 - 10.2 mg/dL    Protein Total 7.8 6.4 - 8.3 gm/dL    Albumin 4.5 3.5 - 5.0 g/dL    Globulin 3.3 2.4 - 3.5 gm/dL    Albumin/Globulin Ratio 1.4 1.1 - 2.0 ratio    Bilirubin Total 0.6 <=1.5 mg/dL    ALP 64 40 - 150 unit/L    ALT 14 0 - 55 unit/L    AST 22 11 - 45 unit/L    eGFR >60 mL/min/1.73/m2    Anion Gap 6.0 mEq/L    BUN/Creatinine Ratio 18    Hemoglobin A1C    Collection Time: 07/02/25 10:13 AM   Result Value Ref Range    Hemoglobin A1c 5.3 <=7.0 %    Estimated Average Glucose 105.4 mg/dL   HIV 1/2 Ag/Ab (4th Gen)    Collection Time: 07/02/25 10:13 AM   Result Value Ref Range    HIV Nonreactive Nonreactive   Hepatitis C Antibody    Collection Time: 07/02/25 10:13 AM   Result Value Ref Range    Hep C Ab Interp Nonreactive Nonreactive   Lipid Panel    Collection Time: 07/02/25 10:13 AM   Result Value Ref Range    Cholesterol Total 162 <=200 mg/dL    HDL Cholesterol 50 35 - 60 mg/dL    Triglyceride 82 34 - 140 mg/dL    Cholesterol/HDL Ratio 3 0 - 5    Very Low Density Lipoprotein 16     LDL Cholesterol 96.00 50.00 - 140.00 mg/dL   CBC with Differential    Collection Time: 07/02/25 10:13 AM   Result Value Ref Range    WBC 4.89 4.50 - 11.50 x10(3)/mcL    RBC 4.72 4.70 - 6.10 x10(6)/mcL    Hgb 14.4 14.0 - 18.0 g/dL    Hct 42.4 42.0 - 52.0 %    MCV 89.8 80.0 - 94.0 fL    MCH 30.5 27.0 - 31.0 pg    MCHC 34.0 33.0 - 36.0 g/dL    RDW 12.1 11.5 - 17.0 %    Platelet 207 130 - 400 x10(3)/mcL    MPV 10.1 7.4 - 10.4 fL    Neut % 47.0 %    Lymph % 42.9 %    Mono % 7.2 %    Eos % 2.5 %    Basophil % 0.2 %    Imm Grans % 0.2 %    Neut # 2.30 2.1 - 9.2 x10(3)/mcL    Lymph # 2.10 0.6 - 4.6 x10(3)/mcL    Mono # 0.35 0.1 - 1.3 x10(3)/mcL    Eos # 0.12 0 - 0.9 x10(3)/mcL    Baso # 0.01 <=0.2 x10(3)/mcL    Imm Gran # 0.01 0.00 - 0.04 x10(3)/mcL    NRBC% 0.0 %      Latest Reference Range & Units Most Recent   Valproic Acid Lvl 50.0 - 100.0 ug/ml <12.5 (L)  1/7/25 13:49   (L):  Data is abnormally low  Studies:      - routine EEG 1/31/2024: This is an abnormal routine awake and asleep EEG with right posterior temporal epileptiform discharge.   - one hour EEG:   - 24 hour EEG:  - Ambulatory EEG:  - EMU Video EEG:  - MRI Brain:   - NCHCT:  - WADA:    Review of Systems:     Review of Systems   All other systems reviewed and are negative.      Physical Exams:     Vitals:    07/30/25 0944   BP: (!) 133/92   Pulse: 75   Resp: 16   Temp: 97.6 °F (36.4 °C)           Physical Exam  Vitals and nursing note reviewed.   Constitutional:       Appearance: Normal appearance.   HENT:      Head: Normocephalic and atraumatic.      Nose: Nose normal.      Mouth/Throat:      Mouth: Mucous membranes are moist.      Pharynx: Oropharynx is clear.   Eyes:      Conjunctiva/sclera: Conjunctivae normal.   Cardiovascular:      Rate and Rhythm: Normal rate and regular rhythm.      Pulses: Normal pulses.      Heart sounds: Normal heart sounds.   Pulmonary:      Effort: Pulmonary effort is normal.      Breath sounds: Normal breath sounds.   Abdominal:      General: Abdomen is flat.      Palpations: Abdomen is soft.   Musculoskeletal:         General: Normal range of motion.      Cervical back: Normal range of motion.   Neurological:      Mental Status: He is alert.   Psychiatric:         Mood and Affect: Mood normal.         Comprehensive Neurological Exam:  Mental Status: Alert Oriented to Self, Date, and Place.  Comprehension wnl.   CN II - XII: NICOLE, No APD, VFFC, No ptosis OU, EOMI without nystagmus, LT/Temp symmetric in CN V1-3 distribution, Hearing impaired, Face Symmetric, Tongue and Uvula midline, Trapezius symmetric bilateral.   Motor: tone and bulk wnl throughout, no abnormal involuntary or voluntary movements, 5/5 to confrontation, Fine finger movements wnl b/l, No pronator drift.   Sensory: LT, Proprioception, Vibration, PP, Temp symmetric.   Reflexes: 2+ throughout  Cerebellar: FNF wnl b/l, RAHM wnl  b/l  Gait: normal.     Assessment:     This is 35 y.o. right hand dominant male with history of bipolar disorder, ALEA who is referred for right temporal focal epilepsy, non compliant.    1. Localization-related (focal) (partial) idiopathic epilepsy and epileptic syndromes with seizures of localized onset, not intractable, without status epilepticus        Plan:     [] continue with Depakote 1000 mg at bedtime     RTC 6 Months      Visit today is associated with current or anticipated ongoing medical care related to this patient's single serious condition/complex condition as documented above.     Seizure education provided: including family planning and teratogenicity of AEDs, risk of uncontrolled seizure disorder, SUDEP. No driving until seizure free for six months ([place] state law). No swimming, climbing heights, or operate heavy machinery without supervision. Patient is advised to avoid Benadryl, Tramadol, Wellbutrin, flashing lights, alcohol, sleep deprivation, and certain anti-biotics that can lower seizure threshold.     I have explained the treatment plan, diagnosis, and prognosis to patient. All questions are answered to the best of my knowledge.     Face to face time 30 minutes, including documentation, chart review, counseling, education, review of test results, relevant medical records, and coordination of care.       Carmen Wynne MD   General Neurology  07/30/2025

## 2025-07-30 ENCOUNTER — OFFICE VISIT (OUTPATIENT)
Dept: NEUROLOGY | Facility: CLINIC | Age: 35
End: 2025-07-30
Payer: COMMERCIAL

## 2025-07-30 VITALS
OXYGEN SATURATION: 99 % | TEMPERATURE: 98 F | HEIGHT: 69 IN | HEART RATE: 75 BPM | WEIGHT: 175.19 LBS | RESPIRATION RATE: 16 BRPM | DIASTOLIC BLOOD PRESSURE: 80 MMHG | BODY MASS INDEX: 25.95 KG/M2 | SYSTOLIC BLOOD PRESSURE: 120 MMHG

## 2025-07-30 DIAGNOSIS — F17.200 TOBACCO USE DISORDER: ICD-10-CM

## 2025-07-30 DIAGNOSIS — G40.009 LOCALIZATION-RELATED (FOCAL) (PARTIAL) IDIOPATHIC EPILEPSY AND EPILEPTIC SYNDROMES WITH SEIZURES OF LOCALIZED ONSET, NOT INTRACTABLE, WITHOUT STATUS EPILEPTICUS: Primary | ICD-10-CM

## 2025-07-30 PROCEDURE — 99214 OFFICE O/P EST MOD 30 MIN: CPT | Mod: S$PBB,,, | Performed by: PSYCHIATRY & NEUROLOGY

## 2025-07-30 PROCEDURE — 3044F HG A1C LEVEL LT 7.0%: CPT | Mod: CPTII,,, | Performed by: PSYCHIATRY & NEUROLOGY

## 2025-07-30 PROCEDURE — 1159F MED LIST DOCD IN RCRD: CPT | Mod: CPTII,,, | Performed by: PSYCHIATRY & NEUROLOGY

## 2025-07-30 PROCEDURE — 99213 OFFICE O/P EST LOW 20 MIN: CPT | Mod: PBBFAC | Performed by: PSYCHIATRY & NEUROLOGY

## 2025-07-30 PROCEDURE — 3075F SYST BP GE 130 - 139MM HG: CPT | Mod: CPTII,,, | Performed by: PSYCHIATRY & NEUROLOGY

## 2025-07-30 PROCEDURE — G2211 COMPLEX E/M VISIT ADD ON: HCPCS | Mod: ,,, | Performed by: PSYCHIATRY & NEUROLOGY

## 2025-07-30 PROCEDURE — 3080F DIAST BP >= 90 MM HG: CPT | Mod: CPTII,,, | Performed by: PSYCHIATRY & NEUROLOGY

## 2025-07-30 PROCEDURE — 3008F BODY MASS INDEX DOCD: CPT | Mod: CPTII,,, | Performed by: PSYCHIATRY & NEUROLOGY

## 2025-07-30 RX ORDER — IBUPROFEN 200 MG
1 TABLET ORAL DAILY
Qty: 30 PATCH | Refills: 0 | Status: SHIPPED | OUTPATIENT
Start: 2025-07-30

## 2025-07-30 RX ORDER — DIVALPROEX SODIUM 500 MG/1
1000 TABLET, FILM COATED, EXTENDED RELEASE ORAL NIGHTLY
Qty: 180 TABLET | Refills: 1 | Status: SHIPPED | OUTPATIENT
Start: 2025-07-30 | End: 2026-01-26